# Patient Record
Sex: FEMALE | Race: WHITE | NOT HISPANIC OR LATINO | Employment: OTHER | URBAN - METROPOLITAN AREA
[De-identification: names, ages, dates, MRNs, and addresses within clinical notes are randomized per-mention and may not be internally consistent; named-entity substitution may affect disease eponyms.]

---

## 2017-02-22 RX ORDER — TRIAMTERENE AND HYDROCHLOROTHIAZIDE 37.5; 25 MG/1; MG/1
CAPSULE ORAL
Qty: 90 CAPSULE | Refills: 0 | Status: SHIPPED | OUTPATIENT
Start: 2017-02-22 | End: 2017-05-22 | Stop reason: SDUPTHER

## 2017-03-13 RX ORDER — METOPROLOL SUCCINATE 50 MG/1
TABLET, EXTENDED RELEASE ORAL
Qty: 90 TABLET | Refills: 0 | Status: SHIPPED | OUTPATIENT
Start: 2017-03-13 | End: 2017-06-11 | Stop reason: SDUPTHER

## 2017-03-27 RX ORDER — EZETIMIBE 10 MG/1
TABLET ORAL
Qty: 90 TABLET | OUTPATIENT
Start: 2017-03-27

## 2017-05-22 RX ORDER — TRIAMTERENE AND HYDROCHLOROTHIAZIDE 37.5; 25 MG/1; MG/1
CAPSULE ORAL
Qty: 90 CAPSULE | Refills: 0 | Status: SHIPPED | OUTPATIENT
Start: 2017-05-22 | End: 2017-07-01 | Stop reason: SDUPTHER

## 2017-06-12 RX ORDER — METOPROLOL SUCCINATE 50 MG/1
TABLET, EXTENDED RELEASE ORAL
Qty: 30 TABLET | Refills: 0 | Status: SHIPPED | OUTPATIENT
Start: 2017-06-12 | End: 2017-07-01 | Stop reason: SDUPTHER

## 2017-06-22 RX ORDER — METOPROLOL SUCCINATE 50 MG/1
TABLET, EXTENDED RELEASE ORAL
Qty: 7 TABLET | Refills: 0 | OUTPATIENT
Start: 2017-06-22

## 2017-06-22 RX ORDER — METOPROLOL SUCCINATE 25 MG/1
25 TABLET, EXTENDED RELEASE ORAL DAILY
Qty: 7 TABLET | Refills: 0 | Status: SHIPPED | OUTPATIENT
Start: 2017-06-22 | End: 2017-07-01 | Stop reason: SDUPTHER

## 2017-07-01 ENCOUNTER — OFFICE VISIT (OUTPATIENT)
Dept: FAMILY MEDICINE CLINIC | Facility: CLINIC | Age: 82
End: 2017-07-01

## 2017-07-01 VITALS
DIASTOLIC BLOOD PRESSURE: 62 MMHG | SYSTOLIC BLOOD PRESSURE: 96 MMHG | BODY MASS INDEX: 25.16 KG/M2 | HEIGHT: 63 IN | TEMPERATURE: 98.6 F | WEIGHT: 142 LBS

## 2017-07-01 DIAGNOSIS — R94.6 ABNORMAL RESULTS OF THYROID FUNCTION STUDIES: ICD-10-CM

## 2017-07-01 DIAGNOSIS — R07.9 CHEST PAIN, UNSPECIFIED TYPE: ICD-10-CM

## 2017-07-01 DIAGNOSIS — R79.89 OTHER SPECIFIED ABNORMAL FINDINGS OF BLOOD CHEMISTRY: ICD-10-CM

## 2017-07-01 DIAGNOSIS — N39.0 URINARY TRACT INFECTION WITHOUT HEMATURIA, SITE UNSPECIFIED: Primary | ICD-10-CM

## 2017-07-01 LAB
BILIRUB BLD-MCNC: NEGATIVE MG/DL
CLARITY, POC: CLEAR
COLOR UR: YELLOW
GLUCOSE UR STRIP-MCNC: NEGATIVE MG/DL
KETONES UR QL: NEGATIVE
LEUKOCYTE EST, POC: NORMAL
NITRITE UR-MCNC: NEGATIVE MG/ML
PH UR: 6 [PH] (ref 5–8)
PROT UR STRIP-MCNC: NEGATIVE MG/DL
RBC # UR STRIP: NEGATIVE /UL
SP GR UR: 1.02 (ref 1–1.03)
UROBILINOGEN UR QL: NORMAL

## 2017-07-01 PROCEDURE — 93000 ELECTROCARDIOGRAM COMPLETE: CPT | Performed by: FAMILY MEDICINE

## 2017-07-01 PROCEDURE — 81002 URINALYSIS NONAUTO W/O SCOPE: CPT | Performed by: FAMILY MEDICINE

## 2017-07-01 PROCEDURE — 99213 OFFICE O/P EST LOW 20 MIN: CPT | Performed by: FAMILY MEDICINE

## 2017-07-01 RX ORDER — METOPROLOL SUCCINATE 50 MG/1
50 TABLET, EXTENDED RELEASE ORAL DAILY
Qty: 90 TABLET | Refills: 1 | Status: SHIPPED | OUTPATIENT
Start: 2017-07-01 | End: 2018-02-08 | Stop reason: ALTCHOICE

## 2017-07-01 RX ORDER — TRIAMTERENE AND HYDROCHLOROTHIAZIDE 37.5; 25 MG/1; MG/1
1 CAPSULE ORAL EVERY MORNING
Qty: 90 CAPSULE | Refills: 1 | Status: SHIPPED | OUTPATIENT
Start: 2017-07-01 | End: 2017-10-28 | Stop reason: SINTOL

## 2017-07-01 RX ORDER — NITROGLYCERIN 0.4 MG/1
0.4 TABLET SUBLINGUAL
Qty: 25 TABLET | Refills: 2 | Status: SHIPPED | OUTPATIENT
Start: 2017-07-01 | End: 2018-03-30 | Stop reason: HOSPADM

## 2017-07-01 RX ORDER — EZETIMIBE 10 MG/1
10 TABLET ORAL DAILY
Qty: 90 TABLET | Refills: 1 | Status: SHIPPED | OUTPATIENT
Start: 2017-07-01 | End: 2018-02-08

## 2017-07-01 NOTE — PROGRESS NOTES
"  Chief Complaint   Patient presents with   • Hypertension     follow up   • Arthritis     follow up        Subjective.../HPI  Patient present today with abd muscle spasms x 3 last week. Lasts 3-4 min. Had been lifting heavy things before they occurred. Tender in center of abd also. Denies chest pain but had pain from epigastrum going up behind sternum.    I have reviewed the patient's medical history in detail and updated the computerized patient record.    Family History   Problem Relation Age of Onset   • Cancer Mother    • Colon cancer Mother    • Diabetes Sister    • Cancer Brother    • Diabetes Brother        Social History     Social History   • Marital status:      Spouse name: N/A   • Number of children: N/A   • Years of education: N/A     Occupational History   • Not on file.     Social History Main Topics   • Smoking status: Never Smoker   • Smokeless tobacco: Never Used   • Alcohol use No   • Drug use: No   • Sexual activity: Defer     Other Topics Concern   • Not on file     Social History Narrative       Review of Systems:   Review of Systems   Constitutional: Negative.    HENT: Negative.    Eyes: Negative.    Respiratory: Negative.    Cardiovascular: Negative.    Gastrointestinal: Negative.    Endocrine: Negative.    Genitourinary: Negative.    Musculoskeletal: Negative.    Skin: Negative.    Allergic/Immunologic: Negative.    Neurological: Negative.    Hematological: Negative.    Psychiatric/Behavioral: Negative.        Objective:  Vital Signs     Vitals:    07/01/17 1010   BP: 96/62   BP Location: Right arm   Patient Position: Sitting   Cuff Size: Adult   Temp: 98.6 °F (37 °C)   TempSrc: Oral   Weight: 142 lb (64.4 kg)   Height: 63\" (160 cm)     Physical Exam   Constitutional: She is oriented to person, place, and time. She appears well-developed and well-nourished.   HENT:   Head: Normocephalic.   Eyes: Pupils are equal, round, and reactive to light.   Neck: Normal range of motion. "   Cardiovascular: Normal rate, regular rhythm and normal heart sounds.    Pulmonary/Chest: Effort normal.   Abdominal: Soft.   Musculoskeletal: Normal range of motion.   Neurological: She is alert and oriented to person, place, and time.   Skin: Skin is warm and dry.        Results Review:      REVIEWED AND DISCUSSED CLINICAL RESULTS WITH PATIENT and REVIEWED AND DISCCUSSED POCT RESULTS WITH PATIENT                          Current Outpatient Prescriptions:   •  ezetimibe (ZETIA) 10 MG tablet, Take 1 tablet by mouth Daily., Disp: 90 tablet, Rfl: 1  •  metoprolol succinate XL (TOPROL-XL) 50 MG 24 hr tablet, Take 1 tablet by mouth Daily., Disp: 90 tablet, Rfl: 1  •  triamterene-hydrochlorothiazide (DYAZIDE) 37.5-25 MG per capsule, Take 1 capsule by mouth Every Morning., Disp: 90 capsule, Rfl: 1  •  nitroglycerin (NITROSTAT) 0.4 MG SL tablet, Place 1 tablet under the tongue Every 5 (Five) Minutes As Needed for Chest Pain. Take no more than 3 doses in 15 minutes., Disp: 25 tablet, Rfl: 2      ECG 12 Lead  Date/Time: 7/1/2017 10:54 AM  Performed by: DIANE RUBIO JR.  Authorized by: DIANE RUBIO JR.   Comparison: compared with previous ECG from 4/2/2016  Comparison to previous ECG: This ekg has improved  Rhythm: sinus rhythm  Rate: normal  T depression: III and V1  QRS axis: normal  Clinical impression: non-specific ECG            Assessment/Plan     Diagnoses and all orders for this visit:    Urinary tract infection without hematuria, site unspecified  -     POCT urinalysis dipstick, manual  -     Urine Culture  -     CBC & Differential  -     Comprehensive Metabolic Panel  -     Hemoglobin A1c  -     Lipid Panel With LDL / HDL Ratio  -     Thyroid Panel With TSH  -     D-dimer, Quantitative  -     CK; Future  -     CK MB; Future  -     Troponin T; Future    Chest pain, unspecified type  -     Urine Culture  -     CBC & Differential  -     Comprehensive Metabolic Panel  -     Hemoglobin A1c  -     Lipid  Panel With LDL / HDL Ratio  -     Thyroid Panel With TSH  -     D-dimer, Quantitative  -     CK; Future  -     CK MB; Future  -     Troponin T; Future  -     Stress Test With Myocardial Perfusion One Day    Other specified abnormal findings of blood chemistry   -     Hemoglobin A1c    Abnormal results of thyroid function studies   -     Thyroid Panel With TSH    Other orders  -     ECG 12 Lead  -     nitroglycerin (NITROSTAT) 0.4 MG SL tablet; Place 1 tablet under the tongue Every 5 (Five) Minutes As Needed for Chest Pain. Take no more than 3 doses in 15 minutes.  -     ezetimibe (ZETIA) 10 MG tablet; Take 1 tablet by mouth Daily.  -     metoprolol succinate XL (TOPROL-XL) 50 MG 24 hr tablet; Take 1 tablet by mouth Daily.  -     triamterene-hydrochlorothiazide (DYAZIDE) 37.5-25 MG per capsule; Take 1 capsule by mouth Every Morning.         Pt did not want to go to hospital and daughter is present  Daniel Cano Jr., MD  07/01/17  12:17 PM

## 2017-07-03 LAB
ALBUMIN SERPL-MCNC: 4.7 G/DL (ref 3.5–4.7)
ALBUMIN/GLOB SERPL: 1.7 {RATIO} (ref 1.2–2.2)
ALP SERPL-CCNC: 58 IU/L (ref 39–117)
ALT SERPL-CCNC: 13 IU/L (ref 0–32)
AST SERPL-CCNC: 22 IU/L (ref 0–40)
BACTERIA UR CULT: NORMAL
BACTERIA UR CULT: NORMAL
BASOPHILS # BLD AUTO: 0 X10E3/UL (ref 0–0.2)
BASOPHILS NFR BLD AUTO: 0 %
BILIRUB SERPL-MCNC: 0.6 MG/DL (ref 0–1.2)
BUN SERPL-MCNC: 44 MG/DL (ref 8–27)
BUN/CREAT SERPL: 31 (ref 12–28)
CALCIUM SERPL-MCNC: 9.7 MG/DL (ref 8.7–10.3)
CHLORIDE SERPL-SCNC: 99 MMOL/L (ref 96–106)
CHOLEST SERPL-MCNC: 188 MG/DL (ref 100–199)
CO2 SERPL-SCNC: 25 MMOL/L (ref 18–29)
CREAT SERPL-MCNC: 1.42 MG/DL (ref 0.57–1)
D DIMER PPP FEU-MCNC: 2.19 MG/L FEU (ref 0–0.49)
EOSINOPHIL # BLD AUTO: 0.2 X10E3/UL (ref 0–0.4)
EOSINOPHIL NFR BLD AUTO: 3 %
ERYTHROCYTE [DISTWIDTH] IN BLOOD BY AUTOMATED COUNT: 14.6 % (ref 12.3–15.4)
FT4I SERPL CALC-MCNC: 2.8 (ref 1.2–4.9)
GLOBULIN SER CALC-MCNC: 2.7 G/DL (ref 1.5–4.5)
GLUCOSE SERPL-MCNC: 106 MG/DL (ref 65–99)
HBA1C MFR BLD: 6.5 % (ref 4.8–5.6)
HCT VFR BLD AUTO: 41 % (ref 34–46.6)
HDLC SERPL-MCNC: 48 MG/DL
HGB BLD-MCNC: 13.2 G/DL (ref 11.1–15.9)
IMM GRANULOCYTES # BLD: 0 X10E3/UL (ref 0–0.1)
IMM GRANULOCYTES NFR BLD: 0 %
LDLC SERPL CALC-MCNC: 104 MG/DL (ref 0–99)
LDLC/HDLC SERPL: 2.2 RATIO UNITS (ref 0–3.2)
LYMPHOCYTES # BLD AUTO: 2.7 X10E3/UL (ref 0.7–3.1)
LYMPHOCYTES NFR BLD AUTO: 33 %
MCH RBC QN AUTO: 29.5 PG (ref 26.6–33)
MCHC RBC AUTO-ENTMCNC: 32.2 G/DL (ref 31.5–35.7)
MCV RBC AUTO: 92 FL (ref 79–97)
MONOCYTES # BLD AUTO: 0.6 X10E3/UL (ref 0.1–0.9)
MONOCYTES NFR BLD AUTO: 7 %
NEUTROPHILS # BLD AUTO: 4.6 X10E3/UL (ref 1.4–7)
NEUTROPHILS NFR BLD AUTO: 57 %
PLATELET # BLD AUTO: 170 X10E3/UL (ref 150–379)
POTASSIUM SERPL-SCNC: 4.6 MMOL/L (ref 3.5–5.2)
PROT SERPL-MCNC: 7.4 G/DL (ref 6–8.5)
RBC # BLD AUTO: 4.48 X10E6/UL (ref 3.77–5.28)
SODIUM SERPL-SCNC: 143 MMOL/L (ref 134–144)
T3RU NFR SERPL: 32 % (ref 24–39)
T4 SERPL-MCNC: 8.7 UG/DL (ref 4.5–12)
TRIGL SERPL-MCNC: 182 MG/DL (ref 0–149)
TSH SERPL DL<=0.005 MIU/L-ACNC: 2.37 UIU/ML (ref 0.45–4.5)
VLDLC SERPL CALC-MCNC: 36 MG/DL (ref 5–40)
WBC # BLD AUTO: 8.2 X10E3/UL (ref 3.4–10.8)

## 2017-07-05 ENCOUNTER — HOSPITAL ENCOUNTER (OUTPATIENT)
Dept: CARDIOLOGY | Facility: HOSPITAL | Age: 82
Discharge: HOME | End: 2017-07-05
Attending: FAMILY MEDICINE

## 2017-07-05 VITALS — SYSTOLIC BLOOD PRESSURE: 118 MMHG | HEART RATE: 71 BPM | DIASTOLIC BLOOD PRESSURE: 66 MMHG

## 2017-07-05 LAB
BH CV STRESS BP STAGE 1: NORMAL
BH CV STRESS COMMENTS STAGE 1: NORMAL
BH CV STRESS DOSE REGADENOSON STAGE 1: 0.4
BH CV STRESS DURATION MIN STAGE 1: 0
BH CV STRESS DURATION SEC STAGE 1: 15
BH CV STRESS HR STAGE 1: 91
BH CV STRESS PROTOCOL 1: NORMAL
BH CV STRESS RECOVERY BP: NORMAL MMHG
BH CV STRESS RECOVERY HR: 93 BPM
BH CV STRESS STAGE 1: 1
LV EF NUC BP: 84 %
MAXIMAL PREDICTED HEART RATE: 138 BPM
PERCENT MAX PREDICTED HR: 72.46 %
STRESS BASELINE BP: NORMAL MMHG
STRESS BASELINE HR: 69 BPM
STRESS PERCENT HR: 85 %
STRESS POST ESTIMATED WORKLOAD: 1.7 METS
STRESS POST EXERCISE DUR MIN: 1 MIN
STRESS POST EXERCISE DUR SEC: 0 SEC
STRESS POST PEAK BP: NORMAL MMHG
STRESS POST PEAK HR: 100 BPM
STRESS TARGET HR: 117 BPM

## 2017-07-05 PROCEDURE — 25010000002 REGADENOSON 0.4 MG/5ML SOLUTION: Performed by: FAMILY MEDICINE

## 2017-07-05 PROCEDURE — 78452 HT MUSCLE IMAGE SPECT MULT: CPT | Performed by: INTERNAL MEDICINE

## 2017-07-05 PROCEDURE — 93017 CV STRESS TEST TRACING ONLY: CPT

## 2017-07-05 PROCEDURE — 93018 CV STRESS TEST I&R ONLY: CPT | Performed by: INTERNAL MEDICINE

## 2017-07-05 PROCEDURE — 0 TECHNETIUM SESTAMIBI: Performed by: FAMILY MEDICINE

## 2017-07-05 PROCEDURE — 93016 CV STRESS TEST SUPVJ ONLY: CPT | Performed by: INTERNAL MEDICINE

## 2017-07-05 PROCEDURE — 78452 HT MUSCLE IMAGE SPECT MULT: CPT

## 2017-07-05 PROCEDURE — A9500 TC99M SESTAMIBI: HCPCS | Performed by: FAMILY MEDICINE

## 2017-07-05 RX ADMIN — REGADENOSON 0.4 MG: 0.08 INJECTION, SOLUTION INTRAVENOUS at 11:26

## 2017-07-05 RX ADMIN — Medication 1 DOSE: at 11:26

## 2017-07-05 RX ADMIN — Medication 1 DOSE: at 08:36

## 2017-07-06 ENCOUNTER — RESULTS ENCOUNTER (OUTPATIENT)
Dept: FAMILY MEDICINE CLINIC | Facility: CLINIC | Age: 82
End: 2017-07-06

## 2017-07-06 DIAGNOSIS — N39.0 URINARY TRACT INFECTION WITHOUT HEMATURIA, SITE UNSPECIFIED: ICD-10-CM

## 2017-07-06 DIAGNOSIS — R07.9 CHEST PAIN, UNSPECIFIED TYPE: ICD-10-CM

## 2017-08-07 ENCOUNTER — TELEPHONE (OUTPATIENT)
Dept: FAMILY MEDICINE CLINIC | Facility: CLINIC | Age: 82
End: 2017-08-07

## 2017-08-07 NOTE — TELEPHONE ENCOUNTER
----- Message from Olivia Holm sent at 8/7/2017  3:43 PM EDT -----  Regarding: Results  Contact: [phone]  On 7/3/17 patient had blood work done and would like a call about the results.    Informed patient to give 48 hrs for call back.    Thank you    Last office visit 7/1/17

## 2017-08-08 NOTE — TELEPHONE ENCOUNTER
Told patient results of all her labs.  She could not hear so discussed this with her son.  She will make an appointment to come in so we can discuss her renal function tests.

## 2017-10-28 ENCOUNTER — OFFICE VISIT (OUTPATIENT)
Dept: FAMILY MEDICINE CLINIC | Facility: CLINIC | Age: 82
End: 2017-10-28

## 2017-10-28 VITALS
SYSTOLIC BLOOD PRESSURE: 100 MMHG | HEART RATE: 64 BPM | OXYGEN SATURATION: 93 % | TEMPERATURE: 98.2 F | WEIGHT: 143 LBS | HEIGHT: 63 IN | DIASTOLIC BLOOD PRESSURE: 56 MMHG | BODY MASS INDEX: 25.34 KG/M2

## 2017-10-28 DIAGNOSIS — N18.30 CKD (CHRONIC KIDNEY DISEASE) STAGE 3, GFR 30-59 ML/MIN (HCC): Primary | ICD-10-CM

## 2017-10-28 DIAGNOSIS — N30.00 ACUTE CYSTITIS WITHOUT HEMATURIA: ICD-10-CM

## 2017-10-28 DIAGNOSIS — I10 ESSENTIAL HYPERTENSION: ICD-10-CM

## 2017-10-28 LAB
BILIRUB BLD-MCNC: NEGATIVE MG/DL
CLARITY, POC: CLEAR
COLOR UR: YELLOW
GLUCOSE UR STRIP-MCNC: NEGATIVE MG/DL
KETONES UR QL: NEGATIVE
LEUKOCYTE EST, POC: NEGATIVE
NITRITE UR-MCNC: NEGATIVE MG/ML
PH UR: 6 [PH] (ref 5–8)
PROT UR STRIP-MCNC: NEGATIVE MG/DL
RBC # UR STRIP: NEGATIVE /UL
SP GR UR: 1.02 (ref 1–1.03)
UROBILINOGEN UR QL: NORMAL

## 2017-10-28 PROCEDURE — 99213 OFFICE O/P EST LOW 20 MIN: CPT | Performed by: FAMILY MEDICINE

## 2017-10-28 PROCEDURE — 81003 URINALYSIS AUTO W/O SCOPE: CPT | Performed by: FAMILY MEDICINE

## 2017-10-28 NOTE — PROGRESS NOTES
Chief Complaint   Patient presents with   • Urinary Tract Infection     follow up from last visit do not have a problem today with a uti but following up from last visit        Subjective.../HPI  Patient present today with f/u nocturia but no h/d. Slight dizzy in am after taking her meds. Pt only drinks one glass of water daily    I have reviewed the patient's medical history in detail and updated the computerized patient record.    Family History   Problem Relation Age of Onset   • Cancer Mother    • Colon cancer Mother    • Diabetes Sister    • Cancer Brother    • Diabetes Brother        Social History     Social History   • Marital status:      Spouse name: N/A   • Number of children: N/A   • Years of education: N/A     Occupational History   • Not on file.     Social History Main Topics   • Smoking status: Never Smoker   • Smokeless tobacco: Never Used   • Alcohol use No   • Drug use: No   • Sexual activity: Defer     Other Topics Concern   • Not on file     Social History Narrative       Review of Systems:   Review of Systems   Constitutional: Negative for chills, fatigue, fever and unexpected weight change.   HENT: Negative for ear pain, hearing loss, sinus pressure, sore throat and tinnitus.    Eyes: Negative for pain, discharge and redness.   Respiratory: Negative for cough, shortness of breath and wheezing.    Cardiovascular: Negative for chest pain, palpitations and leg swelling.   Gastrointestinal: Negative for abdominal pain, constipation, diarrhea and nausea.   Endocrine: Negative for cold intolerance and heat intolerance.   Genitourinary: Negative for difficulty urinating, flank pain and urgency.   Musculoskeletal: Negative for back pain, joint swelling and myalgias.   Skin: Negative for rash and wound.   Allergic/Immunologic: Negative for environmental allergies and food allergies.   Neurological: Negative for dizziness, seizures, numbness and headaches.   Hematological: Negative for  "adenopathy. Does not bruise/bleed easily.   Psychiatric/Behavioral: Negative for decreased concentration, dysphoric mood and sleep disturbance. The patient is not nervous/anxious.    All other systems reviewed and are negative.      Objective:  Vital Signs     Vitals:    10/28/17 1109   BP: 100/56   BP Location: Right arm   Patient Position: Sitting   Cuff Size: Adult   Pulse: 64   Temp: 98.2 °F (36.8 °C)   TempSrc: Oral   SpO2: 93%   Weight: 143 lb (64.9 kg)   Height: 63\" (160 cm)     Physical Exam   Constitutional: She is oriented to person, place, and time. She appears well-developed and well-nourished.   HENT:   Head: Normocephalic.   Eyes: Pupils are equal, round, and reactive to light.   Neck: Normal range of motion.   Cardiovascular: Normal rate, regular rhythm and normal heart sounds.    Pulmonary/Chest: Effort normal.   Abdominal: Soft. There is tenderness (slight suprapubic  and cva tenderness).   Musculoskeletal: Normal range of motion.   Neurological: She is alert and oriented to person, place, and time.   Skin: Skin is warm and dry.        Results Review:      REVIEWED AND DISCUSSED LAB RESULTS WITH PATIENT, REVIEWED AND DISCUSSED CLINICAL RESULTS WITH PATIENT and REVIEWED AND DISCCUSSED POCT RESULTS WITH PATIENT                          Current Outpatient Prescriptions:   •  ezetimibe (ZETIA) 10 MG tablet, Take 1 tablet by mouth Daily., Disp: 90 tablet, Rfl: 1  •  metoprolol succinate XL (TOPROL-XL) 50 MG 24 hr tablet, Take 1 tablet by mouth Daily., Disp: 90 tablet, Rfl: 1  •  nitroglycerin (NITROSTAT) 0.4 MG SL tablet, Place 1 tablet under the tongue Every 5 (Five) Minutes As Needed for Chest Pain. Take no more than 3 doses in 15 minutes., Disp: 25 tablet, Rfl: 2  •  Mirabegron ER (MYRBETRIQ) 25 MG tablet sustained-release 24 hour 24 hr tablet, Take 1 tablet by mouth Daily., Disp: 30 tablet, Rfl: 5    Procedures    Assessment/Plan     Diagnoses and all orders for this visit:    CKD (chronic kidney " disease) stage 3, GFR 30-59 ml/min  -     Urine Culture - Urine, Urine, Clean Catch  -     Basic Metabolic Panel    Essential hypertension  -     POC Urinalysis Dipstick, Automated  -     Urine Culture - Urine, Urine, Clean Catch    Acute cystitis without hematuria  -     Urine Culture - Urine, Urine, Clean Catch    Other orders  -     Mirabegron ER (MYRBETRIQ) 25 MG tablet sustained-release 24 hour 24 hr tablet; Take 1 tablet by mouth Daily.      Discontinue maxzide dur to symptoms and elevated renal fxn tests     Daniel Cano Jr., MD  10/28/17  12:30 PM

## 2017-11-02 LAB
BACTERIA UR CULT: ABNORMAL
BACTERIA UR CULT: ABNORMAL

## 2017-11-29 LAB
BUN SERPL-MCNC: 31 MG/DL (ref 8–27)
BUN/CREAT SERPL: 27 (ref 12–28)
CALCIUM SERPL-MCNC: 9.3 MG/DL (ref 8.7–10.3)
CHLORIDE SERPL-SCNC: 105 MMOL/L (ref 96–106)
CK MB SERPL-MCNC: 5.2 NG/ML (ref 0–5.3)
CK SERPL-CCNC: 251 U/L (ref 24–173)
CO2 SERPL-SCNC: 15 MMOL/L (ref 18–29)
CREAT SERPL-MCNC: 1.14 MG/DL (ref 0.57–1)
GFR SERPLBLD CREATININE-BSD FMLA CKD-EPI: 45 ML/MIN/1.73
GFR SERPLBLD CREATININE-BSD FMLA CKD-EPI: 52 ML/MIN/1.73
GLUCOSE SERPL-MCNC: 89 MG/DL (ref 65–99)
POTASSIUM SERPL-SCNC: 4.1 MMOL/L (ref 3.5–5.2)
SODIUM SERPL-SCNC: 143 MMOL/L (ref 134–144)
TROPONIN T SERPL-MCNC: <0.011 NG/ML

## 2017-12-04 ENCOUNTER — TELEPHONE (OUTPATIENT)
Dept: FAMILY MEDICINE CLINIC | Facility: CLINIC | Age: 82
End: 2017-12-04

## 2017-12-04 NOTE — TELEPHONE ENCOUNTER
Atten Dr. Anglin    Contact Tish    [phone]      Pt is asking for lab results    Copy on Your Desk

## 2017-12-05 NOTE — TELEPHONE ENCOUNTER
Talk with the daughter and told her the results of her mother's lab tests.  The daughter states that her mother began to feel bad had a severe headache she checked her blood pressure is 190 range.  She restarted the triamterene/hydrochlorothiazide.  The blood pressure is come back to normal and the mother is feeling better.  She did have an elevated CK.  She will stop tizanidine and see if this makes a difference in the way her mother feels.  She will also have a BMP and CK drawn when she goes in next to see her new doctor.

## 2018-01-02 RX ORDER — METOPROLOL SUCCINATE 50 MG/1
TABLET, EXTENDED RELEASE ORAL
Qty: 90 TABLET | Refills: 1 | OUTPATIENT
Start: 2018-01-02

## 2018-02-08 ENCOUNTER — OFFICE VISIT (OUTPATIENT)
Dept: FAMILY MEDICINE CLINIC | Facility: CLINIC | Age: 83
End: 2018-02-08

## 2018-02-08 VITALS
HEART RATE: 67 BPM | OXYGEN SATURATION: 96 % | TEMPERATURE: 97.2 F | DIASTOLIC BLOOD PRESSURE: 57 MMHG | WEIGHT: 143.9 LBS | BODY MASS INDEX: 26.48 KG/M2 | HEIGHT: 62 IN | SYSTOLIC BLOOD PRESSURE: 93 MMHG

## 2018-02-08 DIAGNOSIS — I10 ESSENTIAL (PRIMARY) HYPERTENSION: ICD-10-CM

## 2018-02-08 DIAGNOSIS — R35.1 NOCTURIA: ICD-10-CM

## 2018-02-08 DIAGNOSIS — R27.0 ATAXIA: ICD-10-CM

## 2018-02-08 DIAGNOSIS — Z23 NEED FOR PROPHYLACTIC VACCINATION WITH TETANUS-DIPHTHERIA (TD): ICD-10-CM

## 2018-02-08 DIAGNOSIS — H91.93 BILATERAL HEARING LOSS, UNSPECIFIED HEARING LOSS TYPE: ICD-10-CM

## 2018-02-08 DIAGNOSIS — Z23 NEED FOR PNEUMOCOCCAL VACCINE: ICD-10-CM

## 2018-02-08 DIAGNOSIS — I10 ESSENTIAL HYPERTENSION: Primary | ICD-10-CM

## 2018-02-08 DIAGNOSIS — R26.81 GAIT INSTABILITY: ICD-10-CM

## 2018-02-08 LAB
ALBUMIN SERPL-MCNC: 4.5 G/DL (ref 3.5–5.2)
ALBUMIN/GLOB SERPL: 1.6 G/DL
ALP SERPL-CCNC: 57 U/L (ref 39–117)
ALT SERPL-CCNC: 16 U/L (ref 1–33)
AST SERPL-CCNC: 22 U/L (ref 1–32)
BASOPHILS # BLD AUTO: 0.02 10*3/MM3 (ref 0–0.2)
BASOPHILS NFR BLD AUTO: 0.3 % (ref 0–1.5)
BILIRUB SERPL-MCNC: 0.5 MG/DL (ref 0.1–1.2)
BUN SERPL-MCNC: 44 MG/DL (ref 8–23)
BUN/CREAT SERPL: 30.6 (ref 7–25)
CALCIUM SERPL-MCNC: 10.1 MG/DL (ref 8.6–10.5)
CHLORIDE SERPL-SCNC: 102 MMOL/L (ref 98–107)
CO2 SERPL-SCNC: 27.6 MMOL/L (ref 22–29)
CREAT SERPL-MCNC: 1.44 MG/DL (ref 0.57–1)
EOSINOPHIL # BLD AUTO: 0.24 10*3/MM3 (ref 0–0.7)
EOSINOPHIL NFR BLD AUTO: 3.1 % (ref 0.3–6.2)
ERYTHROCYTE [DISTWIDTH] IN BLOOD BY AUTOMATED COUNT: 13.7 % (ref 11.7–13)
GFR SERPLBLD CREATININE-BSD FMLA CKD-EPI: 35 ML/MIN/1.73
GFR SERPLBLD CREATININE-BSD FMLA CKD-EPI: 42 ML/MIN/1.73
GLOBULIN SER CALC-MCNC: 2.9 GM/DL
GLUCOSE SERPL-MCNC: 98 MG/DL (ref 65–99)
HCT VFR BLD AUTO: 40.9 % (ref 35.6–45.5)
HGB BLD-MCNC: 13 G/DL (ref 11.9–15.5)
IMM GRANULOCYTES # BLD: 0.03 10*3/MM3 (ref 0–0.03)
IMM GRANULOCYTES NFR BLD: 0.4 % (ref 0–0.5)
LYMPHOCYTES # BLD AUTO: 2.16 10*3/MM3 (ref 0.9–4.8)
LYMPHOCYTES NFR BLD AUTO: 27.8 % (ref 19.6–45.3)
MCH RBC QN AUTO: 30 PG (ref 26.9–32)
MCHC RBC AUTO-ENTMCNC: 31.8 G/DL (ref 32.4–36.3)
MCV RBC AUTO: 94.5 FL (ref 80.5–98.2)
MONOCYTES # BLD AUTO: 0.75 10*3/MM3 (ref 0.2–1.2)
MONOCYTES NFR BLD AUTO: 9.7 % (ref 5–12)
NEUTROPHILS # BLD AUTO: 4.57 10*3/MM3 (ref 1.9–8.1)
NEUTROPHILS NFR BLD AUTO: 58.7 % (ref 42.7–76)
PLATELET # BLD AUTO: 206 10*3/MM3 (ref 140–500)
POTASSIUM SERPL-SCNC: 4.7 MMOL/L (ref 3.5–5.2)
PROT SERPL-MCNC: 7.4 G/DL (ref 6–8.5)
RBC # BLD AUTO: 4.33 10*6/MM3 (ref 3.9–5.2)
SODIUM SERPL-SCNC: 142 MMOL/L (ref 136–145)
TSH SERPL DL<=0.005 MIU/L-ACNC: 2.59 MIU/ML (ref 0.27–4.2)
UNABLE TO VOID: NORMAL
VIT B12 SERPL-MCNC: 795 PG/ML (ref 211–946)
WBC # BLD AUTO: 7.77 10*3/MM3 (ref 4.5–10.7)

## 2018-02-08 PROCEDURE — 90471 IMMUNIZATION ADMIN: CPT | Performed by: FAMILY MEDICINE

## 2018-02-08 PROCEDURE — 99214 OFFICE O/P EST MOD 30 MIN: CPT | Performed by: FAMILY MEDICINE

## 2018-02-08 PROCEDURE — 90670 PCV13 VACCINE IM: CPT | Performed by: FAMILY MEDICINE

## 2018-02-08 PROCEDURE — G0009 ADMIN PNEUMOCOCCAL VACCINE: HCPCS | Performed by: FAMILY MEDICINE

## 2018-02-08 PROCEDURE — 90714 TD VACC NO PRESV 7 YRS+ IM: CPT | Performed by: FAMILY MEDICINE

## 2018-02-08 RX ORDER — TETANUS AND DIPHTHERIA TOXOIDS ADSORBED 2; 2 [LF]/.5ML; [LF]/.5ML
0.5 INJECTION INTRAMUSCULAR ONCE
Qty: 0.5 ML | Refills: 0 | Status: SHIPPED | OUTPATIENT
Start: 2018-02-08 | End: 2018-02-08

## 2018-02-08 RX ORDER — TRIAMTERENE AND HYDROCHLOROTHIAZIDE 37.5; 25 MG/1; MG/1
1 CAPSULE ORAL EVERY MORNING
COMMUNITY
End: 2018-02-08 | Stop reason: ALTCHOICE

## 2018-02-08 NOTE — PROGRESS NOTES
Subjective   Allison Fitzpatrick is a 83 y.o. female.     HPI Comments:  comes in for follow-up of hypertension and other medical problems.  She's new patient to me.  Blood pressure seems labile and is unclear to what extent, if any, that is associated with her gait problem.  She is taking metoprolol and Dyazide.  Had a few episodes of paroxysmal bumps and blood pressures been associated with severe pain for example.  It is not at all sure that they are getting accurate blood pressure readings at home however.  And has some discomfort that has been described after cardiology workup is what sounds like esophageal spasms, for which she is given nitroglycerin that she uses very infrequently.  She has no chest pain or dyspnea.  Her problem with gait instability.  She has fallen and has a lot of difficulty getting up.  Despite this she continues to climb ladders etc. when she needs to do something.  She lives with a son.  No peripheral edema.    She has problems with frequent diarrhea for which she uses Imodium when necessary.  Yourself on vitamin B12.  She denies any rectal bleeding.  She had colonoscopy and EGD a couple years ago.       The following portions of the patient's history were reviewed and updated as appropriate: allergies, current medications, past family history, past medical history, past social history, past surgical history and problem list.    Review of Systems   Constitutional: Positive for fatigue. Negative for chills, fever and unexpected weight change.   HENT: Positive for hearing loss (chronic not fixed with earing aids). Negative for congestion, rhinorrhea and sore throat.    Eyes: Negative for discharge and visual disturbance.   Respiratory: Negative for cough and shortness of breath.    Cardiovascular: Negative for chest pain.   Gastrointestinal: Positive for diarrhea (frequent). Negative for abdominal pain, constipation, nausea and vomiting.   Endocrine: Negative for polydipsia.    Genitourinary: Negative for dysuria and hematuria. Urgency: nocturia.   Musculoskeletal: Positive for gait problem. Negative for arthralgias and myalgias.   Skin: Negative for rash.   Allergic/Immunologic: Negative.    Neurological: Negative for weakness, numbness and headaches.   Hematological: Does not bruise/bleed easily.   Psychiatric/Behavioral: Negative for dysphoric mood. The patient is not nervous/anxious.    All other systems reviewed and are negative.      Objective   Physical Exam   Constitutional: She is oriented to person, place, and time. She appears well-developed and well-nourished. She does not have a sickly appearance.   HENT:   Head: Normocephalic and atraumatic.   Right Ear: Tympanic membrane and external ear normal.   Left Ear: Tympanic membrane and external ear normal.   Mouth/Throat: No oropharyngeal exudate.   Very hard of hearing     Eyes: Conjunctivae, EOM and lids are normal. Pupils are equal, round, and reactive to light. Right eye exhibits no discharge. Left eye exhibits no discharge. No scleral icterus.   Neck: Trachea normal, normal range of motion and phonation normal. Neck supple. No thyromegaly present.   Cardiovascular: Normal rate, regular rhythm and normal heart sounds.  Exam reveals no gallop and no friction rub.    No murmur heard.  Pulmonary/Chest: Effort normal and breath sounds normal. No stridor. She has no wheezes. She has no rales.   Abdominal: Soft. She exhibits no distension. There is no tenderness.   Musculoskeletal: Normal range of motion. She exhibits no edema.   Not edematous   Lymphadenopathy:     She has no cervical adenopathy.   Neurological: She is alert and oriented to person, place, and time. She has normal strength. No cranial nerve deficit. Coordination and gait abnormal.   She's very unsteady when she attempts to get out of a chair and walk across the room.  She has a great deal difficulty when she attempts to turn around.   Skin: Skin is warm, dry and  intact. No petechiae and no rash noted. No cyanosis. Nails show no clubbing.   Psychiatric: She has a normal mood and affect. Her speech is normal and behavior is normal. Judgment and thought content normal. Cognition and memory are normal.   Nursing note and vitals reviewed.    Old records are reviewed and immunization records requested/updated from [facility]--she has no record of any pneumococcal vaccine.  Dr. Cano have asked that the diuretic be stopped at that was apparently misunderstood.  Patient had a cardiac workup a few years ago with a negative nuclear stress test.  Echo showed some LVH.  Carotid the patient's history she may have had some palpitations many years ago.  In short, unable to find a clear-cut indication for need for a beta blocker.     Pressure runs so low and she is a tremendous fall risk and wants try to modify her therapy.  We will check her labs today and we'll discontinue the diuretic.  I will begin tapering her off of the beta blocker and see her back shortly.  If she needs medication for blood pressure will decide at that point based on labs and clinical situation whether to go with an ACE inhibitor or a calcium channel blocker.    I would very much like to send her to physical therapy for gait training and assistance but transportation is too much of an issue right now.  She is agreeable to a quad cane.  I talked very clearly to her about risk of falling and the sequelae thereof.    They will go back and check with the otolaryngologist to see if they have anything to offer with respect to hearing.  We will contact him with lab reports.  Recheck in one month after she has tapered off of the beta blocker.    Prevnar and tetanus diphtheria today.  One-month follow-up    Assessment/Plan   Allison was seen today for hypertension and med refill.    Diagnoses and all orders for this visit:    Essential hypertension  -     Comprehensive Metabolic Panel  -     CBC & Differential    Gait  instability  -     Vitamin B12  -     TSH Rfx On Abnormal To Free T4  -     Comprehensive Metabolic Panel    Ataxia    Bilateral hearing loss, unspecified hearing loss type    Nocturia  -     Urinalysis With / Microscopic If Indicated - Urine, Clean Catch  -     CBC & Differential    Need for pneumococcal vaccine  -     Pneumococcal Conjugate Vaccine 13-Valent All (PCV13)    Need for prophylactic vaccination with tetanus-diphtheria (TD)  -     tetanus-diphtheria toxoids (DECAVAC 2-2) 2-2 LF/0.5ML injection; Inject 0.5 mL into the shoulder, thigh, or buttocks 1 (One) Time for 1 dose.    Essential (primary) hypertension   -     TSH Rfx On Abnormal To Free T4        Patient Instructions   Schedule a follow-up with Dr. Cormier to see if there are any options to help with hearing    You are getting the Prevnar (pneumonia vaccine, one of two types), and tetanus shot today.    STOP triamterene/hydrochlorothiazide (fluid and blood pressure pill)    SLOWLY STOP/TAPER OFF OF metoprolol--take 1/2 pill daily for two weeks and then 1/2 pill every other day until out. WE WILL NOT REFILL THIS.      If needed for pain, use two of the 325 mg regular strength tablets of acetaminophen/Tylenol--650 mg every 4 hours, maximum 4000 mg/24 hours but no other OTC pain medications.     We will call you about lab results    PLEASE GET A QUAD CANE AND USE IT AT ALL TIMES.  THIS IS ESPECIALLY IMPORTANT WHEN YOU GET UP FROM BEING SEATED.     NO CLIMBING ON CHAIRS OR LADDERS OR ANYTHING ELSE.  WAIT FOR SOMEONE TO DO IT FOR YOU.    BRING YOUR BLOOD PRESSURE MACHINE WITH YOU TO NEXT VISIT SO WE CAN COMPARE  READINGS     I would strongly encourage you to sign up for My Chart using the access code provided with these papers.  This provides an excellent convenient way for you to communicate with us and with any of your [facility] physicians.  Lab and x-ray reports can be viewed, prescription refills and appointments may be requested, and you may  send emails to your physician's office.      PLEASE BRING ALL OF YOUR MEDICATIONS IN THEIR ORIGINAL BOTTLES TO YOUR NEXT VISIT    IT IS VERY IMPORTANT THAT YOU KEEP A PRINTED LIST OF ALL OF YOUR MEDICATIONS AND DOSES AND OF ALL MEDICATION ALLERGIES WITH YOU/ON YOUR PERSON AT ALL TIMES.  THIS IS OF CRITICAL IMPORTANCE IN THE EVENT THAT YOU ARE INJURED OR ILL AND ARE UNABLE TO CONVEY THIS INFORMATION TO THOSE CARING FOR YOU IN AN EMERGENCY SITUATION.      If problems in next two weeks while I am gone, check with nurse practitioner Nafisa Wayne in my office

## 2018-02-08 NOTE — PATIENT INSTRUCTIONS
Schedule a follow-up with Dr. Cormier to see if there are any options to help with hearing    You are getting the Prevnar (pneumonia vaccine, one of two types), and tetanus shot today.    STOP triamterene/hydrochlorothiazide (fluid and blood pressure pill)    SLOWLY STOP/TAPER OFF OF metoprolol--take 1/2 pill daily for two weeks and then 1/2 pill every other day until out. WE WILL NOT REFILL THIS.      If needed for pain, use two of the 325 mg regular strength tablets of acetaminophen/Tylenol--650 mg every 4 hours, maximum 4000 mg/24 hours but no other OTC pain medications.     We will call you about lab results    PLEASE GET A QUAD CANE AND USE IT AT ALL TIMES.  THIS IS ESPECIALLY IMPORTANT WHEN YOU GET UP FROM BEING SEATED.     NO CLIMBING ON CHAIRS OR LADDERS OR ANYTHING ELSE.  WAIT FOR SOMEONE TO DO IT FOR YOU.    BRING YOUR BLOOD PRESSURE MACHINE WITH YOU TO NEXT VISIT SO WE CAN COMPARE  READINGS     I would strongly encourage you to sign up for My Chart using the access code provided with these papers.  This provides an excellent convenient way for you to communicate with us and with any of your [facility] physicians.  Lab and x-ray reports can be viewed, prescription refills and appointments may be requested, and you may send emails to your physician's office.      PLEASE BRING ALL OF YOUR MEDICATIONS IN THEIR ORIGINAL BOTTLES TO YOUR NEXT VISIT    IT IS VERY IMPORTANT THAT YOU KEEP A PRINTED LIST OF ALL OF YOUR MEDICATIONS AND DOSES AND OF ALL MEDICATION ALLERGIES WITH YOU/ON YOUR PERSON AT ALL TIMES.  THIS IS OF CRITICAL IMPORTANCE IN THE EVENT THAT YOU ARE INJURED OR ILL AND ARE UNABLE TO CONVEY THIS INFORMATION TO THOSE CARING FOR YOU IN AN EMERGENCY SITUATION.      If problems in next two weeks while I am gone, check with nurse practitioner Nafisa Wayne in my office

## 2018-02-14 ENCOUNTER — TELEPHONE (OUTPATIENT)
Dept: FAMILY MEDICINE CLINIC | Facility: CLINIC | Age: 83
End: 2018-02-14

## 2018-02-26 ENCOUNTER — TELEPHONE (OUTPATIENT)
Dept: FAMILY MEDICINE CLINIC | Facility: CLINIC | Age: 83
End: 2018-02-26

## 2018-02-26 NOTE — TELEPHONE ENCOUNTER
Daughter manuel called from work 930-2017    They have been tapering down her bp meds like Dr Owens said. Her last two have been going up and she has a H/A on top of head that tylenol wont go away.    185/122 and 189/106    What should they do?

## 2018-02-27 ENCOUNTER — TELEPHONE (OUTPATIENT)
Dept: FAMILY MEDICINE CLINIC | Facility: CLINIC | Age: 83
End: 2018-02-27

## 2018-02-27 NOTE — TELEPHONE ENCOUNTER
Pt daughter called and said that pt takes metoprolol 50mg 1/2 tab every other day cause another doctor was taking her off of it. Since blood pressure has been running 189/111. Per familia vasquez told daughter to start taking a 1/2 pill daily and call us in a couple of days and let us know what her bp have been running.

## 2018-03-14 NOTE — PROGRESS NOTES
Subjective   Allison Fitzpatrick is a 83 y.o. female.     Follow-up of blood pressure.  He was having difficulty with blood pressure and significant difficulty with gait instability.  She had previously been asked to discontinue diuretic particularly after review of her renal function that instruction was reinforced after last visit.  Attempt was made at tapering beta blocker with resulting significant hypertension with systolics near 190.  Tapering was discontinued but today he tells me that she has not had a pill for 2 days.  She gets occasional mostly nighttime superior occipital achy headaches.  Occasional nonexertional chest discomfort which has been treated for over a year as esophageal discomfort.  Is not getting any worse and    Went to see Dr. Cormier her ENT and they found another cyst in her ear and have advised that only hope for improvement in hearing is a cochlear implant.  She is scheduled for some imaging studies.  They will get me notes of that visit.    Really not using her cane.  Still doesn't want to go to physical therapy.             The following portions of the patient's history were reviewed and updated as appropriate: allergies, current medications, past family history, past medical history, past social history, past surgical history and problem list.    Review of Systems   Constitutional: Negative for chills and fever.   HENT: Positive for hearing loss. Negative for congestion, rhinorrhea and sore throat.    Eyes: Negative for discharge and visual disturbance.   Respiratory: Negative for cough and shortness of breath.    Cardiovascular: Positive for chest pain (unchanged long-standing nonexertional which has been felt to be esophageal.).   Gastrointestinal: Negative for abdominal pain, constipation, diarrhea, nausea and vomiting.   Endocrine: Negative for polydipsia.   Genitourinary: Positive for difficulty urinating (nocturia). Negative for dysuria and hematuria.   Musculoskeletal: Positive  for gait problem. Negative for arthralgias and myalgias.   Skin: Negative for rash.   Allergic/Immunologic: Negative.    Neurological: Positive for headaches. Negative for weakness and numbness.   Hematological: Does not bruise/bleed easily.   Psychiatric/Behavioral: Negative for dysphoric mood. The patient is not nervous/anxious.    All other systems reviewed and are negative.      Objective   Physical Exam   Constitutional: She is oriented to person, place, and time. She appears well-developed and well-nourished.   HENT:   Head: Normocephalic and atraumatic.   Profound hearing loss   Eyes: Conjunctivae and EOM are normal.   Neck: Normal range of motion.   Cardiovascular: Normal rate, regular rhythm and normal heart sounds.    Trace peripheral edema.   Pulmonary/Chest: Effort normal and breath sounds normal.   Musculoskeletal: Normal range of motion.   Neurological: She is alert and oriented to person, place, and time. She has normal strength. A sensory deficit (profound hearing loss) is present.   She essentially drags her cane with her in a manner which is placing her at high risk for tripping herself.   Skin: Skin is warm and dry.   Psychiatric: She has a normal mood and affect. Her behavior is normal. Judgment and thought content normal.   Nursing note and vitals reviewed.      Laboratory studies from last visit are reviewed--CBC TSH CMP and CBC were essentially unremarkable other than recent renal function.    Check today showed that her blood pressure machine is erroneously giving substantially elevated readings.    Discussed switching her to low-dose amlodipine for proven her vital outcome improvement  I recommended that since she will not go to physical therapy that unless she is going to be using the cane properly she should discontinue carrying it because it is posing a significant risk of tripping her    Assessment/Plan   Allison was seen today for hypertension.    Diagnoses and all orders for this  visit:    Essential hypertension  -     Urinalysis With / Microscopic If Indicated - Urine, Clean Catch    Nocturia  -     Urinalysis With / Microscopic If Indicated - Urine, Clean Catch    Other orders  -     amLODIPine (NORVASC) 2.5 MG tablet; One tab po QDay for blood pressure        Patient Instructions   Ask Blank to send us immunization records    Stop metoprolol     Start amlodipine 2.5 mg daily--every day--for blood pressure.  This may also help prevent esophagus spasms.      Get BP cuff issues squared away and check blood pressure once in am and once at bedtime and keep log    Schedule recheck two months before refill due.    Let me know if there are problems with tolerating medication or if we do not get to less than 140.      PLEASE BRING ALL OF YOUR MEDICATIONS IN THEIR ORIGINAL BOTTLES TO YOUR NEXT VISIT    IT IS VERY IMPORTANT THAT YOU KEEP A PRINTED LIST OF ALL OF YOUR MEDICATIONS AND DOSES AND OF ALL MEDICATION ALLERGIES WITH YOU/ON YOUR PERSON AT ALL TIMES.  THIS IS OF CRITICAL IMPORTANCE IN THE EVENT THAT YOU ARE INJURED OR ILL AND ARE UNABLE TO CONVEY THIS INFORMATION TO THOSE CARING FOR YOU IN AN EMERGENCY SITUATION.      Recheck two months    They will get records from ENT visits.      We will contact her about her urinalysis.  She was unable to void at last visit

## 2018-03-15 ENCOUNTER — OFFICE VISIT (OUTPATIENT)
Dept: FAMILY MEDICINE CLINIC | Facility: CLINIC | Age: 83
End: 2018-03-15

## 2018-03-15 VITALS
HEIGHT: 62 IN | SYSTOLIC BLOOD PRESSURE: 142 MMHG | HEART RATE: 74 BPM | WEIGHT: 147 LBS | DIASTOLIC BLOOD PRESSURE: 86 MMHG | BODY MASS INDEX: 27.05 KG/M2 | OXYGEN SATURATION: 95 %

## 2018-03-15 DIAGNOSIS — R35.1 NOCTURIA: ICD-10-CM

## 2018-03-15 DIAGNOSIS — I10 ESSENTIAL HYPERTENSION: Primary | ICD-10-CM

## 2018-03-15 PROCEDURE — 99213 OFFICE O/P EST LOW 20 MIN: CPT | Performed by: FAMILY MEDICINE

## 2018-03-15 RX ORDER — TRIAMTERENE AND HYDROCHLOROTHIAZIDE 37.5; 25 MG/1; MG/1
1 CAPSULE ORAL
COMMUNITY
End: 2018-03-15

## 2018-03-15 RX ORDER — AMLODIPINE BESYLATE 2.5 MG/1
TABLET ORAL
Qty: 30 TABLET | Refills: 1 | Status: SHIPPED | OUTPATIENT
Start: 2018-03-15 | End: 2018-03-28

## 2018-03-15 NOTE — PATIENT INSTRUCTIONS
Ask Blank to send us immunization records    Stop metoprolol     Start amlodipine 2.5 mg daily--every day--for blood pressure.  This may also help prevent esophagus spasms.      Get BP cuff issues squared away and check blood pressure once in am and once at bedtime and keep log    Schedule recheck two months before refill due.    Let me know if there are problems with tolerating medication or if we do not get to less than 140.      PLEASE BRING ALL OF YOUR MEDICATIONS IN THEIR ORIGINAL BOTTLES TO YOUR NEXT VISIT    IT IS VERY IMPORTANT THAT YOU KEEP A PRINTED LIST OF ALL OF YOUR MEDICATIONS AND DOSES AND OF ALL MEDICATION ALLERGIES WITH YOU/ON YOUR PERSON AT ALL TIMES.  THIS IS OF CRITICAL IMPORTANCE IN THE EVENT THAT YOU ARE INJURED OR ILL AND ARE UNABLE TO CONVEY THIS INFORMATION TO THOSE CARING FOR YOU IN AN EMERGENCY SITUATION.      Recheck two months

## 2018-03-15 NOTE — PROGRESS NOTES
Subjective   Allison Fitzpatrick is a 83 y.o. female.     History of Present Illness     {Common H&P Review Areas:92889}    Review of Systems   Constitutional: Negative.    Eyes: Negative.    Respiratory: Positive for chest tightness.    Cardiovascular: Negative.    Gastrointestinal: Negative.    Endocrine: Negative.    Genitourinary: Negative.    Musculoskeletal: Negative.    Skin: Negative.    Allergic/Immunologic: Negative.    Neurological: Positive for headaches.   Hematological: Negative.    Psychiatric/Behavioral: Negative.        Objective   Physical Exam    Assessment/Plan   {Assess/PlanSmartLinks:06801}

## 2018-03-16 LAB
APPEARANCE UR: CLEAR
BACTERIA #/AREA URNS HPF: ABNORMAL /HPF
BILIRUB UR QL STRIP: NEGATIVE
COLOR UR: YELLOW
EPI CELLS #/AREA URNS HPF: ABNORMAL /HPF
GLUCOSE UR QL: NEGATIVE
HGB UR QL STRIP: ABNORMAL
KETONES UR QL STRIP: NEGATIVE
LEUKOCYTE ESTERASE UR QL STRIP: ABNORMAL
NITRITE UR QL STRIP: NEGATIVE
PH UR STRIP: 5.5 [PH] (ref 5–8)
PROT UR QL STRIP: NEGATIVE
RBC #/AREA URNS HPF: ABNORMAL /HPF
SP GR UR: 1.02 (ref 1–1.03)
UROBILINOGEN UR STRIP-MCNC: ABNORMAL MG/DL
WBC #/AREA URNS HPF: ABNORMAL /HPF

## 2018-03-28 ENCOUNTER — APPOINTMENT (OUTPATIENT)
Dept: CARDIOLOGY | Facility: HOSPITAL | Age: 83
End: 2018-03-28

## 2018-03-28 ENCOUNTER — APPOINTMENT (OUTPATIENT)
Dept: GENERAL RADIOLOGY | Facility: HOSPITAL | Age: 83
End: 2018-03-28

## 2018-03-28 ENCOUNTER — APPOINTMENT (OUTPATIENT)
Dept: CT IMAGING | Facility: HOSPITAL | Age: 83
End: 2018-03-28

## 2018-03-28 ENCOUNTER — HOSPITAL ENCOUNTER (OUTPATIENT)
Facility: HOSPITAL | Age: 83
Setting detail: OBSERVATION
LOS: 1 days | Discharge: HOME | End: 2018-03-30
Attending: EMERGENCY MEDICINE | Admitting: INTERNAL MEDICINE

## 2018-03-28 DIAGNOSIS — S22.32XA CLOSED FRACTURE OF ONE RIB OF LEFT SIDE, INITIAL ENCOUNTER: ICD-10-CM

## 2018-03-28 DIAGNOSIS — R07.89 ATYPICAL CHEST PAIN: ICD-10-CM

## 2018-03-28 DIAGNOSIS — R53.1 GENERALIZED WEAKNESS: ICD-10-CM

## 2018-03-28 DIAGNOSIS — R55 SYNCOPE AND COLLAPSE: Primary | ICD-10-CM

## 2018-03-28 DIAGNOSIS — S00.83XA TRAUMATIC HEMATOMA OF FOREHEAD, INITIAL ENCOUNTER: ICD-10-CM

## 2018-03-28 PROBLEM — R07.9 CHEST PAIN: Status: ACTIVE | Noted: 2018-03-28

## 2018-03-28 PROBLEM — E78.5 HLD (HYPERLIPIDEMIA): Status: ACTIVE | Noted: 2018-03-28

## 2018-03-28 LAB
ALBUMIN SERPL-MCNC: 4.4 G/DL (ref 3.5–5.2)
ALBUMIN/GLOB SERPL: 1.4 G/DL
ALP SERPL-CCNC: 61 U/L (ref 39–117)
ALT SERPL W P-5'-P-CCNC: 16 U/L (ref 1–33)
ANION GAP SERPL CALCULATED.3IONS-SCNC: 13.1 MMOL/L
AST SERPL-CCNC: 20 U/L (ref 1–32)
BACTERIA UR QL AUTO: ABNORMAL /HPF
BASOPHILS # BLD AUTO: 0.02 10*3/MM3 (ref 0–0.2)
BASOPHILS NFR BLD AUTO: 0.2 % (ref 0–1.5)
BILIRUB SERPL-MCNC: 0.4 MG/DL (ref 0.1–1.2)
BILIRUB UR QL STRIP: NEGATIVE
BUN BLD-MCNC: 32 MG/DL (ref 8–23)
BUN/CREAT SERPL: 30.5 (ref 7–25)
CALCIUM SPEC-SCNC: 9.8 MG/DL (ref 8.6–10.5)
CHLORIDE SERPL-SCNC: 101 MMOL/L (ref 98–107)
CLARITY UR: ABNORMAL
CO2 SERPL-SCNC: 27.9 MMOL/L (ref 22–29)
COLOR UR: YELLOW
CREAT BLD-MCNC: 1.05 MG/DL (ref 0.57–1)
DEPRECATED RDW RBC AUTO: 46.6 FL (ref 37–54)
EOSINOPHIL # BLD AUTO: 0.24 10*3/MM3 (ref 0–0.7)
EOSINOPHIL NFR BLD AUTO: 2.3 % (ref 0.3–6.2)
ERYTHROCYTE [DISTWIDTH] IN BLOOD BY AUTOMATED COUNT: 13.8 % (ref 11.7–13)
GFR SERPL CREATININE-BSD FRML MDRD: 50 ML/MIN/1.73
GLOBULIN UR ELPH-MCNC: 3.1 GM/DL
GLUCOSE BLD-MCNC: 109 MG/DL (ref 65–99)
GLUCOSE UR STRIP-MCNC: NEGATIVE MG/DL
HCT VFR BLD AUTO: 38.4 % (ref 35.6–45.5)
HGB BLD-MCNC: 12.6 G/DL (ref 11.9–15.5)
HGB UR QL STRIP.AUTO: ABNORMAL
HYALINE CASTS UR QL AUTO: ABNORMAL /LPF
IMM GRANULOCYTES # BLD: 0.05 10*3/MM3 (ref 0–0.03)
IMM GRANULOCYTES NFR BLD: 0.5 % (ref 0–0.5)
INR PPP: 1.04 (ref 0.9–1.1)
KETONES UR QL STRIP: NEGATIVE
LEUKOCYTE ESTERASE UR QL STRIP.AUTO: ABNORMAL
LYMPHOCYTES # BLD AUTO: 1.8 10*3/MM3 (ref 0.9–4.8)
LYMPHOCYTES NFR BLD AUTO: 17.4 % (ref 19.6–45.3)
MCH RBC QN AUTO: 30.3 PG (ref 26.9–32)
MCHC RBC AUTO-ENTMCNC: 32.8 G/DL (ref 32.4–36.3)
MCV RBC AUTO: 92.3 FL (ref 80.5–98.2)
MONOCYTES # BLD AUTO: 0.78 10*3/MM3 (ref 0.2–1.2)
MONOCYTES NFR BLD AUTO: 7.6 % (ref 5–12)
NEUTROPHILS # BLD AUTO: 7.44 10*3/MM3 (ref 1.9–8.1)
NEUTROPHILS NFR BLD AUTO: 72 % (ref 42.7–76)
NITRITE UR QL STRIP: NEGATIVE
PH UR STRIP.AUTO: 7 [PH] (ref 5–8)
PLATELET # BLD AUTO: 206 10*3/MM3 (ref 140–500)
PMV BLD AUTO: 10.9 FL (ref 6–12)
POTASSIUM BLD-SCNC: 4 MMOL/L (ref 3.5–5.2)
PROT SERPL-MCNC: 7.5 G/DL (ref 6–8.5)
PROT UR QL STRIP: NEGATIVE
PROTHROMBIN TIME: 13.4 SECONDS (ref 11.7–14.2)
RBC # BLD AUTO: 4.16 10*6/MM3 (ref 3.9–5.2)
RBC # UR: ABNORMAL /HPF
REF LAB TEST METHOD: ABNORMAL
SODIUM BLD-SCNC: 142 MMOL/L (ref 136–145)
SP GR UR STRIP: 1.01 (ref 1–1.03)
SQUAMOUS #/AREA URNS HPF: ABNORMAL /HPF
TROPONIN T SERPL-MCNC: <0.01 NG/ML (ref 0–0.03)
TROPONIN T SERPL-MCNC: <0.01 NG/ML (ref 0–0.03)
UROBILINOGEN UR QL STRIP: ABNORMAL
WBC NRBC COR # BLD: 10.33 10*3/MM3 (ref 4.5–10.7)
WBC UR QL AUTO: ABNORMAL /HPF

## 2018-03-28 PROCEDURE — 71101 X-RAY EXAM UNILAT RIBS/CHEST: CPT

## 2018-03-28 PROCEDURE — 93226 XTRNL ECG REC<48 HR SCAN A/R: CPT

## 2018-03-28 PROCEDURE — 74177 CT ABD & PELVIS W/CONTRAST: CPT

## 2018-03-28 PROCEDURE — 71046 X-RAY EXAM CHEST 2 VIEWS: CPT

## 2018-03-28 PROCEDURE — 99221 1ST HOSP IP/OBS SF/LOW 40: CPT | Performed by: INTERNAL MEDICINE

## 2018-03-28 PROCEDURE — 93225 XTRNL ECG REC<48 HRS REC: CPT

## 2018-03-28 PROCEDURE — 93005 ELECTROCARDIOGRAM TRACING: CPT | Performed by: EMERGENCY MEDICINE

## 2018-03-28 PROCEDURE — 85025 COMPLETE CBC W/AUTO DIFF WBC: CPT | Performed by: EMERGENCY MEDICINE

## 2018-03-28 PROCEDURE — 96372 THER/PROPH/DIAG INJ SC/IM: CPT

## 2018-03-28 PROCEDURE — 81001 URINALYSIS AUTO W/SCOPE: CPT | Performed by: EMERGENCY MEDICINE

## 2018-03-28 PROCEDURE — 93880 EXTRACRANIAL BILAT STUDY: CPT

## 2018-03-28 PROCEDURE — 25010000002 IOPAMIDOL 61 % SOLUTION: Performed by: EMERGENCY MEDICINE

## 2018-03-28 PROCEDURE — 84484 ASSAY OF TROPONIN QUANT: CPT | Performed by: EMERGENCY MEDICINE

## 2018-03-28 PROCEDURE — 93010 ELECTROCARDIOGRAM REPORT: CPT | Performed by: INTERNAL MEDICINE

## 2018-03-28 PROCEDURE — 85610 PROTHROMBIN TIME: CPT | Performed by: EMERGENCY MEDICINE

## 2018-03-28 PROCEDURE — 25010000002 ENOXAPARIN PER 10 MG: Performed by: INTERNAL MEDICINE

## 2018-03-28 PROCEDURE — 80053 COMPREHEN METABOLIC PANEL: CPT | Performed by: EMERGENCY MEDICINE

## 2018-03-28 PROCEDURE — 99285 EMERGENCY DEPT VISIT HI MDM: CPT

## 2018-03-28 PROCEDURE — 84484 ASSAY OF TROPONIN QUANT: CPT | Performed by: INTERNAL MEDICINE

## 2018-03-28 PROCEDURE — 70450 CT HEAD/BRAIN W/O DYE: CPT

## 2018-03-28 RX ORDER — LIDOCAINE 50 MG/G
1 PATCH TOPICAL
Status: DISCONTINUED | OUTPATIENT
Start: 2018-03-28 | End: 2018-03-30 | Stop reason: HOSPADM

## 2018-03-28 RX ORDER — SODIUM CHLORIDE 0.9 % (FLUSH) 0.9 %
1-10 SYRINGE (ML) INJECTION AS NEEDED
Status: DISCONTINUED | OUTPATIENT
Start: 2018-03-28 | End: 2018-03-30 | Stop reason: HOSPADM

## 2018-03-28 RX ORDER — ASPIRIN 325 MG
325 TABLET, DELAYED RELEASE (ENTERIC COATED) ORAL DAILY
Status: DISCONTINUED | OUTPATIENT
Start: 2018-03-28 | End: 2018-03-30 | Stop reason: HOSPADM

## 2018-03-28 RX ORDER — SODIUM CHLORIDE 0.9 % (FLUSH) 0.9 %
10 SYRINGE (ML) INJECTION AS NEEDED
Status: DISCONTINUED | OUTPATIENT
Start: 2018-03-28 | End: 2018-03-30 | Stop reason: HOSPADM

## 2018-03-28 RX ORDER — PANTOPRAZOLE SODIUM 40 MG/1
40 TABLET, DELAYED RELEASE ORAL
Status: DISCONTINUED | OUTPATIENT
Start: 2018-03-28 | End: 2018-03-30 | Stop reason: HOSPADM

## 2018-03-28 RX ORDER — LANOLIN ALCOHOL/MO/W.PET/CERES
1000 CREAM (GRAM) TOPICAL DAILY
COMMUNITY
End: 2020-12-08 | Stop reason: HOSPADM

## 2018-03-28 RX ORDER — AMLODIPINE BESYLATE 2.5 MG/1
2.5 TABLET ORAL DAILY
COMMUNITY
End: 2018-05-15

## 2018-03-28 RX ORDER — ACETAMINOPHEN 325 MG/1
650 TABLET ORAL EVERY 4 HOURS PRN
Status: DISCONTINUED | OUTPATIENT
Start: 2018-03-28 | End: 2018-03-30 | Stop reason: HOSPADM

## 2018-03-28 RX ORDER — ONDANSETRON 2 MG/ML
4 INJECTION INTRAMUSCULAR; INTRAVENOUS EVERY 6 HOURS PRN
Status: DISCONTINUED | OUTPATIENT
Start: 2018-03-28 | End: 2018-03-30 | Stop reason: HOSPADM

## 2018-03-28 RX ORDER — SODIUM CHLORIDE 9 MG/ML
100 INJECTION, SOLUTION INTRAVENOUS CONTINUOUS
Status: DISCONTINUED | OUTPATIENT
Start: 2018-03-28 | End: 2018-03-29

## 2018-03-28 RX ADMIN — ASPIRIN 325 MG: 325 TABLET, DELAYED RELEASE ORAL at 21:02

## 2018-03-28 RX ADMIN — ENOXAPARIN SODIUM 40 MG: 40 INJECTION SUBCUTANEOUS at 15:26

## 2018-03-28 RX ADMIN — SODIUM CHLORIDE 100 ML/HR: 9 INJECTION, SOLUTION INTRAVENOUS at 15:26

## 2018-03-28 RX ADMIN — METOPROLOL TARTRATE 12.5 MG: 25 TABLET ORAL at 21:02

## 2018-03-28 RX ADMIN — IOPAMIDOL 85 ML: 612 INJECTION, SOLUTION INTRAVENOUS at 10:34

## 2018-03-28 RX ADMIN — NITROGLYCERIN 0.5 INCH: 20 OINTMENT TOPICAL at 21:02

## 2018-03-28 RX ADMIN — LIDOCAINE 1 PATCH: 50 PATCH CUTANEOUS at 17:35

## 2018-03-28 RX ADMIN — ACETAMINOPHEN 650 MG: 325 TABLET ORAL at 15:26

## 2018-03-29 ENCOUNTER — APPOINTMENT (OUTPATIENT)
Dept: CARDIOLOGY | Facility: HOSPITAL | Age: 83
End: 2018-03-29
Attending: INTERNAL MEDICINE

## 2018-03-29 ENCOUNTER — APPOINTMENT (OUTPATIENT)
Dept: NUCLEAR MEDICINE | Facility: HOSPITAL | Age: 83
End: 2018-03-29

## 2018-03-29 LAB
ANION GAP SERPL CALCULATED.3IONS-SCNC: 11.5 MMOL/L
ASCENDING AORTA: 3.6 CM
AV HCM GRAD VALS: 10 MMHG
BASOPHILS # BLD AUTO: 0.02 10*3/MM3 (ref 0–0.2)
BASOPHILS NFR BLD AUTO: 0.3 % (ref 0–1.5)
BH CV ECHO MEAS - ACS: 2 CM
BH CV ECHO MEAS - AO MEAN PG (FULL): 1 MMHG
BH CV ECHO MEAS - AO MEAN PG: 7 MMHG
BH CV ECHO MEAS - AO ROOT AREA (BSA CORRECTED): 1.8
BH CV ECHO MEAS - AO ROOT AREA: 7.5 CM^2
BH CV ECHO MEAS - AO ROOT DIAM: 3.1 CM
BH CV ECHO MEAS - AO V2 MEAN: 129 CM/SEC
BH CV ECHO MEAS - AO V2 VTI: 36.4 CM
BH CV ECHO MEAS - AVA(I,A): 2.4 CM^2
BH CV ECHO MEAS - AVA(I,D): 2.4 CM^2
BH CV ECHO MEAS - BSA(HAYCOCK): 1.7 M^2
BH CV ECHO MEAS - BSA: 1.7 M^2
BH CV ECHO MEAS - BZI_BMI: 25.5 KILOGRAMS/M^2
BH CV ECHO MEAS - BZI_METRIC_HEIGHT: 160 CM
BH CV ECHO MEAS - BZI_METRIC_WEIGHT: 65.3 KG
BH CV ECHO MEAS - CONTRAST EF (2CH): 76.5 ML/M^2
BH CV ECHO MEAS - CONTRAST EF 4CH: 68.2 ML/M^2
BH CV ECHO MEAS - EDV(MOD-SP2): 102 ML
BH CV ECHO MEAS - EDV(MOD-SP4): 85 ML
BH CV ECHO MEAS - EDV(TEICH): 135.3 ML
BH CV ECHO MEAS - EF(CUBED): 75.9 %
BH CV ECHO MEAS - EF(MOD-SP2): 76.5 %
BH CV ECHO MEAS - EF(MOD-SP4): 68.2 %
BH CV ECHO MEAS - EF(TEICH): 67.4 %
BH CV ECHO MEAS - ESV(MOD-SP2): 24 ML
BH CV ECHO MEAS - ESV(MOD-SP4): 27 ML
BH CV ECHO MEAS - ESV(TEICH): 44.1 ML
BH CV ECHO MEAS - FS: 37.7 %
BH CV ECHO MEAS - IVS/LVPW: 1.3
BH CV ECHO MEAS - IVSD: 1.2 CM
BH CV ECHO MEAS - LAT PEAK E' VEL: 9 CM/SEC
BH CV ECHO MEAS - LV DIASTOLIC VOL/BSA (35-75): 50.5 ML/M^2
BH CV ECHO MEAS - LV MASS(C)D: 213.9 GRAMS
BH CV ECHO MEAS - LV MASS(C)DI: 127.2 GRAMS/M^2
BH CV ECHO MEAS - LV MEAN PG: 6 MMHG
BH CV ECHO MEAS - LV SYSTOLIC VOL/BSA (12-30): 16.1 ML/M^2
BH CV ECHO MEAS - LV V1 MEAN: 117 CM/SEC
BH CV ECHO MEAS - LV V1 VTI: 30.7 CM
BH CV ECHO MEAS - LVIDD: 5.3 CM
BH CV ECHO MEAS - LVIDS: 3.3 CM
BH CV ECHO MEAS - LVLD AP2: 7.7 CM
BH CV ECHO MEAS - LVLD AP4: 7.5 CM
BH CV ECHO MEAS - LVLS AP2: 6 CM
BH CV ECHO MEAS - LVLS AP4: 6 CM
BH CV ECHO MEAS - LVOT AREA (M): 2.8 CM^2
BH CV ECHO MEAS - LVOT AREA: 2.8 CM^2
BH CV ECHO MEAS - LVOT DIAM: 1.9 CM
BH CV ECHO MEAS - LVPWD: 0.9 CM
BH CV ECHO MEAS - MED PEAK E' VEL: 5 CM/SEC
BH CV ECHO MEAS - MR MAX PG: 163.3 MMHG
BH CV ECHO MEAS - MR MAX VEL: 639 CM/SEC
BH CV ECHO MEAS - MV A DUR: 0.1 SEC
BH CV ECHO MEAS - MV A MAX VEL: 115 CM/SEC
BH CV ECHO MEAS - MV DEC SLOPE: 294 CM/SEC^2
BH CV ECHO MEAS - MV DEC TIME: 0.23 SEC
BH CV ECHO MEAS - MV E MAX VEL: 67.1 CM/SEC
BH CV ECHO MEAS - MV E/A: 0.58
BH CV ECHO MEAS - MV MEAN PG: 2 MMHG
BH CV ECHO MEAS - MV P1/2T MAX VEL: 66.6 CM/SEC
BH CV ECHO MEAS - MV P1/2T: 66.3 MSEC
BH CV ECHO MEAS - MV V2 MEAN: 75.2 CM/SEC
BH CV ECHO MEAS - MV V2 VTI: 19.8 CM
BH CV ECHO MEAS - MVA P1/2T LCG: 3.3 CM^2
BH CV ECHO MEAS - MVA(P1/2T): 3.3 CM^2
BH CV ECHO MEAS - MVA(VTI): 4.4 CM^2
BH CV ECHO MEAS - PA ACC SLOPE: 16.5 CM/SEC^2
BH CV ECHO MEAS - PA ACC TIME: 0.12 SEC
BH CV ECHO MEAS - PA MAX PG (FULL): 2.7 MMHG
BH CV ECHO MEAS - PA MAX PG: 6.4 MMHG
BH CV ECHO MEAS - PA PR(ACCEL): 25 MMHG
BH CV ECHO MEAS - PA V2 MAX: 126 CM/SEC
BH CV ECHO MEAS - PULM A REVS DUR: 0.08 SEC
BH CV ECHO MEAS - PULM A REVS VEL: 39 CM/SEC
BH CV ECHO MEAS - PULM DIAS VEL: 53.8 CM/SEC
BH CV ECHO MEAS - PULM S/D: 1.2
BH CV ECHO MEAS - PULM SYS VEL: 63.2 CM/SEC
BH CV ECHO MEAS - PVA(V,A): 2.4 CM^2
BH CV ECHO MEAS - PVA(V,D): 2.4 CM^2
BH CV ECHO MEAS - QP/QS: 0.67
BH CV ECHO MEAS - RAP SYSTOLE: 8 MMHG
BH CV ECHO MEAS - RV MAX PG: 3.6 MMHG
BH CV ECHO MEAS - RV MEAN PG: 2 MMHG
BH CV ECHO MEAS - RV V1 MAX: 95.5 CM/SEC
BH CV ECHO MEAS - RV V1 MEAN: 64.6 CM/SEC
BH CV ECHO MEAS - RV V1 VTI: 18.6 CM
BH CV ECHO MEAS - RVOT AREA: 3.1 CM^2
BH CV ECHO MEAS - RVOT DIAM: 2 CM
BH CV ECHO MEAS - RVSP: 48 MMHG
BH CV ECHO MEAS - SI(AO): 163.4 ML/M^2
BH CV ECHO MEAS - SI(CUBED): 67.2 ML/M^2
BH CV ECHO MEAS - SI(LVOT): 51.8 ML/M^2
BH CV ECHO MEAS - SI(MOD-SP2): 46.4 ML/M^2
BH CV ECHO MEAS - SI(MOD-SP4): 34.5 ML/M^2
BH CV ECHO MEAS - SI(TEICH): 54.2 ML/M^2
BH CV ECHO MEAS - SUP REN AO DIAM: 1.8 CM
BH CV ECHO MEAS - SV(AO): 274.7 ML
BH CV ECHO MEAS - SV(CUBED): 112.9 ML
BH CV ECHO MEAS - SV(LVOT): 87 ML
BH CV ECHO MEAS - SV(MOD-SP2): 78 ML
BH CV ECHO MEAS - SV(MOD-SP4): 58 ML
BH CV ECHO MEAS - SV(RVOT): 58.4 ML
BH CV ECHO MEAS - SV(TEICH): 91.2 ML
BH CV ECHO MEAS - TAPSE (>1.6): 2.2 CM2
BH CV ECHO MEAS - TR MAX VEL: 316 CM/SEC
BH CV STRESS COMMENTS STAGE 1: NORMAL
BH CV STRESS DOSE REGADENOSON STAGE 1: 0.4
BH CV STRESS DURATION MIN STAGE 1: 0
BH CV STRESS DURATION SEC STAGE 1: 10
BH CV STRESS PROTOCOL 1: NORMAL
BH CV STRESS RECOVERY BP: NORMAL MMHG
BH CV STRESS RECOVERY HR: 101 BPM
BH CV STRESS STAGE 1: 1
BH CV XLRA - RV BASE: 3.2 CM
BH CV XLRA - TDI S': 14 CM/SEC
BH CV XLRA MEAS LEFT CCA RATIO VEL: 62.1 CM/SEC
BH CV XLRA MEAS LEFT DIST CCA EDV: 18.2 CM/SEC
BH CV XLRA MEAS LEFT DIST CCA PSV: 62.1 CM/SEC
BH CV XLRA MEAS LEFT DIST ICA EDV: -22.4 CM/SEC
BH CV XLRA MEAS LEFT DIST ICA PSV: -55.4 CM/SEC
BH CV XLRA MEAS LEFT ICA RATIO VEL: -53.4 CM/SEC
BH CV XLRA MEAS LEFT ICA/CCA RATIO: -0.86
BH CV XLRA MEAS LEFT MID ICA EDV: -22.8 CM/SEC
BH CV XLRA MEAS LEFT MID ICA PSV: -62.9 CM/SEC
BH CV XLRA MEAS LEFT PROX CCA EDV: -15.2 CM/SEC
BH CV XLRA MEAS LEFT PROX CCA PSV: -65.7 CM/SEC
BH CV XLRA MEAS LEFT PROX ECA EDV: 12.3 CM/SEC
BH CV XLRA MEAS LEFT PROX ECA PSV: 69.2 CM/SEC
BH CV XLRA MEAS LEFT PROX ICA EDV: -14.1 CM/SEC
BH CV XLRA MEAS LEFT PROX ICA PSV: -53.4 CM/SEC
BH CV XLRA MEAS LEFT PROX SCLA PSV: 116 CM/SEC
BH CV XLRA MEAS LEFT VERTEBRAL A EDV: 10.2 CM/SEC
BH CV XLRA MEAS LEFT VERTEBRAL A PSV: 36.1 CM/SEC
BH CV XLRA MEAS RIGHT CCA RATIO VEL: 63.9 CM/SEC
BH CV XLRA MEAS RIGHT DIST CCA EDV: 18.8 CM/SEC
BH CV XLRA MEAS RIGHT DIST CCA PSV: 63.9 CM/SEC
BH CV XLRA MEAS RIGHT DIST ICA EDV: -18.2 CM/SEC
BH CV XLRA MEAS RIGHT DIST ICA PSV: -47.1 CM/SEC
BH CV XLRA MEAS RIGHT ICA RATIO VEL: -42.4 CM/SEC
BH CV XLRA MEAS RIGHT ICA/CCA RATIO: -0.66
BH CV XLRA MEAS RIGHT MID ICA EDV: -15.7 CM/SEC
BH CV XLRA MEAS RIGHT MID ICA PSV: -51.9 CM/SEC
BH CV XLRA MEAS RIGHT PROX CCA EDV: 18.2 CM/SEC
BH CV XLRA MEAS RIGHT PROX CCA PSV: 67.5 CM/SEC
BH CV XLRA MEAS RIGHT PROX ECA EDV: -8.4 CM/SEC
BH CV XLRA MEAS RIGHT PROX ECA PSV: -55 CM/SEC
BH CV XLRA MEAS RIGHT PROX ICA EDV: -14.5 CM/SEC
BH CV XLRA MEAS RIGHT PROX ICA PSV: -42.4 CM/SEC
BH CV XLRA MEAS RIGHT PROX SCLA PSV: 84.9 CM/SEC
BH CV XLRA MEAS RIGHT VERTEBRAL A EDV: -14.1 CM/SEC
BH CV XLRA MEAS RIGHT VERTEBRAL A PSV: -41.2 CM/SEC
BUN BLD-MCNC: 19 MG/DL (ref 8–23)
BUN/CREAT SERPL: 22.1 (ref 7–25)
CALCIUM SPEC-SCNC: 8.7 MG/DL (ref 8.6–10.5)
CHLORIDE SERPL-SCNC: 105 MMOL/L (ref 98–107)
CO2 SERPL-SCNC: 24.5 MMOL/L (ref 22–29)
CREAT BLD-MCNC: 0.86 MG/DL (ref 0.57–1)
DEPRECATED RDW RBC AUTO: 47.5 FL (ref 37–54)
E/E' RATIO: 11
EOSINOPHIL # BLD AUTO: 0.24 10*3/MM3 (ref 0–0.7)
EOSINOPHIL NFR BLD AUTO: 3.1 % (ref 0.3–6.2)
ERYTHROCYTE [DISTWIDTH] IN BLOOD BY AUTOMATED COUNT: 13.6 % (ref 11.7–13)
GFR SERPL CREATININE-BSD FRML MDRD: 63 ML/MIN/1.73
GLUCOSE BLD-MCNC: 98 MG/DL (ref 65–99)
HCT VFR BLD AUTO: 37.5 % (ref 35.6–45.5)
HGB BLD-MCNC: 11.7 G/DL (ref 11.9–15.5)
IMM GRANULOCYTES # BLD: 0.03 10*3/MM3 (ref 0–0.03)
IMM GRANULOCYTES NFR BLD: 0.4 % (ref 0–0.5)
LEFT ARM BP: NORMAL MMHG
LEFT ATRIUM VOLUME INDEX: 28 ML/M2
LV EF NUC BP: 70 %
LYMPHOCYTES # BLD AUTO: 1.59 10*3/MM3 (ref 0.9–4.8)
LYMPHOCYTES NFR BLD AUTO: 20.3 % (ref 19.6–45.3)
MAXIMAL PREDICTED HEART RATE: 137 BPM
MCH RBC QN AUTO: 29.8 PG (ref 26.9–32)
MCHC RBC AUTO-ENTMCNC: 31.2 G/DL (ref 32.4–36.3)
MCV RBC AUTO: 95.4 FL (ref 80.5–98.2)
MONOCYTES # BLD AUTO: 0.87 10*3/MM3 (ref 0.2–1.2)
MONOCYTES NFR BLD AUTO: 11.1 % (ref 5–12)
NEUTROPHILS # BLD AUTO: 5.08 10*3/MM3 (ref 1.9–8.1)
NEUTROPHILS NFR BLD AUTO: 64.8 % (ref 42.7–76)
PERCENT MAX PREDICTED HR: 73.72 %
PLATELET # BLD AUTO: 179 10*3/MM3 (ref 140–500)
PMV BLD AUTO: 10.2 FL (ref 6–12)
POTASSIUM BLD-SCNC: 3.9 MMOL/L (ref 3.5–5.2)
RBC # BLD AUTO: 3.93 10*6/MM3 (ref 3.9–5.2)
RIGHT ARM BP: NORMAL MMHG
SINUS: 2.8 CM
SODIUM BLD-SCNC: 141 MMOL/L (ref 136–145)
STJ: 2.6 CM
STRESS BASELINE BP: NORMAL MMHG
STRESS BASELINE HR: 91 BPM
STRESS PERCENT HR: 87 %
STRESS POST PEAK BP: NORMAL MMHG
STRESS POST PEAK HR: 101 BPM
STRESS TARGET HR: 116 BPM
TROPONIN T SERPL-MCNC: <0.01 NG/ML (ref 0–0.03)
WBC NRBC COR # BLD: 7.83 10*3/MM3 (ref 4.5–10.7)

## 2018-03-29 PROCEDURE — 78452 HT MUSCLE IMAGE SPECT MULT: CPT | Performed by: INTERNAL MEDICINE

## 2018-03-29 PROCEDURE — G8978 MOBILITY CURRENT STATUS: HCPCS

## 2018-03-29 PROCEDURE — G0378 HOSPITAL OBSERVATION PER HR: HCPCS

## 2018-03-29 PROCEDURE — 96360 HYDRATION IV INFUSION INIT: CPT

## 2018-03-29 PROCEDURE — 93306 TTE W/DOPPLER COMPLETE: CPT | Performed by: INTERNAL MEDICINE

## 2018-03-29 PROCEDURE — 96372 THER/PROPH/DIAG INJ SC/IM: CPT

## 2018-03-29 PROCEDURE — 0 TECHNETIUM SESTAMIBI: Performed by: INTERNAL MEDICINE

## 2018-03-29 PROCEDURE — 97162 PT EVAL MOD COMPLEX 30 MIN: CPT

## 2018-03-29 PROCEDURE — 85025 COMPLETE CBC W/AUTO DIFF WBC: CPT | Performed by: INTERNAL MEDICINE

## 2018-03-29 PROCEDURE — A9500 TC99M SESTAMIBI: HCPCS | Performed by: INTERNAL MEDICINE

## 2018-03-29 PROCEDURE — 78452 HT MUSCLE IMAGE SPECT MULT: CPT

## 2018-03-29 PROCEDURE — 93018 CV STRESS TEST I&R ONLY: CPT | Performed by: INTERNAL MEDICINE

## 2018-03-29 PROCEDURE — 93017 CV STRESS TEST TRACING ONLY: CPT

## 2018-03-29 PROCEDURE — 25010000002 REGADENOSON 0.4 MG/5ML SOLUTION: Performed by: INTERNAL MEDICINE

## 2018-03-29 PROCEDURE — 99232 SBSQ HOSP IP/OBS MODERATE 35: CPT | Performed by: INTERNAL MEDICINE

## 2018-03-29 PROCEDURE — 80048 BASIC METABOLIC PNL TOTAL CA: CPT | Performed by: INTERNAL MEDICINE

## 2018-03-29 PROCEDURE — 84484 ASSAY OF TROPONIN QUANT: CPT | Performed by: INTERNAL MEDICINE

## 2018-03-29 PROCEDURE — 25010000002 ENOXAPARIN PER 10 MG: Performed by: NURSE PRACTITIONER

## 2018-03-29 PROCEDURE — G8979 MOBILITY GOAL STATUS: HCPCS

## 2018-03-29 PROCEDURE — 96361 HYDRATE IV INFUSION ADD-ON: CPT

## 2018-03-29 PROCEDURE — 93306 TTE W/DOPPLER COMPLETE: CPT

## 2018-03-29 PROCEDURE — 97110 THERAPEUTIC EXERCISES: CPT

## 2018-03-29 RX ADMIN — ASPIRIN 325 MG: 325 TABLET, DELAYED RELEASE ORAL at 11:01

## 2018-03-29 RX ADMIN — METOPROLOL TARTRATE 12.5 MG: 25 TABLET ORAL at 20:33

## 2018-03-29 RX ADMIN — ACETAMINOPHEN 650 MG: 325 TABLET ORAL at 11:01

## 2018-03-29 RX ADMIN — ACETAMINOPHEN 650 MG: 325 TABLET ORAL at 16:55

## 2018-03-29 RX ADMIN — LIDOCAINE 1 PATCH: 50 PATCH CUTANEOUS at 11:01

## 2018-03-29 RX ADMIN — TECHNETIUM TC 99M SESTAMIBI 1 DOSE: 1 INJECTION INTRAVENOUS at 07:00

## 2018-03-29 RX ADMIN — ENOXAPARIN SODIUM 30 MG: 30 INJECTION SUBCUTANEOUS at 16:56

## 2018-03-29 RX ADMIN — METOPROLOL TARTRATE 12.5 MG: 25 TABLET ORAL at 11:01

## 2018-03-29 RX ADMIN — ACETAMINOPHEN 650 MG: 325 TABLET ORAL at 03:04

## 2018-03-29 RX ADMIN — REGADENOSON 0.4 MG: 0.08 INJECTION, SOLUTION INTRAVENOUS at 09:15

## 2018-03-29 RX ADMIN — NITROGLYCERIN 0.5 INCH: 20 OINTMENT TOPICAL at 06:26

## 2018-03-29 RX ADMIN — SODIUM CHLORIDE 100 ML/HR: 9 INJECTION, SOLUTION INTRAVENOUS at 11:02

## 2018-03-29 RX ADMIN — TECHNETIUM TC 99M SESTAMIBI 1 DOSE: 1 INJECTION INTRAVENOUS at 09:15

## 2018-03-30 VITALS
HEART RATE: 76 BPM | SYSTOLIC BLOOD PRESSURE: 183 MMHG | WEIGHT: 144 LBS | TEMPERATURE: 98.4 F | OXYGEN SATURATION: 93 % | DIASTOLIC BLOOD PRESSURE: 94 MMHG | RESPIRATION RATE: 18 BRPM | BODY MASS INDEX: 25.52 KG/M2 | HEIGHT: 63 IN

## 2018-03-30 LAB
ANION GAP SERPL CALCULATED.3IONS-SCNC: 13.7 MMOL/L
BASOPHILS # BLD AUTO: 0.03 10*3/MM3 (ref 0–0.2)
BASOPHILS NFR BLD AUTO: 0.4 % (ref 0–1.5)
BUN BLD-MCNC: 22 MG/DL (ref 8–23)
BUN/CREAT SERPL: 22.2 (ref 7–25)
CALCIUM SPEC-SCNC: 9.2 MG/DL (ref 8.6–10.5)
CHLORIDE SERPL-SCNC: 105 MMOL/L (ref 98–107)
CO2 SERPL-SCNC: 23.3 MMOL/L (ref 22–29)
CREAT BLD-MCNC: 0.99 MG/DL (ref 0.57–1)
DEPRECATED RDW RBC AUTO: 47.5 FL (ref 37–54)
EOSINOPHIL # BLD AUTO: 0.32 10*3/MM3 (ref 0–0.7)
EOSINOPHIL NFR BLD AUTO: 4.1 % (ref 0.3–6.2)
ERYTHROCYTE [DISTWIDTH] IN BLOOD BY AUTOMATED COUNT: 13.8 % (ref 11.7–13)
GFR SERPL CREATININE-BSD FRML MDRD: 54 ML/MIN/1.73
GLUCOSE BLD-MCNC: 102 MG/DL (ref 65–99)
HCT VFR BLD AUTO: 36.7 % (ref 35.6–45.5)
HGB BLD-MCNC: 11.5 G/DL (ref 11.9–15.5)
IMM GRANULOCYTES # BLD: 0.02 10*3/MM3 (ref 0–0.03)
IMM GRANULOCYTES NFR BLD: 0.3 % (ref 0–0.5)
LYMPHOCYTES # BLD AUTO: 1.86 10*3/MM3 (ref 0.9–4.8)
LYMPHOCYTES NFR BLD AUTO: 24 % (ref 19.6–45.3)
MCH RBC QN AUTO: 29.7 PG (ref 26.9–32)
MCHC RBC AUTO-ENTMCNC: 31.3 G/DL (ref 32.4–36.3)
MCV RBC AUTO: 94.8 FL (ref 80.5–98.2)
MONOCYTES # BLD AUTO: 0.78 10*3/MM3 (ref 0.2–1.2)
MONOCYTES NFR BLD AUTO: 10.1 % (ref 5–12)
NEUTROPHILS # BLD AUTO: 4.73 10*3/MM3 (ref 1.9–8.1)
NEUTROPHILS NFR BLD AUTO: 61.1 % (ref 42.7–76)
PLATELET # BLD AUTO: 174 10*3/MM3 (ref 140–500)
PMV BLD AUTO: 10.2 FL (ref 6–12)
POTASSIUM BLD-SCNC: 3.8 MMOL/L (ref 3.5–5.2)
RBC # BLD AUTO: 3.87 10*6/MM3 (ref 3.9–5.2)
SODIUM BLD-SCNC: 142 MMOL/L (ref 136–145)
WBC NRBC COR # BLD: 7.74 10*3/MM3 (ref 4.5–10.7)

## 2018-03-30 PROCEDURE — 97110 THERAPEUTIC EXERCISES: CPT

## 2018-03-30 PROCEDURE — 85025 COMPLETE CBC W/AUTO DIFF WBC: CPT | Performed by: INTERNAL MEDICINE

## 2018-03-30 PROCEDURE — 94618 PULMONARY STRESS TESTING: CPT

## 2018-03-30 PROCEDURE — 99213 OFFICE O/P EST LOW 20 MIN: CPT | Performed by: INTERNAL MEDICINE

## 2018-03-30 PROCEDURE — 80048 BASIC METABOLIC PNL TOTAL CA: CPT | Performed by: INTERNAL MEDICINE

## 2018-03-30 PROCEDURE — G0378 HOSPITAL OBSERVATION PER HR: HCPCS

## 2018-03-30 PROCEDURE — 93227 XTRNL ECG REC<48 HR R&I: CPT | Performed by: INTERNAL MEDICINE

## 2018-03-30 RX ORDER — AMLODIPINE BESYLATE 2.5 MG/1
2.5 TABLET ORAL
Status: DISCONTINUED | OUTPATIENT
Start: 2018-03-30 | End: 2018-03-30 | Stop reason: HOSPADM

## 2018-03-30 RX ORDER — LIDOCAINE 50 MG/G
1 PATCH TOPICAL
Qty: 5 PATCH | Refills: 0 | Status: SHIPPED | OUTPATIENT
Start: 2018-03-31 | End: 2018-04-05

## 2018-03-30 RX ADMIN — AMLODIPINE BESYLATE 2.5 MG: 2.5 TABLET ORAL at 14:55

## 2018-03-30 RX ADMIN — ASPIRIN 325 MG: 325 TABLET, DELAYED RELEASE ORAL at 08:30

## 2018-03-30 RX ADMIN — LIDOCAINE 1 PATCH: 50 PATCH CUTANEOUS at 08:29

## 2018-03-30 RX ADMIN — METOPROLOL TARTRATE 12.5 MG: 25 TABLET ORAL at 08:30

## 2018-03-30 RX ADMIN — PANTOPRAZOLE SODIUM 40 MG: 40 TABLET, DELAYED RELEASE ORAL at 06:34

## 2018-04-02 ENCOUNTER — TELEPHONE (OUTPATIENT)
Dept: FAMILY MEDICINE CLINIC | Facility: CLINIC | Age: 83
End: 2018-04-02

## 2018-04-17 ENCOUNTER — TELEPHONE (OUTPATIENT)
Dept: FAMILY MEDICINE CLINIC | Facility: CLINIC | Age: 83
End: 2018-04-17

## 2018-04-17 RX ORDER — METOPROLOL TARTRATE 37.5 MG/1
1 TABLET, FILM COATED ORAL DAILY
Qty: 30 TABLET | Refills: 1 | Status: SHIPPED | OUTPATIENT
Start: 2018-04-17 | End: 2018-05-15

## 2018-04-17 NOTE — TELEPHONE ENCOUNTER
Pt Metoprolol was changed to 1.5 tablets daily upon D/C from hospital. Pt will run out of medication before seeing you in May. Asking of you would sed in a RF?

## 2018-04-18 ENCOUNTER — TELEPHONE (OUTPATIENT)
Dept: FAMILY MEDICINE CLINIC | Facility: CLINIC | Age: 83
End: 2018-04-18

## 2018-04-18 NOTE — TELEPHONE ENCOUNTER
Left detailed VM on daughter phone.  ----- Message from Cheko Owens MD sent at 4/17/2018  5:23 PM EDT -----  Refill for BP med is sent--NOTE--37.5 MG TABLET THIS TIME SO ONLY TAKE ONE TABLET.

## 2018-05-15 ENCOUNTER — OFFICE VISIT (OUTPATIENT)
Dept: FAMILY MEDICINE CLINIC | Facility: CLINIC | Age: 83
End: 2018-05-15

## 2018-05-15 VITALS
SYSTOLIC BLOOD PRESSURE: 130 MMHG | BODY MASS INDEX: 25.16 KG/M2 | DIASTOLIC BLOOD PRESSURE: 86 MMHG | WEIGHT: 142 LBS | HEIGHT: 63 IN | OXYGEN SATURATION: 98 % | HEART RATE: 66 BPM | RESPIRATION RATE: 15 BRPM | TEMPERATURE: 98.7 F

## 2018-05-15 DIAGNOSIS — S22.32XB OPEN FRACTURE OF ONE RIB OF LEFT SIDE, INITIAL ENCOUNTER: ICD-10-CM

## 2018-05-15 DIAGNOSIS — I10 ESSENTIAL HYPERTENSION: Primary | ICD-10-CM

## 2018-05-15 DIAGNOSIS — R55 SYNCOPE AND COLLAPSE: ICD-10-CM

## 2018-05-15 PROCEDURE — 99214 OFFICE O/P EST MOD 30 MIN: CPT | Performed by: FAMILY MEDICINE

## 2018-05-15 RX ORDER — AMLODIPINE BESYLATE 2.5 MG/1
TABLET ORAL
Qty: 30 TABLET | Refills: 1 | Status: SHIPPED | OUTPATIENT
Start: 2018-05-15 | End: 2018-06-26 | Stop reason: DRUGHIGH

## 2018-05-15 NOTE — PATIENT INSTRUCTIONS
Make an appointment to see Dr. Garrett in next month to recheck.     Begin taking metoprolol 25 mg tab--1/2 tab just in am for one week and then 1/2 every other morning for one week and then stop metoprolol    Start amlodipine 2.5 mg every morning when we start doing metoprolol every OTHER morning  UNLESS your BP starts to go up; in that case start immediately. Would want to start sooner, for example, if BP goes to 160/100 or you have symptoms like increased headaches.

## 2018-05-15 NOTE — PROGRESS NOTES
Subjective   Allison Fitzpatrick is a 83 y.o. female.     Comes in for follow-up of retention and after hospitalization for syncope and a fall.  She sustained a significant intrusion of  the head/face and a nondisplaced left 11th rib fracture.  She still has a little intermittent discomfort in the posterior hemithorax on the left.  Is still getting her chronic intermittent head pains.  In early feels okay however.  When all of this happened we had begun tapering her off of the metoprolol with a goal of switching her to amlodipine.  There was some concern in the hospital that the nitroglycerin she used for esophageal spasm may have contributed to her syncope.  She was asked to discontinue that and now when she has heartburn/esophageal pain she uses Maalox with some improvement.  Is having no chest pain or dyspnea.  She has no other complaints at this time.  [place] [facility] [place] any [place]izations other than influenza.         The following portions of the patient's history were reviewed and updated as appropriate: allergies, current medications, past family history, past medical history, past social history, past surgical history and problem list.    Review of Systems   Constitutional: Negative.  Negative for chills and fever.   HENT: Negative for congestion, rhinorrhea and sore throat.    Eyes: Negative.  Negative for discharge and visual disturbance.   Respiratory: Negative.  Negative for cough and shortness of breath.    Cardiovascular: Negative.  Negative for chest pain.   Gastrointestinal: Negative.  Negative for abdominal pain, constipation, diarrhea, nausea and vomiting.   Endocrine: Negative.  Negative for polydipsia.   Genitourinary: Negative.  Negative for dysuria and hematuria.   Musculoskeletal: Positive for back pain. Negative for arthralgias and myalgias.   Skin: Negative.  Negative for rash.   Allergic/Immunologic: Negative.    Neurological: Positive for headaches.  Negative for dizziness, weakness and numbness.   Hematological: Negative.  Does not bruise/bleed easily.   Psychiatric/Behavioral: Negative.  Negative for dysphoric mood. The patient is not nervous/anxious.    All other systems reviewed and are negative.      Objective   Physical Exam   Pulmonary/Chest:           Our goal had been to taper her off of metoprolol with concerns that it was more likely to create hypotensive problems for her.  In addition amlodipine has the mortality benefit.  Reviewed hospital records and she did have a negative perfusion stress test in the hospital which in the [place] that she does not need a beta blocker for myocardial protection.  She may get a little bit of esophageal benefit from a calcium blocker.  I discussed the situation with patient and her daughter and we agreed that we would try to go back to the original plan of tapering off metoprolol and adding amlodipine.        Assessment/Plan   Allison was seen today for hypertension.    Diagnoses and all orders for this visit:    Essential hypertension    Syncope and collapse    Open fracture of one rib of left side, initial encounter    Other orders  -     amLODIPine (NORVASC) 2.5 MG tablet; One tablet every morning for blood pressure      Patient Instructions   Make an appointment to see Dr. Garrett in next month to recheck.     Begin taking metoprolol 25 mg tab--1/2 tab just in am for one week and then 1/2 every other morning for one week and then stop metoprolol    Start amlodipine 2.5 mg every morning when we start doing metoprolol every OTHER morning  UNLESS your BP starts to go up; in that case start immediately. Would want to start sooner, for example, if BP goes to 160/100 or you have symptoms like increased headaches.        Will review immunizations next visit.     EMR Dragon/Transcription disclaimer:   Much of this encounter note is an electronic transcription/translation of spoken language to printed text. The  electronic translation of spoken language may permit erroneous, or at times, nonsensical words or phrases to be inadvertently transcribed; Although I have reviewed the note for such errors, some may still exist. Please contact me with any questions or concerns about the conduct of this encounter note.

## 2018-06-26 ENCOUNTER — OFFICE VISIT (OUTPATIENT)
Dept: FAMILY MEDICINE CLINIC | Facility: CLINIC | Age: 83
End: 2018-06-26

## 2018-06-26 VITALS
WEIGHT: 141 LBS | BODY MASS INDEX: 24.98 KG/M2 | OXYGEN SATURATION: 96 % | SYSTOLIC BLOOD PRESSURE: 140 MMHG | DIASTOLIC BLOOD PRESSURE: 78 MMHG | HEIGHT: 63 IN | HEART RATE: 75 BPM | TEMPERATURE: 98.7 F | RESPIRATION RATE: 15 BRPM

## 2018-06-26 DIAGNOSIS — R09.89 ABNORMAL LUNG SOUNDS: ICD-10-CM

## 2018-06-26 DIAGNOSIS — R26.81 GAIT INSTABILITY: ICD-10-CM

## 2018-06-26 DIAGNOSIS — I10 ESSENTIAL HYPERTENSION: Primary | ICD-10-CM

## 2018-06-26 PROCEDURE — 99214 OFFICE O/P EST MOD 30 MIN: CPT | Performed by: FAMILY MEDICINE

## 2018-06-26 PROCEDURE — 71046 X-RAY EXAM CHEST 2 VIEWS: CPT | Performed by: FAMILY MEDICINE

## 2018-06-26 RX ORDER — AMLODIPINE BESYLATE 5 MG/1
TABLET ORAL
Qty: 90 TABLET | Refills: 1 | Status: SHIPPED | OUTPATIENT
Start: 2018-06-26 | End: 2018-12-30 | Stop reason: SDUPTHER

## 2018-06-26 NOTE — PATIENT INSTRUCTIONS
Increase amlodipine to 5 mg daily and keep track of BP--send me [employer] message with how pressure is running    Schedule 4 month Wellness Visit    I will send message on radiologist's interpretation of CXR (they can compare to old studies)

## 2018-06-26 NOTE — PROGRESS NOTES
Subjective   Allison Fitzpatrick is a 83 y.o. female.     Here to recheck blood pressure.  Has tapered off of metoprolol completely and is on amlodipine 2.5 mg.  She has had no more esophageal spasm and her headaches have gone away.  Having no difficulty with the medicine.  Blood pressures at home have been running mid-130s to 150 She does complain of significant problem with urge incontinence and nocturia.  This is not a new problem.  It does disturb her sleep.  She is concerned that she has some sensation loss in her fingers since she injured the finger when she put her hand in her pocket and didn't realize that she had.  Her daughter thinks she may have just torn a cuticle.  She did physical therapy at home for a while following her hospitalization recently.  They left her with a home exercise program which she does inconsistently.  She does continue to complain of some gait instability which she handles by touch support.  As not want to go back to physical therapy as an outpatient.  She's having no chest pain or shortness of breath or cough.         The following portions of the patient's history were reviewed and updated as appropriate: allergies, current medications, past family history, past medical history, past social history, past surgical history and problem list.    Review of Systems   Constitutional: Negative.  Negative for chills and fever.   HENT: Negative.  Negative for congestion, rhinorrhea and sore throat.    Eyes: Negative.  Negative for discharge and visual disturbance.   Respiratory: Negative.  Negative for cough and shortness of breath.    Cardiovascular: Negative.  Negative for chest pain.   Gastrointestinal: Negative.  Negative for abdominal pain, constipation, diarrhea, nausea and vomiting.   Endocrine: Negative.  Negative for polydipsia.   Genitourinary: Negative.  Negative for dysuria and hematuria.   Musculoskeletal: Positive for gait problem. Negative for arthralgias and myalgias.   Skin:  Negative.  Negative for rash.   Allergic/Immunologic: Negative.    Neurological: Positive for numbness. Negative for weakness and headaches.   Hematological: Negative.  Does not bruise/bleed easily.   Psychiatric/Behavioral: Negative.  Negative for dysphoric mood. The patient is not nervous/anxious.    All other systems reviewed and are negative.      Objective   Physical Exam   Constitutional: She is oriented to person, place, and time. She appears well-developed and well-nourished.   HENT:   Head: Normocephalic and atraumatic.   Right Ear: External ear normal.   Left Ear: External ear normal.   Mouth/Throat: No oropharyngeal exudate.   Eyes: Conjunctivae, EOM and lids are normal. Pupils are equal, round, and reactive to light. Right eye exhibits no discharge. Left eye exhibits no discharge. No scleral icterus.   Neck: Trachea normal, normal range of motion and phonation normal. Neck supple. No thyromegaly present.   Cardiovascular: Normal rate, regular rhythm and normal heart sounds.  Exam reveals no gallop and no friction rub.    No murmur heard.  Pulmonary/Chest: Effort normal. No stridor. She has wheezes in the right middle field. She has rales in the left lower field.   Abdominal: Soft. She exhibits no distension. There is no tenderness.   Musculoskeletal: Normal range of motion. She exhibits no edema.   Lymphadenopathy:     She has no cervical adenopathy.   Neurological: She is alert and oriented to person, place, and time. She has normal strength. No cranial nerve deficit or sensory deficit.   I really cannot detect objective loss of light touch in her hands or fingers   Skin: Skin is warm, dry and intact. No petechiae and no rash noted. No cyanosis. Nails show no clubbing.   Small area on her right middle finger dorsally that has some dried blood where it does look like she probably tore a cuticle.   Psychiatric: She has a normal mood and affect. Her speech is normal and behavior is normal. Judgment and  thought content normal. Cognition and memory are normal.   Nursing note and vitals reviewed.    CXR by my interpretation shows basilar atelectasis and elevated R diaphragm  Stressed doing exercises at home if she does not want PT  Discussed treatment for OAB and concerns about risk of medications; they want to hold on that  Doubt significant sensory problem in hands  I reviewed hospital labs-- showed significant improvement in azotemia after diuretic was discontinued    Assessment/Plan   Allison was seen today for hypertension and loss of consciousness.    Diagnoses and all orders for this visit:    Essential hypertension    Abnormal lung sounds  -     XR Chest PA & Lateral    Gait instability    Other orders  -     amLODIPine (NORVASC) 5 MG tablet; One tab po Qday for blood pressure NOTE DOSE CHANGE      Patient Instructions   Increase amlodipine to 5 mg daily and keep track of BP--send me Open Me message with how pressure is running    Schedule 4 month Wellness Visit    I will send message on radiologist's interpretation of CXR (they can compare to old studies)          EMR Dragon/Transcription disclaimer:   Much of this encounter note is an electronic transcription/translation of spoken language to printed text. The electronic translation of spoken language may permit erroneous, or at times, nonsensical words or phrases to be inadvertently transcribed; Although I have reviewed the note for such errors, some may still exist. Please contact me with any questions or concerns about the conduct of this encounter note.

## 2018-10-08 ENCOUNTER — OFFICE VISIT (OUTPATIENT)
Dept: FAMILY MEDICINE CLINIC | Facility: CLINIC | Age: 83
End: 2018-10-08

## 2018-10-08 VITALS
DIASTOLIC BLOOD PRESSURE: 66 MMHG | RESPIRATION RATE: 18 BRPM | TEMPERATURE: 98.1 F | HEART RATE: 61 BPM | HEIGHT: 63 IN | OXYGEN SATURATION: 93 % | BODY MASS INDEX: 26.03 KG/M2 | WEIGHT: 146.9 LBS | SYSTOLIC BLOOD PRESSURE: 126 MMHG

## 2018-10-08 DIAGNOSIS — R60.9 PERIPHERAL EDEMA: ICD-10-CM

## 2018-10-08 DIAGNOSIS — I50.9 ACUTE CONGESTIVE HEART FAILURE, UNSPECIFIED HEART FAILURE TYPE (HCC): Primary | ICD-10-CM

## 2018-10-08 PROCEDURE — 71046 X-RAY EXAM CHEST 2 VIEWS: CPT | Performed by: FAMILY MEDICINE

## 2018-10-08 PROCEDURE — 99214 OFFICE O/P EST MOD 30 MIN: CPT | Performed by: FAMILY MEDICINE

## 2018-10-08 PROCEDURE — 93000 ELECTROCARDIOGRAM COMPLETE: CPT | Performed by: FAMILY MEDICINE

## 2018-10-08 RX ORDER — HYDROCHLOROTHIAZIDE 12.5 MG/1
TABLET ORAL
Qty: 30 TABLET | Refills: 0 | Status: SHIPPED | OUTPATIENT
Start: 2018-10-08 | End: 2018-10-23 | Stop reason: SDUPTHER

## 2018-10-08 NOTE — PROGRESS NOTES
Procedure     ECG 12 Lead  Date/Time: 10/8/2018 5:47 PM  Performed by: JERRY SOLO  Authorized by: JERRY SOLO   Interpreted by ED physician  Comparison: compared with previous ECG from 3/28/2018  Similar to previous ECG  Rhythm: sinus rhythm  Rate: normal  BPM: 74  Conduction: conduction normal  ST Segments: ST segments normal  QRS axis: normal  Other: no other findings  Clinical impression: non-specific ECG

## 2018-10-08 NOTE — PATIENT INSTRUCTIONS
One dose of diuretic when you get it today and then starting in am, one tab every am    ER if problems with breathing, bad swelling, chest pain, etc    See me in one week    We will send you lab reports by [facility]

## 2018-10-08 NOTE — PROGRESS NOTES
Subjective   Allison Fitzpatrick is a 83 y.o. female.     Complains of swelling.  Come me by daughter who provides a portion of the history.  Last couple weeks has had swelling in the ankles and lower legs.  Daughter has noticed increased work of breathing with walking and bending over to fix her shoes.  Patient denies chest pain.  She really doesn't volunteer any history of dyspnea.  She is able to lie down okay.  She has no history of cardiac problems other than a rhythm disturbance in the past.  She has however in the last several months been taken off of both the beta blocker and a diuretic.  Her blood pressure has been well controlled with amlodipine.  No other med changes.  She's not been ill recently.           The following portions of the patient's history were reviewed and updated as appropriate: allergies, current medications, past family history, past medical history, past social history, past surgical history and problem list.    Review of Systems   Constitutional: Negative for chills and fever.   HENT: Negative for congestion, rhinorrhea and sore throat.    Eyes: Negative for discharge and visual disturbance.   Respiratory: Positive for shortness of breath. Negative for cough.    Cardiovascular: Positive for leg swelling. Negative for chest pain and palpitations.   Gastrointestinal: Negative for abdominal pain, constipation, diarrhea, nausea and vomiting.   Endocrine: Negative for polydipsia.   Genitourinary: Negative for dysuria and hematuria.   Musculoskeletal: Negative for arthralgias and myalgias.   Skin: Negative for rash.   Allergic/Immunologic: Negative.    Neurological: Negative for weakness, numbness and headaches.   Hematological: Does not bruise/bleed easily.   Psychiatric/Behavioral: Negative for dysphoric mood. The patient is not nervous/anxious.    All other systems reviewed and are negative.      Objective   Physical Exam   Constitutional: She is oriented to person, place, and time. She  appears well-developed and well-nourished.   HENT:   Head: Normocephalic and atraumatic.   Right Ear: External ear normal.   Left Ear: External ear normal.   Mouth/Throat: No oropharyngeal exudate.   Eyes: Pupils are equal, round, and reactive to light. Conjunctivae, EOM and lids are normal. Right eye exhibits no discharge. Left eye exhibits no discharge. No scleral icterus.   Neck: Trachea normal, normal range of motion and phonation normal. Neck supple. No thyromegaly present.   Cardiovascular: Normal rate and regular rhythm.  Exam reveals gallop and S3. Exam reveals no friction rub.    No murmur heard.  Has 1+ pitting distal pretibial and ankle edema bilaterally.   Pulmonary/Chest: Effort normal and breath sounds normal. No stridor. She has no wheezes. She has no rales.   Abdominal: Soft. She exhibits no distension. There is no tenderness.   Musculoskeletal: Normal range of motion. She exhibits no edema.   Lymphadenopathy:     She has no cervical adenopathy.   Neurological: She is alert and oriented to person, place, and time. She has normal strength. No cranial nerve deficit.   Skin: Skin is warm, dry and intact. No petechiae and no rash noted. No cyanosis. Nails show no clubbing.   Psychiatric: She has a normal mood and affect. Her speech is normal and behavior is normal. Judgment and thought content normal. Cognition and memory are normal.   Nursing note and vitals reviewed.    EKG:  By me--NSR @ 74 ; nonspecific EKG  CXR:  By me--elevated right hemidiaphragm with right infrahilar atelectasis vs scar; suspect some mild cephalization of flow    Labs pending  Cautions    I do suspect she has some cardiac decompensation.  Will add gentle diuretic and labs.      Assessment/Plan   Allison was seen today for edema.    Diagnoses and all orders for this visit:    Acute congestive heart failure, unspecified heart failure type (CMS/HCC)  -     ECG 12 Lead  -     XR Chest PA & Lateral  -     hydrochlorothiazide  (HYDRODIURIL) 12.5 MG tablet; One tab po  -     CBC & Differential  -     Comprehensive Metabolic Panel  -     Troponin I  -     BNP  -     TSH Rfx On Abnormal To Free T4    Peripheral edema  -     hydrochlorothiazide (HYDRODIURIL) 12.5 MG tablet; One tab po  -     CBC & Differential  -     Comprehensive Metabolic Panel  -     Troponin I  -     BNP  -     TSH Rfx On Abnormal To Free T4      Patient Instructions   One dose of diuretic when you get it today and then starting in am, one tab every am    ER if problems with breathing, bad swelling, chest pain, etc    See me in one week    We will send you lab reports by [employer] Dragon/Transcription disclaimer:   Much of this encounter note is an electronic transcription/translation of spoken language to printed text. The electronic translation of spoken language may permit erroneous, or at times, nonsensical words or phrases to be inadvertently transcribed; Although I have reviewed the note for such errors, some may still exist. Please contact me with any questions or concerns about the conduct of this encounter note.

## 2018-10-09 LAB
ALBUMIN SERPL-MCNC: 4.4 G/DL (ref 3.5–5.2)
ALBUMIN/GLOB SERPL: 1.5 G/DL
ALP SERPL-CCNC: 66 U/L (ref 39–117)
ALT SERPL-CCNC: 13 U/L (ref 1–33)
AST SERPL-CCNC: 15 U/L (ref 1–32)
BASOPHILS # BLD AUTO: 0.03 10*3/MM3 (ref 0–0.2)
BASOPHILS NFR BLD AUTO: 0.4 % (ref 0–1.5)
BILIRUB SERPL-MCNC: 0.2 MG/DL (ref 0.1–1.2)
BNP SERPL-MCNC: 54.5 PG/ML (ref 0–100)
BUN SERPL-MCNC: 33 MG/DL (ref 8–23)
BUN/CREAT SERPL: 21.9 (ref 7–25)
CALCIUM SERPL-MCNC: 9.8 MG/DL (ref 8.6–10.5)
CHLORIDE SERPL-SCNC: 104 MMOL/L (ref 98–107)
CO2 SERPL-SCNC: 23.3 MMOL/L (ref 22–29)
CREAT SERPL-MCNC: 1.51 MG/DL (ref 0.57–1)
EOSINOPHIL # BLD AUTO: 0.34 10*3/MM3 (ref 0–0.7)
EOSINOPHIL NFR BLD AUTO: 4.1 % (ref 0.3–6.2)
ERYTHROCYTE [DISTWIDTH] IN BLOOD BY AUTOMATED COUNT: 14.3 % (ref 11.7–13)
GLOBULIN SER CALC-MCNC: 3 GM/DL
GLUCOSE SERPL-MCNC: 113 MG/DL (ref 65–99)
HCT VFR BLD AUTO: 38.1 % (ref 35.6–45.5)
HGB BLD-MCNC: 11.7 G/DL (ref 11.9–15.5)
IMM GRANULOCYTES # BLD: 0.02 10*3/MM3 (ref 0–0.03)
IMM GRANULOCYTES NFR BLD: 0.2 % (ref 0–0.5)
LYMPHOCYTES # BLD AUTO: 2.5 10*3/MM3 (ref 0.9–4.8)
LYMPHOCYTES NFR BLD AUTO: 30.2 % (ref 19.6–45.3)
MCH RBC QN AUTO: 28.7 PG (ref 26.9–32)
MCHC RBC AUTO-ENTMCNC: 30.7 G/DL (ref 32.4–36.3)
MCV RBC AUTO: 93.4 FL (ref 80.5–98.2)
MONOCYTES # BLD AUTO: 0.86 10*3/MM3 (ref 0.2–1.2)
MONOCYTES NFR BLD AUTO: 10.4 % (ref 5–12)
NEUTROPHILS # BLD AUTO: 4.53 10*3/MM3 (ref 1.9–8.1)
NEUTROPHILS NFR BLD AUTO: 54.7 % (ref 42.7–76)
PLATELET # BLD AUTO: 211 10*3/MM3 (ref 140–500)
POTASSIUM SERPL-SCNC: 4.2 MMOL/L (ref 3.5–5.2)
PROT SERPL-MCNC: 7.4 G/DL (ref 6–8.5)
RBC # BLD AUTO: 4.08 10*6/MM3 (ref 3.9–5.2)
SODIUM SERPL-SCNC: 143 MMOL/L (ref 136–145)
TROPONIN I SERPL-MCNC: <0.01 NG/ML (ref 0–0.04)
TSH SERPL DL<=0.005 MIU/L-ACNC: 1.78 MIU/ML (ref 0.27–4.2)
WBC # BLD AUTO: 8.28 10*3/MM3 (ref 4.5–10.7)

## 2018-10-23 ENCOUNTER — OFFICE VISIT (OUTPATIENT)
Dept: FAMILY MEDICINE CLINIC | Facility: CLINIC | Age: 83
End: 2018-10-23

## 2018-10-23 VITALS
SYSTOLIC BLOOD PRESSURE: 126 MMHG | WEIGHT: 144.7 LBS | RESPIRATION RATE: 18 BRPM | HEIGHT: 63 IN | BODY MASS INDEX: 25.64 KG/M2 | DIASTOLIC BLOOD PRESSURE: 70 MMHG | OXYGEN SATURATION: 92 % | HEART RATE: 80 BPM | TEMPERATURE: 98.7 F

## 2018-10-23 DIAGNOSIS — I50.9 ACUTE CONGESTIVE HEART FAILURE, UNSPECIFIED HEART FAILURE TYPE (HCC): ICD-10-CM

## 2018-10-23 DIAGNOSIS — R60.9 PERIPHERAL EDEMA: ICD-10-CM

## 2018-10-23 DIAGNOSIS — I10 ESSENTIAL HYPERTENSION: Primary | ICD-10-CM

## 2018-10-23 DIAGNOSIS — N28.9 RENAL INSUFFICIENCY: ICD-10-CM

## 2018-10-23 PROCEDURE — 99213 OFFICE O/P EST LOW 20 MIN: CPT | Performed by: FAMILY MEDICINE

## 2018-10-23 RX ORDER — HYDROCHLOROTHIAZIDE 12.5 MG/1
TABLET ORAL
Start: 2018-10-23 | End: 2019-02-12

## 2018-10-23 NOTE — PATIENT INSTRUCTIONS
Stop diuretic (fluid pill-hydrochlorothiazide/HCTZ)-may use it AS NEEDED if swelling returns    We will let you know about blood work from today    Stay on amlodipine for blood pressure and continue other meds as usual    Call for first available new patient appointment in new year with Dr Velazquez

## 2018-10-23 NOTE — PROGRESS NOTES
Subjective   Allison Fitzpatrick is a 83 y.o. female.     Follow-up of peripheral edema.  She was seen here on October 8 with significant peripheral edema and complaining of low energy.  At that time her chest x-ray showed no acute prosthetics and she had stable renal function with a normal BNP and thyroid studies.  She was treated with 12 mg of hydrochlorothiazide and has had significant decrease in her edema.  She still complains of low energy.  She says her energy goes down after she takes her pills in the morning.  Other acute complaints.  She's planning on following up with Dr. Velazquez after I leave.           The following portions of the patient's history were reviewed and updated as appropriate: allergies, current medications, past family history, past medical history, past social history, past surgical history and problem list.    Review of Systems   Constitutional: Positive for fatigue. Negative for chills and fever.   HENT: Negative for congestion, rhinorrhea and sore throat.    Eyes: Negative for discharge and visual disturbance.   Respiratory: Negative for cough and shortness of breath.    Cardiovascular: Negative for chest pain (improved significantly) and leg swelling.   Gastrointestinal: Negative for abdominal pain, constipation, diarrhea, nausea and vomiting.   Endocrine: Negative for polydipsia.   Genitourinary: Negative for dysuria and hematuria.   Musculoskeletal: Negative for arthralgias and myalgias.   Skin: Negative for rash.   Allergic/Immunologic: Negative.    Neurological: Negative for weakness, numbness and headaches.   Hematological: Does not bruise/bleed easily.   Psychiatric/Behavioral: Negative for dysphoric mood. The patient is not nervous/anxious.    All other systems reviewed and are negative.      Objective   Physical Exam   Constitutional: She is oriented to person, place, and time. She appears well-developed and well-nourished.   HENT:   Head: Normocephalic and atraumatic.   Right  Ear: External ear normal.   Left Ear: External ear normal.   Mouth/Throat: No oropharyngeal exudate.   Eyes: Pupils are equal, round, and reactive to light. Conjunctivae, EOM and lids are normal. Right eye exhibits no discharge. Left eye exhibits no discharge. No scleral icterus.   Neck: Trachea normal, normal range of motion and phonation normal. Neck supple. No thyromegaly present.   Cardiovascular: Normal rate, regular rhythm and normal heart sounds.  Exam reveals no gallop and no friction rub.    No murmur heard.  Edema has improved nicely.   Pulmonary/Chest: Effort normal and breath sounds normal. No stridor. She has no wheezes. She has no rales.   Abdominal: She exhibits no distension.   Musculoskeletal: Normal range of motion. She exhibits no edema.   Lymphadenopathy:     She has no cervical adenopathy.   Neurological: She is alert and oriented to person, place, and time. She has normal strength. No cranial nerve deficit.   Skin: Skin is warm, dry and intact. No petechiae and no rash noted. No cyanosis. Nails show no clubbing.   Psychiatric: She has a normal mood and affect. Her speech is normal and behavior is normal. Judgment and thought content normal. Cognition and memory are normal.   Nursing note and vitals reviewed.      Assessment/Plan   Allison was seen today for joint swelling.    Diagnoses and all orders for this visit:    Essential hypertension  -     Basic Metabolic Panel  -     Magnesium    Peripheral edema  -     Basic Metabolic Panel  -     Magnesium  -     hydrochlorothiazide (HYDRODIURIL) 12.5 MG tablet; One tab po daily prn swelling    Renal insufficiency  -     Basic Metabolic Panel  -     Magnesium    Acute congestive heart failure, unspecified heart failure type (CMS/HCC)  -     hydrochlorothiazide (HYDRODIURIL) 12.5 MG tablet; One tab po daily prn swelling      Patient Instructions   Stop diuretic (fluid pill-hydrochlorothiazide/HCTZ)-may use it AS NEEDED if swelling returns    We will  let you know about blood work from today    Stay on amlodipine for blood pressure and continue other meds as usual    Call for first available new patient appointment in new year with Dr Caroline Childs/Transcription disclaimer:   Much of this encounter note is an electronic transcription/translation of spoken language to printed text. The electronic translation of spoken language may permit erroneous, or at times, nonsensical words or phrases to be inadvertently transcribed; Although I have reviewed the note for such errors, some may still exist. Please contact me with any questions or concerns about the conduct of this encounter note.

## 2018-10-24 LAB
BUN SERPL-MCNC: 40 MG/DL (ref 8–23)
BUN/CREAT SERPL: 27.2 (ref 7–25)
CALCIUM SERPL-MCNC: 10.1 MG/DL (ref 8.6–10.5)
CHLORIDE SERPL-SCNC: 99 MMOL/L (ref 98–107)
CO2 SERPL-SCNC: 28.6 MMOL/L (ref 22–29)
CREAT SERPL-MCNC: 1.47 MG/DL (ref 0.57–1)
GLUCOSE SERPL-MCNC: 106 MG/DL (ref 65–99)
MAGNESIUM SERPL-MCNC: 2.4 MG/DL (ref 1.6–2.4)
POTASSIUM SERPL-SCNC: 3.8 MMOL/L (ref 3.5–5.2)
SODIUM SERPL-SCNC: 145 MMOL/L (ref 136–145)

## 2018-12-31 RX ORDER — AMLODIPINE BESYLATE 5 MG/1
TABLET ORAL
Qty: 90 TABLET | Refills: 0 | Status: SHIPPED | OUTPATIENT
Start: 2018-12-31 | End: 2019-02-12

## 2019-02-12 ENCOUNTER — OFFICE VISIT (OUTPATIENT)
Dept: FAMILY MEDICINE CLINIC | Facility: CLINIC | Age: 84
End: 2019-02-12

## 2019-02-12 VITALS
HEIGHT: 63 IN | WEIGHT: 146.3 LBS | SYSTOLIC BLOOD PRESSURE: 106 MMHG | TEMPERATURE: 98.3 F | OXYGEN SATURATION: 99 % | DIASTOLIC BLOOD PRESSURE: 60 MMHG | HEART RATE: 86 BPM | BODY MASS INDEX: 25.92 KG/M2

## 2019-02-12 DIAGNOSIS — N18.30 STAGE 3 CHRONIC KIDNEY DISEASE (HCC): ICD-10-CM

## 2019-02-12 DIAGNOSIS — I10 ESSENTIAL HYPERTENSION: Primary | ICD-10-CM

## 2019-02-12 DIAGNOSIS — E78.5 HYPERLIPIDEMIA, UNSPECIFIED HYPERLIPIDEMIA TYPE: ICD-10-CM

## 2019-02-12 DIAGNOSIS — E55.9 HYPOVITAMINOSIS D: ICD-10-CM

## 2019-02-12 DIAGNOSIS — Z23 NEED FOR PNEUMOCOCCAL VACCINE: ICD-10-CM

## 2019-02-12 DIAGNOSIS — R26.81 GAIT INSTABILITY: ICD-10-CM

## 2019-02-12 PROBLEM — S22.32XA FRACTURE OF ONE RIB OF LEFT SIDE: Status: RESOLVED | Noted: 2018-03-28 | Resolved: 2019-02-12

## 2019-02-12 PROBLEM — R55 SYNCOPE AND COLLAPSE: Status: RESOLVED | Noted: 2018-03-28 | Resolved: 2019-02-12

## 2019-02-12 PROCEDURE — 99214 OFFICE O/P EST MOD 30 MIN: CPT | Performed by: FAMILY MEDICINE

## 2019-02-12 PROCEDURE — 90732 PPSV23 VACC 2 YRS+ SUBQ/IM: CPT | Performed by: FAMILY MEDICINE

## 2019-02-12 PROCEDURE — G0009 ADMIN PNEUMOCOCCAL VACCINE: HCPCS | Performed by: FAMILY MEDICINE

## 2019-02-12 NOTE — PROGRESS NOTES
Allison Fitzpatrick is a 84 y.o. female.     Chief Complaint   Patient presents with   • Establish Care     new pt establishing today with dr koch   • Hypertension     no complains   • Chronic Kidney Disease       HPI     Pt is a pleasant 84 y.o. YO female here for HTN and CKD.  New patient to me and this office.  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    HTN: BP lower than recommendations, asymptomatic, no chest pain, palpitations  or shortness of breath.  She did fall in March with a rib fracture and subsequent arm pain on the left.  She has healed well, no falls since then.      Chronic kidney disease gradually worsening.  Her creatinine has worsened from 0.8 up to 1.4 over the last 8 months.  She does not drink adequate water, often half a glass a day.  Does produce urine.     The following portions of the patient's history were reviewed and updated as appropriate: allergies, current medications, past family history, past medical history, past social history, past surgical history and problem list.    Review of Systems   Constitutional: Positive for fatigue.   HENT: Negative.    Eyes: Negative.    Cardiovascular: Negative.    Gastrointestinal: Negative.         Gerd   Endocrine: Negative.    Genitourinary: Negative.    Musculoskeletal: Positive for arthralgias and myalgias.   Allergic/Immunologic: Negative.    Neurological: Positive for light-headedness.   Hematological: Negative.    Psychiatric/Behavioral: Negative.        Objective  Vitals:    02/12/19 1331   BP: 106/60   Pulse: 86   Temp: 98.3 °F (36.8 °C)   SpO2: 99%        Physical Exam   Constitutional: She is oriented to person, place, and time. She appears well-developed and well-nourished. No distress.   HENT:   Head: Normocephalic.   Nose: Nose normal.   Eyes: EOM are normal.   Cardiovascular: Normal rate, regular rhythm, normal heart sounds  and intact distal pulses.   No murmur heard.  Pulmonary/Chest: Effort normal and breath sounds normal. No respiratory distress.   Musculoskeletal: Normal range of motion.   Neurological: She is alert and oriented to person, place, and time.   Skin: Skin is warm and dry. No rash noted.   Psychiatric: She has a normal mood and affect. Her behavior is normal. Judgment and thought content normal.   Nursing note and vitals reviewed.        Current Outpatient Medications:   •  aspirin  MG EC tablet, Take 1 tablet by mouth Daily., Disp: , Rfl:   •  vitamin B-12 (CYANOCOBALAMIN) 1000 MCG tablet, Take 1,000 mcg by mouth Daily., Disp: , Rfl:     Procedures    Lab Results (most recent)     None              Allison was seen today for establish care, hypertension and chronic kidney disease.    Diagnoses and all orders for this visit:    Essential hypertension  -     Comprehensive Metabolic Panel    Hyperlipidemia, unspecified hyperlipidemia type  -     Lipid Panel    Gait instability  -     Comprehensive Metabolic Panel    Hypovitaminosis D  -     Vitamin D 25 Hydroxy    Need for pneumococcal vaccine  -     Pneumococcal polysaccharide vaccine 23-valent >= 1yo subcutaneous/IM (PPSV23)    Stage 3 chronic kidney disease (CMS/HCC)  -     Comprehensive Metabolic Panel      Blood pressure well controlled, however with her history of fall in March likely too low at 100/60.  Hold amlodipine with goal blood pressure less than 150/90.  Will check at home, if going above this range will restart Amlodipine and notify me.     Gait instability, check vitamin D level to reduce risk of fall.  Walker recommended.  Pt declined PT.     Chronic kidney disease that has been overall worsening over the last 8 months.  Recheck creatinine today.    Due for pneumonia vaccine today.    Advised patient to increase water intake to 2 glasses a day, currently drinking half a glass of water a day.    Return in about 6 months (around 8/12/2019), or if  symptoms worsen or fail to improve, for Recheck HTN .      Haleigh Velazquez MD

## 2019-02-13 DIAGNOSIS — E87.5 HYPERKALEMIA: Primary | ICD-10-CM

## 2019-02-13 LAB
25(OH)D3+25(OH)D2 SERPL-MCNC: 27 NG/ML (ref 30–100)
ALBUMIN SERPL-MCNC: 4.6 G/DL (ref 3.5–4.7)
ALBUMIN/GLOB SERPL: 1.7 {RATIO} (ref 1.2–2.2)
ALP SERPL-CCNC: 61 IU/L (ref 39–117)
ALT SERPL-CCNC: 13 IU/L (ref 0–32)
AST SERPL-CCNC: 18 IU/L (ref 0–40)
BILIRUB SERPL-MCNC: 0.3 MG/DL (ref 0–1.2)
BUN SERPL-MCNC: 29 MG/DL (ref 8–27)
BUN/CREAT SERPL: 21 (ref 12–28)
CALCIUM SERPL-MCNC: 9.8 MG/DL (ref 8.7–10.3)
CHLORIDE SERPL-SCNC: 107 MMOL/L (ref 96–106)
CHOLEST SERPL-MCNC: 204 MG/DL (ref 100–199)
CO2 SERPL-SCNC: 24 MMOL/L (ref 20–29)
CREAT SERPL-MCNC: 1.39 MG/DL (ref 0.57–1)
GLOBULIN SER CALC-MCNC: 2.7 G/DL (ref 1.5–4.5)
GLUCOSE SERPL-MCNC: 107 MG/DL (ref 65–99)
HDLC SERPL-MCNC: 42 MG/DL
LDLC SERPL CALC-MCNC: 115 MG/DL (ref 0–99)
POTASSIUM SERPL-SCNC: 5.3 MMOL/L (ref 3.5–5.2)
PROT SERPL-MCNC: 7.3 G/DL (ref 6–8.5)
SODIUM SERPL-SCNC: 148 MMOL/L (ref 134–144)
TRIGL SERPL-MCNC: 235 MG/DL (ref 0–149)
VLDLC SERPL CALC-MCNC: 47 MG/DL (ref 5–40)

## 2019-02-13 NOTE — PROGRESS NOTES
Please call patient back with results.  The labs has resulted as low vitamin D, this could increase her risk for fall.  I recommend taking 6000 units a day.  We can recheck this level in 3 months.  She does not need to see me but can do a lab appointment.  Additionally, her potassium is elevated.  I recommend that she increase her water intake as we discussed in the office and recheck this lab in 2 weeks.  If potassium continues to be elevated, this could cause a rhythm issue for the heart.  Otherwise labs are stable - cholesterol is slightly elevated but no needing medication. FU for BMP in 2 weeks, thanks!    Thank you

## 2019-02-27 ENCOUNTER — APPOINTMENT (OUTPATIENT)
Dept: GENERAL RADIOLOGY | Facility: HOSPITAL | Age: 84
End: 2019-02-27

## 2019-02-27 ENCOUNTER — HOSPITAL ENCOUNTER (EMERGENCY)
Facility: HOSPITAL | Age: 84
Discharge: HOME | End: 2019-02-28
Attending: EMERGENCY MEDICINE | Admitting: EMERGENCY MEDICINE

## 2019-02-27 DIAGNOSIS — M25.552 LEFT HIP PAIN: Primary | ICD-10-CM

## 2019-02-27 PROCEDURE — 99283 EMERGENCY DEPT VISIT LOW MDM: CPT

## 2019-02-27 PROCEDURE — 73502 X-RAY EXAM HIP UNI 2-3 VIEWS: CPT

## 2019-02-27 PROCEDURE — 72110 X-RAY EXAM L-2 SPINE 4/>VWS: CPT

## 2019-02-27 RX ORDER — SENNOSIDES 8.6 MG
650 CAPSULE ORAL EVERY 8 HOURS PRN
COMMUNITY

## 2019-02-27 RX ORDER — HYDROCODONE BITARTRATE AND ACETAMINOPHEN 5; 325 MG/1; MG/1
1 TABLET ORAL ONCE
Status: COMPLETED | OUTPATIENT
Start: 2019-02-27 | End: 2019-02-27

## 2019-02-27 RX ADMIN — HYDROCODONE BITARTRATE AND ACETAMINOPHEN 1 TABLET: 5; 325 TABLET ORAL at 23:07

## 2019-02-28 ENCOUNTER — PATIENT OUTREACH (OUTPATIENT)
Dept: CASE MANAGEMENT | Facility: OTHER | Age: 84
End: 2019-02-28

## 2019-02-28 VITALS
HEIGHT: 64 IN | BODY MASS INDEX: 25.11 KG/M2 | OXYGEN SATURATION: 100 % | DIASTOLIC BLOOD PRESSURE: 87 MMHG | SYSTOLIC BLOOD PRESSURE: 178 MMHG | TEMPERATURE: 98.4 F | HEART RATE: 76 BPM | RESPIRATION RATE: 19 BRPM

## 2019-02-28 RX ORDER — PREDNISONE 20 MG/1
40 TABLET ORAL DAILY
Qty: 10 TABLET | Refills: 0 | Status: SHIPPED | OUTPATIENT
Start: 2019-02-28 | End: 2019-03-05

## 2019-02-28 RX ORDER — HYDROCODONE BITARTRATE AND ACETAMINOPHEN 5; 325 MG/1; MG/1
1 TABLET ORAL EVERY 6 HOURS PRN
Qty: 12 TABLET | Refills: 0 | Status: SHIPPED | OUTPATIENT
Start: 2019-02-28 | End: 2019-03-05

## 2019-03-05 ENCOUNTER — OFFICE VISIT (OUTPATIENT)
Dept: FAMILY MEDICINE CLINIC | Facility: CLINIC | Age: 84
End: 2019-03-05

## 2019-03-05 VITALS
WEIGHT: 140 LBS | HEIGHT: 63 IN | TEMPERATURE: 98.2 F | DIASTOLIC BLOOD PRESSURE: 72 MMHG | OXYGEN SATURATION: 97 % | HEART RATE: 74 BPM | BODY MASS INDEX: 24.8 KG/M2 | SYSTOLIC BLOOD PRESSURE: 118 MMHG

## 2019-03-05 DIAGNOSIS — M54.16 LUMBAR RADICULOPATHY: Primary | ICD-10-CM

## 2019-03-05 LAB
BUN SERPL-MCNC: 39 MG/DL (ref 8–23)
BUN/CREAT SERPL: 30 (ref 7–25)
CALCIUM SERPL-MCNC: 9.9 MG/DL (ref 8.6–10.5)
CHLORIDE SERPL-SCNC: 104 MMOL/L (ref 98–107)
CO2 SERPL-SCNC: 23 MMOL/L (ref 22–29)
CREAT SERPL-MCNC: 1.3 MG/DL (ref 0.57–1)
GLUCOSE SERPL-MCNC: 158 MG/DL (ref 65–99)
POTASSIUM SERPL-SCNC: 3.8 MMOL/L (ref 3.5–5.2)
SODIUM SERPL-SCNC: 144 MMOL/L (ref 136–145)

## 2019-03-05 PROCEDURE — 99214 OFFICE O/P EST MOD 30 MIN: CPT | Performed by: FAMILY MEDICINE

## 2019-03-05 PROCEDURE — 96372 THER/PROPH/DIAG INJ SC/IM: CPT | Performed by: FAMILY MEDICINE

## 2019-03-05 RX ORDER — METHYLPREDNISOLONE SODIUM SUCCINATE 40 MG/ML
40 INJECTION, POWDER, LYOPHILIZED, FOR SOLUTION INTRAMUSCULAR; INTRAVENOUS ONCE
Status: COMPLETED | OUTPATIENT
Start: 2019-03-05 | End: 2019-03-05

## 2019-03-05 RX ORDER — CYCLOBENZAPRINE HCL 5 MG
5 TABLET ORAL 3 TIMES DAILY PRN
Qty: 60 TABLET | Refills: 3 | Status: SHIPPED | OUTPATIENT
Start: 2019-03-05 | End: 2019-10-02

## 2019-03-05 RX ADMIN — METHYLPREDNISOLONE SODIUM SUCCINATE 40 MG: 40 INJECTION, POWDER, LYOPHILIZED, FOR SOLUTION INTRAMUSCULAR; INTRAVENOUS at 10:22

## 2019-03-05 NOTE — PROGRESS NOTES
Allison Fitzpatrick is a 84 y.o. female.     Chief Complaint   Patient presents with   • Hip Pain     lower hip pain ,left hip pain  when she stands and moves the left leg aroun when to er they gave norco and prednisone       HPI     Pt is a pleasant 84 y.o. YO female here for Hip Pain.  New problem to me.   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    Started 2/27/19.  She was at home organizing a cabinet, sitting on the floor.  When moving to a standing position she strained her back and experienced severe back pain with radiation down the left leg.  She went to the emergency room, records reviewed.  X-ray performed showing no signs of fracture or dislocation, exam reassuring, she was prescribed hydrocodone and prednisone 40 mg for 5 days.  She has not experience significant relief, pain that is worst with lower back with leaning forward, radiates down the L leg.  Does not hurts when standing or sitting and leaning back.  He did have some confusion with hydrocodone.    The following portions of the patient's history were reviewed and updated as appropriate: allergies, current medications, past family history, past medical history, past social history, past surgical history and problem list.    Review of Systems   HENT: Positive for hearing loss.    Eyes: Negative.    Cardiovascular: Negative for chest pain, palpitations and leg swelling.   Gastrointestinal: Negative.    Endocrine: Negative.    Genitourinary: Negative.    Musculoskeletal: Positive for arthralgias, back pain and myalgias.   Allergic/Immunologic: Negative.    Neurological: Negative.    Psychiatric/Behavioral: Negative.        Objective  Vitals:    03/05/19 0940   BP: 118/72   Pulse: 74   Temp: 98.2 °F (36.8 °C)   SpO2: 97%        Physical Exam   Constitutional: She is oriented to person, place, and time. She appears well-developed and  well-nourished. No distress.   HENT:   Head: Normocephalic.   Nose: Nose normal.   Eyes: EOM are normal.   Cardiovascular: Normal rate, regular rhythm, normal heart sounds and intact distal pulses.   No murmur heard.  Pulmonary/Chest: Effort normal and breath sounds normal. No respiratory distress.   Musculoskeletal: Normal range of motion.   No tenderness along the spinal process, minimal tenderness along the paraspinal muscles left side, right side no tenderness.  Severe tenderness along the buttocks around the SI joint.  Negative leg raise bilaterally, significant flexibility.   Neurological: She is alert and oriented to person, place, and time.   Skin: Skin is warm and dry. No rash noted.   Psychiatric: She has a normal mood and affect. Her behavior is normal. Judgment and thought content normal.   Nursing note and vitals reviewed.        Current Outpatient Medications:   •  acetaminophen (TYLENOL) 650 MG 8 hr tablet, Take 650 mg by mouth Every 8 (Eight) Hours As Needed for Mild Pain ., Disp: , Rfl:   •  aspirin  MG EC tablet, Take 1 tablet by mouth Daily., Disp: , Rfl:   •  vitamin B-12 (CYANOCOBALAMIN) 1000 MCG tablet, Take 1,000 mcg by mouth Daily., Disp: , Rfl:   •  cyclobenzaprine (FLEXERIL) 5 MG tablet, Take 1 tablet by mouth 3 (Three) Times a Day As Needed for Muscle Spasms., Disp: 60 tablet, Rfl: 3    Current Facility-Administered Medications:   •  methylPREDNISolone sodium succinate (SOLU-Medrol) injection 40 mg, 40 mg, Intramuscular, Once, Haleigh Velazquez MD    Procedures    Lab Results (most recent)     None              Allison was seen today for hip pain.    Diagnoses and all orders for this visit:    Lumbar radiculopathy  -     cyclobenzaprine (FLEXERIL) 5 MG tablet; Take 1 tablet by mouth 3 (Three) Times a Day As Needed for Muscle Spasms.  -     methylPREDNISolone sodium succinate (SOLU-Medrol) injection 40 mg; Inject 1 mL into the appropriate muscle as directed by prescriber 1 (One)  Time.      Lumbar radiculopathy with radiation down the left leg.  Not improving, records reviewed from from ER, x-ray negative for fracture dislocation.  Pain worst with sitting forward, possible spinal stenosis, but patient not a surgical candidate.  Treat with Flexeril,'s prescription given, warnings for confusion given to granddaughter.  If not improving return for sooner follow-up.  Prednisone injection given closer to the site of irritation, we will not continue with oral prednisone as she has completed one course already.  However okay to call for prescription if it seems that the steroid injection today helped.    Hyperkalemia, labs already ordered, get BMP today.  Patient is avoiding foods high in potassium currently.    Return if symptoms worsen or fail to improve.      Haleigh Velazquez MD

## 2019-03-06 ENCOUNTER — PATIENT OUTREACH (OUTPATIENT)
Dept: CASE MANAGEMENT | Facility: OTHER | Age: 84
End: 2019-03-06

## 2019-03-19 ENCOUNTER — PATIENT OUTREACH (OUTPATIENT)
Dept: CASE MANAGEMENT | Facility: OTHER | Age: 84
End: 2019-03-19

## 2019-04-18 ENCOUNTER — PATIENT OUTREACH (OUTPATIENT)
Dept: CASE MANAGEMENT | Facility: OTHER | Age: 84
End: 2019-04-18

## 2019-04-24 ENCOUNTER — EPISODE CHANGES (OUTPATIENT)
Dept: CASE MANAGEMENT | Facility: OTHER | Age: 84
End: 2019-04-24

## 2019-10-02 ENCOUNTER — OFFICE VISIT (OUTPATIENT)
Dept: FAMILY MEDICINE CLINIC | Facility: CLINIC | Age: 84
End: 2019-10-02

## 2019-10-02 VITALS
HEART RATE: 71 BPM | OXYGEN SATURATION: 93 % | TEMPERATURE: 97.9 F | HEIGHT: 63 IN | BODY MASS INDEX: 23.92 KG/M2 | DIASTOLIC BLOOD PRESSURE: 70 MMHG | SYSTOLIC BLOOD PRESSURE: 110 MMHG | WEIGHT: 135 LBS

## 2019-10-02 DIAGNOSIS — I10 ESSENTIAL HYPERTENSION: ICD-10-CM

## 2019-10-02 DIAGNOSIS — Z00.00 MEDICARE ANNUAL WELLNESS VISIT, SUBSEQUENT: Primary | ICD-10-CM

## 2019-10-02 DIAGNOSIS — G47.33 OSA (OBSTRUCTIVE SLEEP APNEA): ICD-10-CM

## 2019-10-02 DIAGNOSIS — E78.5 HYPERLIPIDEMIA, UNSPECIFIED HYPERLIPIDEMIA TYPE: ICD-10-CM

## 2019-10-02 PROCEDURE — G0439 PPPS, SUBSEQ VISIT: HCPCS | Performed by: FAMILY MEDICINE

## 2019-10-02 NOTE — PROGRESS NOTES
The ABCs of the Annual Wellness Visit  Subsequent Medicare Wellness Visit    Chief Complaint   Patient presents with   • Annual Exam     AWV       Subjective   History of Present Illness:  Allison Fitzpatrick is a 84 y.o. female who presents for a Subsequent Medicare Wellness Visit.  Went to [facility] for cellulitis and improved with Keflex.      HEALTH RISK ASSESSMENT    Recent Hospitalizations:  No hospitalization(s) within the last year.    Current Medical Providers:  Patient Care Team:  Haleigh Velazquez MD as PCP - General (Family Medicine)  Cheko Owens MD as PCP - Claims Attributed  Angelia Cormier MD as Consulting Physician (Otolaryngology)  Sharita Garrett MD as Consulting Physician (Cardiology)    Smoking Status:  Social History     Tobacco Use   Smoking Status Never Smoker   Smokeless Tobacco Never Used       Alcohol Consumption:  Social History     Substance and Sexual Activity   Alcohol Use No       Depression Screen:   PHQ-2/PHQ-9 Depression Screening 10/2/2019   Little interest or pleasure in doing things 0   Feeling down, depressed, or hopeless 0   Total Score 0       Fall Risk Screen:  RAMBO Fall Risk Assessment was completed, and patient is at MODERATE risk for falls. Assessment completed on:10/2/2019    Health Habits and Functional and Cognitive Screening:  Functional & Cognitive Status 10/2/2019   Do you have difficulty preparing food and eating? No   Do you have difficulty bathing yourself, getting dressed or grooming yourself? No   Do you have difficulty using the toilet? No   Do you have difficulty moving around from place to place? Yes   Do you have trouble with steps or getting out of a bed or a chair? No   Current Diet Well Balanced Diet   Dental Exam Up to date   Eye Exam Not up to date   Exercise (times per week) 2 times per week   Current Exercise Activities Include Walking   Do you need help using the phone?  Yes   Are you deaf or do you have serious difficulty hearing?  Yes   Do  you need help with transportation? Yes   Do you need help shopping? No   Do you need help preparing meals?  No   Do you need help with housework?  No   Do you need help with laundry? No   Do you need help taking your medications? No   Do you need help managing money? No   Do you ever drive or ride in a car without wearing a seat belt? No   Who do you live with? Child   If you need help, do you have trouble finding someone available to you? No   Have you been bothered in the last four weeks by sexual problems? No   Do you have difficulty concentrating, remembering or making decisions? No         Does the patient have evidence of cognitive impairment? Yes, stable     Asprin use counseling:Does not need ASA (and currently is not on it)    Age-appropriate Screening Schedule:  Refer to the list below for future screening recommendations based on patient's age, sex and/or medical conditions. Orders for these recommended tests are listed in the plan section. The patient has been provided with a written plan.    Health Maintenance   Topic Date Due   • ZOSTER VACCINE (1 of 2) 12/01/1984   • TDAP/TD VACCINES (1 - Tdap) 02/08/2028 (Originally 2/9/2018)   • LIPID PANEL  02/12/2020   • INFLUENZA VACCINE  Completed   • PNEUMOCOCCAL VACCINES (65+ LOW/MEDIUM RISK)  Completed   • MAMMOGRAM  Discontinued          The following portions of the patient's history were reviewed and updated as appropriate: allergies, current medications, past family history, past medical history, past social history, past surgical history and problem list.    Outpatient Medications Prior to Visit   Medication Sig Dispense Refill   • acetaminophen (TYLENOL) 650 MG 8 hr tablet Take 650 mg by mouth Every 8 (Eight) Hours As Needed for Mild Pain .     • vitamin B-12 (CYANOCOBALAMIN) 1000 MCG tablet Take 1,000 mcg by mouth Daily.     • aspirin  MG EC tablet Take 1 tablet by mouth Daily.     • cephalexin (KEFLEX) 500 MG capsule Take 1 capsule by mouth 3  "(Three) Times a Day. 30 capsule 0   • cyclobenzaprine (FLEXERIL) 5 MG tablet Take 1 tablet by mouth 3 (Three) Times a Day As Needed for Muscle Spasms. 60 tablet 3     No facility-administered medications prior to visit.        Patient Active Problem List   Diagnosis   • Diverticulitis of large intestine   • Near syncope   • Essential hypertension   • Bilateral hearing loss   • Gait instability   • Cholesteatoma of attic of left ear   • HLD (hyperlipidemia)   • Chest pain       Advanced Care Planning:  Patient has an advance directive - a copy has been provided and is visible in patient header, Patient has an advance directive - a copy has not been provided. Have asked the patient to send this to us to add to record    Review of Systems   Constitutional: Positive for fatigue.   Eyes: Negative.    Cardiovascular: Negative.  Negative for chest pain, palpitations and leg swelling.   Endocrine: Negative.    Genitourinary: Negative.    Musculoskeletal: Positive for arthralgias and back pain.   Neurological: Positive for weakness, light-headedness and headaches.       Compared to one year ago, the patient feels her physical health is the same.  Compared to one year ago, the patient feels her mental health is the same.    Reviewed chart for potential of high risk medication in the elderly: yes  Reviewed chart for potential of harmful drug interactions in the elderly:yes    Objective         Vitals:    10/02/19 1350   BP: 110/70   Pulse: 71   Temp: 97.9 °F (36.6 °C)   TempSrc: Oral   SpO2: 93%   Weight: 61.2 kg (135 lb)   Height: 160 cm (63\")   PainSc: 0-No pain       Body mass index is 23.91 kg/m².  Discussed the patient's BMI with her. The BMI is in the acceptable range.    Physical Exam   Constitutional: She is oriented to person, place, and time. She appears well-developed and well-nourished. No distress.   Pleasant, very hard of hearing.   HENT:   Head: Normocephalic.   Nose: Nose normal.   Eyes: EOM are normal. "   Cardiovascular: Normal rate, regular rhythm, normal heart sounds and intact distal pulses.   No murmur heard.  Pulmonary/Chest: Effort normal and breath sounds normal. No respiratory distress.   Musculoskeletal: Normal range of motion.   Neurological: She is alert and oriented to person, place, and time.   Skin: Skin is warm and dry. No rash noted.   Psychiatric: She has a normal mood and affect. Her behavior is normal. Judgment and thought content normal.   Nursing note and vitals reviewed.            Assessment/Plan   Medicare Risks and Personalized Health Plan  CMS Preventative Services Quick Reference  Advance Directive Discussion  Dementia/Memory   Immunizations Discussed/Encouraged (specific immunizations; Influenza )  Inadequate Social Support, Isolation, Loneliness, Lack of Transportation, Financial Difficulties, or Caregiver Stress     The above risks/problems have been discussed with the patient.  Pertinent information has been shared with the patient in the After Visit Summary.  Follow up plans and orders are seen below in the Assessment/Plan Section.    Diagnoses and all orders for this visit:    1. Medicare annual wellness visit, subsequent (Primary)    2. Essential hypertension  -     Comprehensive Metabolic Panel    3. Hyperlipidemia, unspecified hyperlipidemia type    4. BRENNA (obstructive sleep apnea)  -     CBC Auto Differential    Pleasant 84-year-old female here for Medicare wellness visit.  Does not want any further intervention such as mammograms or colonoscopies.  Had lipids done earlier this year, check remaining labs as above.  As she is on no medications okay to follow-up annually.  Advise flu shot, not available in office today.  Discussed eating healthy foods with adequate protein supplements.    Follow Up:  Return in about 1 year (around 10/2/2020), or if symptoms worsen or fail to improve, for Medicare Wellness.     An After Visit Summary and PPPS were given to the patient.

## 2019-10-03 LAB
ALBUMIN SERPL-MCNC: 4.3 G/DL (ref 3.5–5.2)
ALBUMIN/GLOB SERPL: 1.5 G/DL
ALP SERPL-CCNC: 61 U/L (ref 39–117)
ALT SERPL-CCNC: 16 U/L (ref 1–33)
AST SERPL-CCNC: 18 U/L (ref 1–32)
BASOPHILS # BLD AUTO: 0.04 10*3/MM3 (ref 0–0.2)
BASOPHILS NFR BLD AUTO: 0.6 % (ref 0–1.5)
BILIRUB SERPL-MCNC: 0.2 MG/DL (ref 0.2–1.2)
BUN SERPL-MCNC: 31 MG/DL (ref 8–23)
BUN/CREAT SERPL: 28.4 (ref 7–25)
CALCIUM SERPL-MCNC: 9.8 MG/DL (ref 8.6–10.5)
CHLORIDE SERPL-SCNC: 104 MMOL/L (ref 98–107)
CO2 SERPL-SCNC: 25.8 MMOL/L (ref 22–29)
CREAT SERPL-MCNC: 1.09 MG/DL (ref 0.57–1)
EOSINOPHIL # BLD AUTO: 0.39 10*3/MM3 (ref 0–0.4)
EOSINOPHIL NFR BLD AUTO: 5.9 % (ref 0.3–6.2)
ERYTHROCYTE [DISTWIDTH] IN BLOOD BY AUTOMATED COUNT: 13.6 % (ref 12.3–15.4)
GLOBULIN SER CALC-MCNC: 2.8 GM/DL
GLUCOSE SERPL-MCNC: 99 MG/DL (ref 65–99)
HCT VFR BLD AUTO: 38.4 % (ref 34–46.6)
HGB BLD-MCNC: 12.4 G/DL (ref 12–15.9)
IMM GRANULOCYTES # BLD AUTO: 0.03 10*3/MM3 (ref 0–0.05)
IMM GRANULOCYTES NFR BLD AUTO: 0.5 % (ref 0–0.5)
LYMPHOCYTES # BLD AUTO: 1.84 10*3/MM3 (ref 0.7–3.1)
LYMPHOCYTES NFR BLD AUTO: 27.9 % (ref 19.6–45.3)
MCH RBC QN AUTO: 29.6 PG (ref 26.6–33)
MCHC RBC AUTO-ENTMCNC: 32.3 G/DL (ref 31.5–35.7)
MCV RBC AUTO: 91.6 FL (ref 79–97)
MONOCYTES # BLD AUTO: 0.85 10*3/MM3 (ref 0.1–0.9)
MONOCYTES NFR BLD AUTO: 12.9 % (ref 5–12)
NEUTROPHILS # BLD AUTO: 3.45 10*3/MM3 (ref 1.7–7)
NEUTROPHILS NFR BLD AUTO: 52.2 % (ref 42.7–76)
NRBC BLD AUTO-RTO: 0 /100 WBC (ref 0–0.2)
PLATELET # BLD AUTO: 136 10*3/MM3 (ref 140–450)
POTASSIUM SERPL-SCNC: 4.4 MMOL/L (ref 3.5–5.2)
PROT SERPL-MCNC: 7.1 G/DL (ref 6–8.5)
RBC # BLD AUTO: 4.19 10*6/MM3 (ref 3.77–5.28)
SODIUM SERPL-SCNC: 142 MMOL/L (ref 136–145)
WBC # BLD AUTO: 6.6 10*3/MM3 (ref 3.4–10.8)

## 2019-10-08 NOTE — PROGRESS NOTES
Please call patient back with results.  The labs has resulted as improved kidney function, but 1 of the cell lines for blood clotting called platelets is low.  I would recommend avoiding all anti-inflammatories.  This would not only protect any risk for bleeding but also protect the kidneys.  We can recheck the blood tests in 6 months..    Thank you

## 2019-12-13 ENCOUNTER — OFFICE VISIT (OUTPATIENT)
Dept: FAMILY MEDICINE CLINIC | Facility: CLINIC | Age: 84
End: 2019-12-13

## 2019-12-13 VITALS
WEIGHT: 132 LBS | HEIGHT: 63 IN | SYSTOLIC BLOOD PRESSURE: 124 MMHG | HEART RATE: 78 BPM | BODY MASS INDEX: 23.39 KG/M2 | TEMPERATURE: 98.1 F | DIASTOLIC BLOOD PRESSURE: 67 MMHG | OXYGEN SATURATION: 98 %

## 2019-12-13 DIAGNOSIS — T16.1XXA FOREIGN BODY OF EAR, RIGHT, INITIAL ENCOUNTER: ICD-10-CM

## 2019-12-13 DIAGNOSIS — H61.21 IMPACTED CERUMEN OF RIGHT EAR: Primary | ICD-10-CM

## 2019-12-13 PROCEDURE — 69210 REMOVE IMPACTED EAR WAX UNI: CPT | Performed by: FAMILY MEDICINE

## 2019-12-13 PROCEDURE — 99213 OFFICE O/P EST LOW 20 MIN: CPT | Performed by: FAMILY MEDICINE

## 2019-12-13 NOTE — PROGRESS NOTES
Allison Fitzpatrick is a 85 y.o. female.     Chief Complaint   Patient presents with   • Cerumen Impaction     bilateral era wax build in ears pt cant hear well        HPI     Pt is a pleasant 85 y.o. YO female here for decreased hearing on the right side that has been present for many years.  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  She went to her audiologist for these issues and was told that she may have a cerumen impaction as it seems like her hearing aid was not fitting properly.  She has had issues with cerumen impactions in the past, the last one was over 6 months ago.  No pain, no redness.  No signs of infection.  Otherwise feeling well.      The following portions of the patient's history were reviewed and updated as appropriate: allergies, current medications, past family history, past medical history, past social history, past surgical history and problem list.    Review of Systems   Constitutional: Negative.    HENT: Positive for hearing loss.    Eyes: Negative.    Respiratory: Negative.    Cardiovascular: Negative.    Gastrointestinal: Negative.    Endocrine: Negative.    Genitourinary: Negative.    Musculoskeletal: Negative.    Skin: Negative.    Allergic/Immunologic: Negative.    Neurological: Negative.    Psychiatric/Behavioral: Negative.        Objective  Vitals:    12/13/19 1054   BP: 124/67   Pulse: 78   Temp: 98.1 °F (36.7 °C)   SpO2: 98%        Physical Exam   Constitutional: She is oriented to person, place, and time. She appears well-developed and well-nourished. No distress.   HENT:   Head: Normocephalic.   Nose: Nose normal.   On exam, r ear with migdalia-colored cerumen and the appearance of a perfectly circular round lip towards the anterior third of the ear canal.  Left ear with no cerumen, abnormal tympanic membrane with difficulty visualizing bony landmarks.   Eyes: EOM are normal. No scleral icterus.    Pulmonary/Chest: Effort normal. No respiratory distress.   Musculoskeletal: Normal range of motion.   Neurological: She is alert and oriented to person, place, and time.   Skin: Skin is warm and dry. No rash noted.   Psychiatric: She has a normal mood and affect. Her behavior is normal. Judgment and thought content normal.   Nursing note and vitals reviewed.        Current Outpatient Medications:   •  acetaminophen (TYLENOL) 650 MG 8 hr tablet, Take 650 mg by mouth Every 8 (Eight) Hours As Needed for Mild Pain ., Disp: , Rfl:   •  vitamin B-12 (CYANOCOBALAMIN) 1000 MCG tablet, Take 1,000 mcg by mouth Daily., Disp: , Rfl:     Ear Cerumen Removal  Date/Time: 12/13/2019 11:13 AM  Performed by: Haleigh Velazquez MD  Authorized by: Haleigh Velazquez MD   Consent: Verbal consent obtained.  Risks and benefits: risks, benefits and alternatives were discussed  Consent given by: patient  Patient understanding: patient states understanding of the procedure being performed  Patient identity confirmed: verbally with patient  Location details: right ear  Patient tolerance: Patient tolerated the procedure well with no immediate complications  Comments: Foreign body visualized in the right ear, annular lesion removed.  No complications.  Appears to be part of a hearing aid.  Procedure type: instrumentation         Lab Results (most recent)     None              Allison was seen today for cerumen impaction.    Diagnoses and all orders for this visit:    Impacted cerumen of right ear  -     Ear Cerumen Removal    Foreign body of ear, right, initial encounter  -     Ear Cerumen Removal      Pleasant 85-year-old female with worsening right-sided hearing, seen by audiologist and recommended evaluation by a doctor.  On exam there is cerumen impaction however there is also part of her hearing aid that seem to be lodged within the canal.  It was removed with tweezers without complication.  Patient tolerated well, no bleeding.  Hearing and  comfort restored.  Left ear without cerumen impaction.    Return if symptoms worsen or fail to improve.      Haleigh Velazquez MD

## 2020-04-17 ENCOUNTER — TELEPHONE (OUTPATIENT)
Dept: FAMILY MEDICINE CLINIC | Facility: CLINIC | Age: 85
End: 2020-04-17
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
## 2020-04-17 NOTE — TELEPHONE ENCOUNTER
Patients granddaughter calling in stating that the patient has a bad ear infection in her ear in her right ear  Stated that pulled a tick out of her yesterday and that it now feels infected     Would like to know if something could be called in for her,

## 2020-04-17 NOTE — TELEPHONE ENCOUNTER
Called patient at [phone], pt was bit by a tick on the ear, pulled it from behind the ear.  it does feel warm and no pain but it does itch, pulled it out.  Likely allergic irritation with start of mild cellulitis. Will avoid oral meds for right now.  Sent script for Mupirocin and OK to use OTC topical hydrocortisone cream PRN itching.

## 2020-08-13 ENCOUNTER — HOSPITAL ENCOUNTER (EMERGENCY)
Facility: HOSPITAL | Age: 85
Discharge: HOME | End: 2020-08-13
Attending: EMERGENCY MEDICINE | Admitting: EMERGENCY MEDICINE

## 2020-08-13 ENCOUNTER — APPOINTMENT (OUTPATIENT)
Dept: CT IMAGING | Facility: HOSPITAL | Age: 85
End: 2020-08-13

## 2020-08-13 ENCOUNTER — APPOINTMENT (OUTPATIENT)
Dept: GENERAL RADIOLOGY | Facility: HOSPITAL | Age: 85
End: 2020-08-13

## 2020-08-13 VITALS
OXYGEN SATURATION: 94 % | HEIGHT: 63 IN | WEIGHT: 138.4 LBS | TEMPERATURE: 98.1 F | DIASTOLIC BLOOD PRESSURE: 92 MMHG | HEART RATE: 73 BPM | SYSTOLIC BLOOD PRESSURE: 181 MMHG | RESPIRATION RATE: 16 BRPM | BODY MASS INDEX: 24.52 KG/M2

## 2020-08-13 DIAGNOSIS — R53.1 GENERALIZED WEAKNESS: Primary | ICD-10-CM
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
## 2020-08-13 LAB
ALBUMIN SERPL-MCNC: 4.2 G/DL (ref 3.5–5.2)
ALBUMIN/GLOB SERPL: 1.8 G/DL
ALP SERPL-CCNC: 54 U/L (ref 39–117)
ALT SERPL W P-5'-P-CCNC: 15 U/L (ref 1–33)
ANION GAP SERPL CALCULATED.3IONS-SCNC: 13.9 MMOL/L (ref 5–15)
AST SERPL-CCNC: 20 U/L (ref 1–32)
BACTERIA UR QL AUTO: ABNORMAL /HPF
BASOPHILS # BLD AUTO: 0.01 10*3/MM3 (ref 0–0.2)
BASOPHILS NFR BLD AUTO: 0.2 % (ref 0–1.5)
BILIRUB SERPL-MCNC: 0.5 MG/DL (ref 0–1.2)
BILIRUB UR QL STRIP: NEGATIVE
BUN SERPL-MCNC: 21 MG/DL (ref 8–23)
BUN/CREAT SERPL: 22.6 (ref 7–25)
CALCIUM SPEC-SCNC: 9.1 MG/DL (ref 8.6–10.5)
CHLORIDE SERPL-SCNC: 103 MMOL/L (ref 98–107)
CLARITY UR: CLEAR
CO2 SERPL-SCNC: 25.1 MMOL/L (ref 22–29)
COLOR UR: YELLOW
CREAT SERPL-MCNC: 0.93 MG/DL (ref 0.57–1)
DEPRECATED RDW RBC AUTO: 46.7 FL (ref 37–54)
EOSINOPHIL # BLD AUTO: 0.2 10*3/MM3 (ref 0–0.4)
EOSINOPHIL NFR BLD AUTO: 3.5 % (ref 0.3–6.2)
ERYTHROCYTE [DISTWIDTH] IN BLOOD BY AUTOMATED COUNT: 13.8 % (ref 12.3–15.4)
GFR SERPL CREATININE-BSD FRML MDRD: 57 ML/MIN/1.73
GLOBULIN UR ELPH-MCNC: 2.4 GM/DL
GLUCOSE SERPL-MCNC: 109 MG/DL (ref 65–99)
GLUCOSE UR STRIP-MCNC: NEGATIVE MG/DL
HCT VFR BLD AUTO: 36.7 % (ref 34–46.6)
HGB BLD-MCNC: 12.5 G/DL (ref 12–15.9)
HGB UR QL STRIP.AUTO: NEGATIVE
HYALINE CASTS UR QL AUTO: ABNORMAL /LPF
IMM GRANULOCYTES # BLD AUTO: 0.03 10*3/MM3 (ref 0–0.05)
IMM GRANULOCYTES NFR BLD AUTO: 0.5 % (ref 0–0.5)
KETONES UR QL STRIP: NEGATIVE
LEUKOCYTE ESTERASE UR QL STRIP.AUTO: ABNORMAL
LYMPHOCYTES # BLD AUTO: 1.08 10*3/MM3 (ref 0.7–3.1)
LYMPHOCYTES NFR BLD AUTO: 18.8 % (ref 19.6–45.3)
MAGNESIUM SERPL-MCNC: 2.1 MG/DL (ref 1.6–2.4)
MCH RBC QN AUTO: 31.2 PG (ref 26.6–33)
MCHC RBC AUTO-ENTMCNC: 34.1 G/DL (ref 31.5–35.7)
MCV RBC AUTO: 91.5 FL (ref 79–97)
MONOCYTES # BLD AUTO: 0.67 10*3/MM3 (ref 0.1–0.9)
MONOCYTES NFR BLD AUTO: 11.7 % (ref 5–12)
NEUTROPHILS NFR BLD AUTO: 3.74 10*3/MM3 (ref 1.7–7)
NEUTROPHILS NFR BLD AUTO: 65.3 % (ref 42.7–76)
NITRITE UR QL STRIP: NEGATIVE
NRBC BLD AUTO-RTO: 0 /100 WBC (ref 0–0.2)
PH UR STRIP.AUTO: 5.5 [PH] (ref 5–8)
PLATELET # BLD AUTO: 182 10*3/MM3 (ref 140–450)
PMV BLD AUTO: 10.9 FL (ref 6–12)
POTASSIUM SERPL-SCNC: 3.8 MMOL/L (ref 3.5–5.2)
PROT SERPL-MCNC: 6.6 G/DL (ref 6–8.5)
PROT UR QL STRIP: ABNORMAL
RBC # BLD AUTO: 4.01 10*6/MM3 (ref 3.77–5.28)
RBC # UR: ABNORMAL /HPF
REF LAB TEST METHOD: ABNORMAL
SODIUM SERPL-SCNC: 142 MMOL/L (ref 136–145)
SP GR UR STRIP: 1.02 (ref 1–1.03)
SQUAMOUS #/AREA URNS HPF: ABNORMAL /HPF
TROPONIN T SERPL-MCNC: <0.01 NG/ML (ref 0–0.03)
UROBILINOGEN UR QL STRIP: ABNORMAL
WBC # BLD AUTO: 5.73 10*3/MM3 (ref 3.4–10.8)
WBC UR QL AUTO: ABNORMAL /HPF
YEAST URNS QL MICRO: ABNORMAL /HPF

## 2020-08-13 PROCEDURE — 85025 COMPLETE CBC W/AUTO DIFF WBC: CPT | Performed by: PHYSICIAN ASSISTANT

## 2020-08-13 PROCEDURE — 84484 ASSAY OF TROPONIN QUANT: CPT | Performed by: PHYSICIAN ASSISTANT

## 2020-08-13 PROCEDURE — 93010 ELECTROCARDIOGRAM REPORT: CPT | Performed by: INTERNAL MEDICINE

## 2020-08-13 PROCEDURE — 80053 COMPREHEN METABOLIC PANEL: CPT | Performed by: PHYSICIAN ASSISTANT

## 2020-08-13 PROCEDURE — 81001 URINALYSIS AUTO W/SCOPE: CPT | Performed by: PHYSICIAN ASSISTANT

## 2020-08-13 PROCEDURE — 99284 EMERGENCY DEPT VISIT MOD MDM: CPT

## 2020-08-13 PROCEDURE — 83735 ASSAY OF MAGNESIUM: CPT | Performed by: PHYSICIAN ASSISTANT

## 2020-08-13 PROCEDURE — 93005 ELECTROCARDIOGRAM TRACING: CPT | Performed by: PHYSICIAN ASSISTANT

## 2020-08-13 PROCEDURE — 70450 CT HEAD/BRAIN W/O DYE: CPT

## 2020-08-13 PROCEDURE — 71045 X-RAY EXAM CHEST 1 VIEW: CPT

## 2020-08-13 NOTE — DISCHARGE INSTRUCTIONS
Please use your walker at home to ambulate.  Call your primary care doctor for close follow-up.  Return to the ER if you develop any concerning or worsening symptoms.

## 2020-08-13 NOTE — ED PROVIDER NOTES
"EMERGENCY DEPARTMENT ENCOUNTER    Room Number:  06/06  Date seen:  8/13/2020  Time seen: 11:28 AM  PCP: Haleigh Velazquez MD  Historian: Patient and granddaughter    HPI:  Chief complaint: Generalized weakness  A complete HPI/ROS/PMH/PSH/SH/FH are unobtainable due to: None  Context:Allison Fitzpatrick is a 85 y.o. female,  who presents to the ED with c/o standing on her porch 2 days ago when patient suddenly felt dizzy \"lightheadedness\" and bilateral lower extremity heaviness started. At that time she had a witnessed fall by her son and was on her porch for approximately 5 minutes until she was helped back up. Pt had a second episode of dizziness and bilateral LE heaviness PTA while she was in her kitchen. Denies any head injury, hip injury, or any other injuries. Per granddaughter, pt usually uses a walker to ambulate but hasnt been using it lately. She also expresses concern that pt has been overexerting herself more than usual and thinks may be contributing to her LE \"heaviness.\"      Per granddaughter, patient had a second fall while she was in her kitchen today with the same symptoms of dizziness and bilateral lower extremity heaviness.    Has a history of neck pain which she says is not worse than usual.    Patient was placed in face mask in first look. Patient was wearing facemask when I entered the room and throughout our encounter. I wore full protective equipment throughout this patient encounter including a face mask, and gloves. Hand hygiene was performed before donning protective equipment and after removal when leaving the room.      MEDICAL RECORD REVIEW      ALLERGIES  Patient has no known allergies.    PAST MEDICAL HISTORY  Active Ambulatory Problems     Diagnosis Date Noted   • Diverticulitis of large intestine 03/28/2016   • Near syncope 04/02/2016   • Essential hypertension 02/08/2018   • Bilateral hearing loss 02/08/2018   • Gait instability 02/08/2018   • Cholesteatoma of attic of left ear " 2014   • HLD (hyperlipidemia) 2018   • Chest pain 2018     Resolved Ambulatory Problems     Diagnosis Date Noted   • Syncope and collapse 2018   • Fracture of one rib of left side 2018     Past Medical History:   Diagnosis Date   • Arthritis    • Diverticulitis    • Diverticulosis    • Hard of hearing    • History of colon polyps    • Hypertension    • Irregular heart beat    • Macular degeneration        PAST SURGICAL HISTORY  Past Surgical History:   Procedure Laterality Date   • ABDOMINAL SURGERY     • CATARACT EXTRACTION Bilateral    • COLONOSCOPY N/A 2016    ih, tics, hp polyp    • COLONOSCOPY W/ POLYPECTOMY     • ENDOSCOPY N/A 2016    nml    • HYSTERECTOMY     • TONSILLECTOMY         FAMILY HISTORY  Family History   Problem Relation Age of Onset   • Colon cancer Mother    • Diabetes type II Sister    • Diabetes type II Brother    • Stomach cancer Brother    • Coronary artery disease Father 60         MI in 60s   • Breast cancer Neg Hx    • Stroke Neg Hx        SOCIAL HISTORY  Social History     Socioeconomic History   • Marital status:      Spouse name: Not on file   • Number of children: Not on file   • Years of education: Not on file   • Highest education level: Not on file   Tobacco Use   • Smoking status: Never Smoker   • Smokeless tobacco: Never Used   Substance and Sexual Activity   • Alcohol use: No   • Drug use: No   • Sexual activity: Defer       REVIEW OF SYSTEMS  Review of Systems    All systems reviewed and negative except for those discussed in HPI.     PHYSICAL EXAM    ED Triage Vitals   Temp Heart Rate Resp BP SpO2   20 1049 20 1049 20 1049 20 1125 20 1049   98.1 °F (36.7 °C) 76 18 161/85 94 %      Temp src Heart Rate Source Patient Position BP Location FiO2 (%)   20 1049 20 1125 20 1125 20 1125 --   Tympanic Monitor Lying Right arm      Physical Exam    I have reviewed the triage vital signs  and nursing notes.      GENERAL: not distressed; hard of hearing, aaox3; elderly  HENT: nares patent  EYES: no scleral icterus  NECK: no ROM limitations  CV: regular rhythm, regular rate  RESPIRATORY: normal effort  MUSCULOSKELETAL: no deformity  NEURO: alert, moves all extremities, follows commands  SKIN: warm, dry    LAB RESULTS  Recent Results (from the past 24 hour(s))   Comprehensive Metabolic Panel    Collection Time: 08/13/20 11:56 AM   Result Value Ref Range    Glucose 109 (H) 65 - 99 mg/dL    BUN 21 8 - 23 mg/dL    Creatinine 0.93 0.57 - 1.00 mg/dL    Sodium 142 136 - 145 mmol/L    Potassium 3.8 3.5 - 5.2 mmol/L    Chloride 103 98 - 107 mmol/L    CO2 25.1 22.0 - 29.0 mmol/L    Calcium 9.1 8.6 - 10.5 mg/dL    Total Protein 6.6 6.0 - 8.5 g/dL    Albumin 4.20 3.50 - 5.20 g/dL    ALT (SGPT) 15 1 - 33 U/L    AST (SGOT) 20 1 - 32 U/L    Alkaline Phosphatase 54 39 - 117 U/L    Total Bilirubin 0.5 0.0 - 1.2 mg/dL    eGFR Non African Amer 57 (L) >60 mL/min/1.73    Globulin 2.4 gm/dL    A/G Ratio 1.8 g/dL    BUN/Creatinine Ratio 22.6 7.0 - 25.0    Anion Gap 13.9 5.0 - 15.0 mmol/L   Troponin    Collection Time: 08/13/20 11:56 AM   Result Value Ref Range    Troponin T <0.010 0.000 - 0.030 ng/mL   CBC Auto Differential    Collection Time: 08/13/20 11:56 AM   Result Value Ref Range    WBC 5.73 3.40 - 10.80 10*3/mm3    RBC 4.01 3.77 - 5.28 10*6/mm3    Hemoglobin 12.5 12.0 - 15.9 g/dL    Hematocrit 36.7 34.0 - 46.6 %    MCV 91.5 79.0 - 97.0 fL    MCH 31.2 26.6 - 33.0 pg    MCHC 34.1 31.5 - 35.7 g/dL    RDW 13.8 12.3 - 15.4 %    RDW-SD 46.7 37.0 - 54.0 fl    MPV 10.9 6.0 - 12.0 fL    Platelets 182 140 - 450 10*3/mm3    Neutrophil % 65.3 42.7 - 76.0 %    Lymphocyte % 18.8 (L) 19.6 - 45.3 %    Monocyte % 11.7 5.0 - 12.0 %    Eosinophil % 3.5 0.3 - 6.2 %    Basophil % 0.2 0.0 - 1.5 %    Immature Grans % 0.5 0.0 - 0.5 %    Neutrophils, Absolute 3.74 1.70 - 7.00 10*3/mm3    Lymphocytes, Absolute 1.08 0.70 - 3.10 10*3/mm3     Monocytes, Absolute 0.67 0.10 - 0.90 10*3/mm3    Eosinophils, Absolute 0.20 0.00 - 0.40 10*3/mm3    Basophils, Absolute 0.01 0.00 - 0.20 10*3/mm3    Immature Grans, Absolute 0.03 0.00 - 0.05 10*3/mm3    nRBC 0.0 0.0 - 0.2 /100 WBC   Magnesium    Collection Time: 08/13/20 11:56 AM   Result Value Ref Range    Magnesium 2.1 1.6 - 2.4 mg/dL   Urinalysis With Microscopic If Indicated (No Culture) - Urine, Clean Catch    Collection Time: 08/13/20  2:46 PM   Result Value Ref Range    Color, UA Yellow Yellow, Straw    Appearance, UA Clear Clear    pH, UA 5.5 5.0 - 8.0    Specific Gravity, UA 1.019 1.005 - 1.030    Glucose, UA Negative Negative    Ketones, UA Negative Negative    Bilirubin, UA Negative Negative    Blood, UA Negative Negative    Protein, UA Trace (A) Negative    Leuk Esterase, UA Trace (A) Negative    Nitrite, UA Negative Negative    Urobilinogen, UA 0.2 E.U./dL 0.2 - 1.0 E.U./dL   Urinalysis, Microscopic Only - Urine, Clean Catch    Collection Time: 08/13/20  2:46 PM   Result Value Ref Range    RBC, UA None Seen None Seen, 0-2 /HPF    WBC, UA 3-5 (A) None Seen, 0-2 /HPF    Bacteria, UA Trace (A) None Seen /HPF    Squamous Epithelial Cells, UA None Seen None Seen, 0-2 /HPF    Yeast, UA Small/1+ Budding Yeast None Seen /HPF    Hyaline Casts, UA None Seen None Seen /LPF    Methodology Manual Light Microscopy          RADIOLOGY RESULTS  CT Head Without Contrast   Final Result       No acute intracranial hemorrhage or hydrocephalus. Chronic changes of   the brain. If there is further clinical concern, MRI could be considered   for further evaluation.       This report was finalized on 8/13/2020 4:49 PM by Dr. Josh Shea M.D.          XR Chest 1 View   Final Result            PROGRESS, DATA ANALYSIS, CONSULTS AND MEDICAL DECISION MAKING  All labs have been independently reviewed by me.  All radiology studies have been reviewed by me and discussed with radiologist dictating the report. Discussion below  "represents my analysis of pertinent findings related to patient's condition, differential diagnosis, treatment plan and final disposition.     ED Course as of Aug 13 1935   Thu Aug 13, 2020   1646 Patient denies dysuria, urinary frequency, decreased urination, or foul odor to her urine.  I do not plan to treat this asymptomatic UTI at this time.    [SS]   1659 Rechecked patient informed unremarkable CT head results.  Granddaughter says that patient has been overexerting herself more than usual the past few weeks.  Strongly advised patient to continue use her walker to get around.    [SS]      ED Course User Index  [SS] Natacha Okeefe PA-C              Reviewed pt's history and workup with Dr. Gaona.  After a bedside evaluation, Dr. Gaona agrees with the plan of care.    (FOR DISCHARGE)The patient's history, physical exam, and lab findings were discussed with the physician, who also performed a face to face history and physical exam.  I discussed all results and noted any abnormalities with patient.  Discussed absoute need to recheck abnormalities with their family physician.  I answered any of the patient's questions.  Discussed plan for discharge, as there is no emergent indication for admission.  Pt is agreeable and understands need for follow up and repeat testing.  Pt is aware that discharge does not mean that nothing is wrong but it indicates no emergency is present and they must continue care with their family physician.  Pt is discharged with instructions to follow up with primary care doctor to have their blood pressure rechecked.                 Disposition vitals:  BP (!) 181/92 (BP Location: Right arm, Patient Position: Lying)   Pulse 73   Temp 98.1 °F (36.7 °C) (Tympanic)   Resp 16   Ht 160 cm (63\")   Wt 62.8 kg (138 lb 6.4 oz)   LMP  (LMP Unknown)   SpO2 94%   BMI 24.52 kg/m²       DIAGNOSIS  Final diagnoses:   Generalized weakness   Fall, initial encounter       FOLLOW UP   Haleigh Velazquez, " MD  [address] [address] [phone]    Call in 2 days  For close follow-up on your falls    [facility] Emergency Department  [address] [place] [facility] [address] [phone]    As needed, If symptoms worsen         Natacha Okeefe PA-C  08/13/20 6474

## 2020-08-13 NOTE — ED PROVIDER NOTES
Pt presents to the ED complaining of fall today.  The patient states she was up and walking and felt like both of her legs would not move which caused her to lose her balance and fall.  She denies any trauma with the fall.  Denies hitting her head or passing out or loss of consciousness.  She states she had one episode like this earlier this week.    On exam, pt is A&Ox3. NAD  PERRL, moist mucous membranes.  Normocephalic and atraumatic  Heart is RRR. Lungs are CTAB.   Abd is soft, non tender, non distended, bowel sounds positive.   No pedal edema.  No calf tenderness.  No extremity trauma with full range of motion of bilateral lower extremities without difficulty  Normal neuro exam      I agree with midlevel plan to check labs, head CT and then ambulate patient    Patient's work-up in the ER including head CT are negative acute.  The patient has been able to ambulate and stable for discharge.  The patient and family understand and agree with the plan.    PPE  Pt does not present with symptoms for COVID19; however, I was wearing a mask throughout all patient interaction.          The KAYLI and I have discussed this patient's history, physical exam, and treatment plan.  I have reviewed the documentation and personally had a face to face interaction with the patient. I affirm the documentation and agree with the treatment and plan.  The attached note describes my personal findings.           Oziel Gaona MD  08/13/20 1844     Principal Discharge DX:	Abscess Principal Discharge DX:	Post-operative complication  Secondary Diagnosis:	Cellulitis

## 2020-08-13 NOTE — ED TRIAGE NOTES
"Pt had + dizziness and fall on Tuesday, landed on both knees, pt states \"legs feel like wood\" and weakness since time of fall. Pt reporting blurred vision since fall.     Pt placed in mask at triage, all staff wearing appropriate ppe     "
Pt was able to stand independently to get into stretcher. States does have tingling to BLE but not new or changed since fall. Pt also reports chronic neck pain since prior to fall. Pt denies LOC, knee pain since falling. Pt normally ambulates with walker, very [place]. States pain present to top of head today.     Pt placed in mask at triage, all staff wearing appropriate ppe     
29-May-2018 18:20

## 2020-08-13 NOTE — ED NOTES
"Patient reports that she has fallen twice in the past week, due to \"my legs feeling like logs\". Patient reports that she is supposed to use a walker but both times she fell, she was not using her walker. Patient denies hitting her head or any LOC. Patient reports having bilateral knee pain from falling. Patient and family in masks. This RN in mask and goggles.      Amie Selby RN  08/13/20 1221    "

## 2020-08-19 ENCOUNTER — TELEPHONE (OUTPATIENT)
Dept: FAMILY MEDICINE CLINIC | Facility: CLINIC | Age: 85
End: 2020-08-19

## 2020-08-19 ENCOUNTER — OFFICE VISIT (OUTPATIENT)
Dept: FAMILY MEDICINE CLINIC | Facility: CLINIC | Age: 85
End: 2020-08-19

## 2020-08-19 VITALS
WEIGHT: 138 LBS | DIASTOLIC BLOOD PRESSURE: 82 MMHG | OXYGEN SATURATION: 95 % | BODY MASS INDEX: 24.45 KG/M2 | HEART RATE: 77 BPM | HEIGHT: 63 IN | TEMPERATURE: 96.9 F | SYSTOLIC BLOOD PRESSURE: 168 MMHG

## 2020-08-19 DIAGNOSIS — R29.898 WEAKNESS OF BOTH LOWER EXTREMITIES: ICD-10-CM

## 2020-08-19 DIAGNOSIS — N30.00 ACUTE CYSTITIS WITHOUT HEMATURIA: Primary | ICD-10-CM

## 2020-08-19 DIAGNOSIS — I10 ESSENTIAL HYPERTENSION: ICD-10-CM

## 2020-08-19 DIAGNOSIS — R27.8 ACUTE ATAXIA: ICD-10-CM
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
## 2020-08-19 DIAGNOSIS — N39.0 URINARY TRACT INFECTION WITHOUT HEMATURIA, SITE UNSPECIFIED: ICD-10-CM

## 2020-08-19 DIAGNOSIS — G44.319 ACUTE POST-TRAUMATIC HEADACHE, NOT INTRACTABLE: ICD-10-CM

## 2020-08-19 DIAGNOSIS — S13.4XXA WHIPLASH INJURY TO NECK, INITIAL ENCOUNTER: ICD-10-CM

## 2020-08-19 PROCEDURE — 99214 OFFICE O/P EST MOD 30 MIN: CPT | Performed by: FAMILY MEDICINE

## 2020-08-19 PROCEDURE — 96372 THER/PROPH/DIAG INJ SC/IM: CPT | Performed by: FAMILY MEDICINE

## 2020-08-19 RX ORDER — SULFAMETHOXAZOLE AND TRIMETHOPRIM 800; 160 MG/1; MG/1
1 TABLET ORAL 2 TIMES DAILY
Qty: 14 TABLET | Refills: 0 | Status: SHIPPED | OUTPATIENT
Start: 2020-08-19 | End: 2020-08-22

## 2020-08-19 NOTE — TELEPHONE ENCOUNTER
Patient and granddaughter called and wants to see how she can be added to BH verbal. She helps take care of her  Grandmother. Please call back [phone] and advise on that process.

## 2020-08-19 NOTE — TELEPHONE ENCOUNTER
Informed patient's grand-daughter that patient will have to sign a new HIPPA form to be able to be added.

## 2020-08-19 NOTE — PROGRESS NOTES
Allison Fitzpatrick is a 85 y.o. female.     Chief Complaint   Patient presents with   • Difficulty Walking     Patient fell twice last week and went to ER records are in chart. She said her legs just lock up and she can't move and then she fell. She is having headaches, her b/p was high in the ER was told to f/u with primary care. Patient is having a hard time swallowing her food        HPI     Pt is a pleasant 85 y.o. YO female here for difficulty walking, she fell at home on August 13, feels that her legs essentially gave out on her.  She had no loss of consciousness or trauma to the head.  Evaluate shin at the emergency room was reassuring showing normal chest x-ray and CT scan of the head without acute intracranial abnormalities.  She had symptoms of dizzy, lightheadedness and bilateral lower extremity heaviness.  UA does show trace bacteria, positive for protein and leukocyte Estrace.  As she was asymptomatic for dysuria the she was not started on antibiotics in the emergency room.  She is continued to have symptoms of higher blood pressure, headaches and now with difficulty swallowing her food.      The following portions of the patient's history were reviewed and updated as appropriate: allergies, current medications, past family history, past medical history, past social history, past surgical history and problem list.    Review of Systems   Constitutional: Positive for fatigue. Negative for activity change and appetite change.   HENT: Negative for congestion and dental problem.    Eyes: Negative for discharge and itching.   Respiratory: Negative for apnea and chest tightness.    Cardiovascular: Negative for chest pain, palpitations and leg swelling.   Gastrointestinal: Positive for diarrhea.   Endocrine: Negative for cold intolerance and heat intolerance.   Genitourinary: Negative for difficulty urinating and dyspareunia.   Musculoskeletal: Positive for gait problem.   Skin: Negative for color change and  pallor.   Allergic/Immunologic: Negative for environmental allergies and food allergies.   Neurological: Positive for dizziness and headaches.   Hematological: Negative for adenopathy. Does not bruise/bleed easily.   Psychiatric/Behavioral: Negative for agitation and behavioral problems.       Objective  Vitals:    08/19/20 0956   BP: 168/82   Pulse:    Temp:    SpO2:         Physical Exam   Constitutional: She is oriented to person, place, and time. She appears well-developed and well-nourished. No distress.   HENT:   Head: Normocephalic.   Nose: Nose normal.   Eyes: EOM are normal.   Cardiovascular: Normal rate, regular rhythm, normal heart sounds and intact distal pulses.   No murmur heard.  Pulmonary/Chest: Effort normal and breath sounds normal. No respiratory distress.   Musculoskeletal: Normal range of motion.   Patient with 5 out of 5 strength in bilateral legs but difficulty with walking in a straight line.  Unable to tandem walk, often overcorrects with her foot when walking.  Sensation and remaining neurologic exam is intact   Neurological: She is alert and oriented to person, place, and time.   Skin: Skin is warm and dry. No rash noted.   Psychiatric: She has a normal mood and affect. Her behavior is normal. Judgment and thought content normal.   Nursing note and vitals reviewed.        Current Outpatient Medications:   •  acetaminophen (TYLENOL) 650 MG 8 hr tablet, Take 650 mg by mouth Every 8 (Eight) Hours As Needed for Mild Pain ., Disp: , Rfl:   •  Loperamide HCl (IMODIUM PO), Take  by mouth., Disp: , Rfl:   •  vitamin B-12 (CYANOCOBALAMIN) 1000 MCG tablet, Take 1,000 mcg by mouth Daily., Disp: , Rfl:   •  sulfamethoxazole-trimethoprim (BACTRIM DS,SEPTRA DS) 800-160 MG per tablet, Take 1 tablet by mouth 2 (Two) Times a Day for 7 days., Disp: 14 tablet, Rfl: 0  No current facility-administered medications for this visit.     Procedures    Lab Results (most recent)     None              Allison beavers  seen today for difficulty walking.    Diagnoses and all orders for this visit:    Acute cystitis without hematuria  -     sulfamethoxazole-trimethoprim (BACTRIM DS,SEPTRA DS) 800-160 MG per tablet; Take 1 tablet by mouth 2 (Two) Times a Day for 7 days.  -     Ambulatory Referral to Home Health    Fall (on) (from) other stairs and steps, sequela  -     MRI Brain Without Contrast; Future  -     Ambulatory Referral to Home Health    Weakness of both lower extremities  -     MRI Brain Without Contrast; Future  -     Ambulatory Referral to Home Health    Acute ataxia  -     MRI Brain Without Contrast; Future  -     Ambulatory Referral to Home Health    Acute post-traumatic headache, not intractable  -     MRI Brain Without Contrast; Future  -     Ambulatory Referral to Home Health    Essential hypertension  -     Ambulatory Referral to Home Health    Whiplash injury to neck, initial encounter  -     Ambulatory Referral to Home Health    Urinary tract infection without hematuria, site unspecified  -     cefTRIAXone (ROCEPHIN) 350 mg/ml in lidocaine 1% IM syringe (1 gm vial)      Patient with fall at home, follow-up after evaluation in the emergency room.  No acute intracranial process but I do suspect possible CVA.  MRI as above.  Will refer to home health and treat UTI as I do think she is symptomatic with symptoms of fall and episodic altered mental status that waxes and wanes.  Stable currently.  Follow-up in 1 month or sooner if patient worsens or does not improve.    Return in about 4 weeks (around 9/16/2020), or if symptoms worsen or fail to improve, for Recheck ataxia and overall decline.      Haleigh Velazquez MD

## 2020-08-21 ENCOUNTER — TELEPHONE (OUTPATIENT)
Dept: FAMILY MEDICINE CLINIC | Facility: CLINIC | Age: 85
End: 2020-08-21

## 2020-08-21 NOTE — TELEPHONE ENCOUNTER
PT'S DAUGHTER CALLED TO MAKE SURE PT'S NECK WILL BE INCLUDED IN THE MRI, AS THERE ARE CONCERNS ABOUT HER NECK.    PLEASE ADVISE.    CALLBACK NUMBER: [phone]

## 2020-08-21 NOTE — TELEPHONE ENCOUNTER
The MRI is only an MRI of the brain.  Unfortunately I would not be able to get an MRI of the neck approved as she has not met the qualifications for those (we dont have an xray/ pt/ etc).  If she is continuing to have issues please make an appointment.  It seems that her symptoms are more from whiplash which would improve with physical therapy, ice and rest.

## 2020-08-22 ENCOUNTER — APPOINTMENT (OUTPATIENT)
Dept: CT IMAGING | Facility: HOSPITAL | Age: 85
End: 2020-08-22

## 2020-08-22 ENCOUNTER — HOSPITAL ENCOUNTER (INPATIENT)
Facility: HOSPITAL | Age: 85
LOS: 7 days | Discharge: NURSING, REHAB OR LONG-TERM CARE | End: 2020-08-29
Attending: EMERGENCY MEDICINE | Admitting: HOSPITALIST

## 2020-08-22 ENCOUNTER — APPOINTMENT (OUTPATIENT)
Dept: GENERAL RADIOLOGY | Facility: HOSPITAL | Age: 85
End: 2020-08-22

## 2020-08-22 DIAGNOSIS — I95.1 ORTHOSTATIC HYPOTENSION: ICD-10-CM

## 2020-08-22 DIAGNOSIS — R29.6 MULTIPLE FALLS: ICD-10-CM

## 2020-08-22 DIAGNOSIS — J18.9 COMMUNITY ACQUIRED PNEUMONIA OF LEFT LUNG, UNSPECIFIED PART OF LUNG: Primary | ICD-10-CM

## 2020-08-22 LAB
ALBUMIN SERPL-MCNC: 4.1 G/DL (ref 3.5–5.2)
ALBUMIN/GLOB SERPL: 1.5 G/DL
ALP SERPL-CCNC: 52 U/L (ref 39–117)
ALT SERPL W P-5'-P-CCNC: 17 U/L (ref 1–33)
ANION GAP SERPL CALCULATED.3IONS-SCNC: 9.6 MMOL/L (ref 5–15)
AST SERPL-CCNC: 23 U/L (ref 1–32)
B PARAPERT DNA SPEC QL NAA+PROBE: NOT DETECTED
B PERT DNA SPEC QL NAA+PROBE: NOT DETECTED
BASOPHILS # BLD AUTO: 0.02 10*3/MM3 (ref 0–0.2)
BASOPHILS NFR BLD AUTO: 0.3 % (ref 0–1.5)
BILIRUB SERPL-MCNC: 0.2 MG/DL (ref 0–1.2)
BILIRUB UR QL STRIP: NEGATIVE
BUN SERPL-MCNC: 22 MG/DL (ref 8–23)
BUN/CREAT SERPL: 14.9 (ref 7–25)
C PNEUM DNA NPH QL NAA+NON-PROBE: NOT DETECTED
CALCIUM SPEC-SCNC: 9.3 MG/DL (ref 8.6–10.5)
CHLORIDE SERPL-SCNC: 99 MMOL/L (ref 98–107)
CLARITY UR: CLEAR
CO2 SERPL-SCNC: 21.4 MMOL/L (ref 22–29)
COLOR UR: YELLOW
CREAT SERPL-MCNC: 1.48 MG/DL (ref 0.57–1)
DEPRECATED RDW RBC AUTO: 45.1 FL (ref 37–54)
EOSINOPHIL # BLD AUTO: 0.28 10*3/MM3 (ref 0–0.4)
EOSINOPHIL NFR BLD AUTO: 4 % (ref 0.3–6.2)
ERYTHROCYTE [DISTWIDTH] IN BLOOD BY AUTOMATED COUNT: 13.2 % (ref 12.3–15.4)
FLUAV H1 2009 PAND RNA NPH QL NAA+PROBE: NOT DETECTED
FLUAV H1 HA GENE NPH QL NAA+PROBE: NOT DETECTED
FLUAV H3 RNA NPH QL NAA+PROBE: NOT DETECTED
FLUAV SUBTYP SPEC NAA+PROBE: NOT DETECTED
FLUBV RNA ISLT QL NAA+PROBE: NOT DETECTED
GFR SERPL CREATININE-BSD FRML MDRD: 34 ML/MIN/1.73
GLOBULIN UR ELPH-MCNC: 2.7 GM/DL
GLUCOSE SERPL-MCNC: 134 MG/DL (ref 65–99)
GLUCOSE UR STRIP-MCNC: NEGATIVE MG/DL
HADV DNA SPEC NAA+PROBE: NOT DETECTED
HCOV 229E RNA SPEC QL NAA+PROBE: NOT DETECTED
HCOV HKU1 RNA SPEC QL NAA+PROBE: NOT DETECTED
HCOV NL63 RNA SPEC QL NAA+PROBE: NOT DETECTED
HCOV OC43 RNA SPEC QL NAA+PROBE: NOT DETECTED
HCT VFR BLD AUTO: 38.6 % (ref 34–46.6)
HGB BLD-MCNC: 12.6 G/DL (ref 12–15.9)
HGB UR QL STRIP.AUTO: NEGATIVE
HMPV RNA NPH QL NAA+NON-PROBE: NOT DETECTED
HPIV1 RNA SPEC QL NAA+PROBE: NOT DETECTED
HPIV2 RNA SPEC QL NAA+PROBE: NOT DETECTED
HPIV3 RNA NPH QL NAA+PROBE: NOT DETECTED
HPIV4 P GENE NPH QL NAA+PROBE: NOT DETECTED
IMM GRANULOCYTES # BLD AUTO: 0.02 10*3/MM3 (ref 0–0.05)
IMM GRANULOCYTES NFR BLD AUTO: 0.3 % (ref 0–0.5)
KETONES UR QL STRIP: NEGATIVE
LEUKOCYTE ESTERASE UR QL STRIP.AUTO: NEGATIVE
LYMPHOCYTES # BLD AUTO: 1.02 10*3/MM3 (ref 0.7–3.1)
LYMPHOCYTES NFR BLD AUTO: 14.7 % (ref 19.6–45.3)
M PNEUMO IGG SER IA-ACNC: NOT DETECTED
MCH RBC QN AUTO: 30.2 PG (ref 26.6–33)
MCHC RBC AUTO-ENTMCNC: 32.6 G/DL (ref 31.5–35.7)
MCV RBC AUTO: 92.6 FL (ref 79–97)
MONOCYTES # BLD AUTO: 0.78 10*3/MM3 (ref 0.1–0.9)
MONOCYTES NFR BLD AUTO: 11.3 % (ref 5–12)
NEUTROPHILS NFR BLD AUTO: 4.8 10*3/MM3 (ref 1.7–7)
NEUTROPHILS NFR BLD AUTO: 69.4 % (ref 42.7–76)
NITRITE UR QL STRIP: NEGATIVE
NRBC BLD AUTO-RTO: 0 /100 WBC (ref 0–0.2)
NT-PROBNP SERPL-MCNC: 337.9 PG/ML (ref 0–1800)
PH UR STRIP.AUTO: 5.5 [PH] (ref 5–8)
PLATELET # BLD AUTO: 211 10*3/MM3 (ref 140–450)
PMV BLD AUTO: 9.9 FL (ref 6–12)
POTASSIUM SERPL-SCNC: 4.3 MMOL/L (ref 3.5–5.2)
PROT SERPL-MCNC: 6.8 G/DL (ref 6–8.5)
PROT UR QL STRIP: NEGATIVE
RBC # BLD AUTO: 4.17 10*6/MM3 (ref 3.77–5.28)
RESP PATH DNA+RNA PNL NPH NAA+NON-PROBE: NOT DETECTED
RHINOVIRUS RNA SPEC NAA+PROBE: NOT DETECTED
RSV RNA NPH QL NAA+NON-PROBE: NOT DETECTED
SODIUM SERPL-SCNC: 130 MMOL/L (ref 136–145)
SP GR UR STRIP: 1.02 (ref 1–1.03)
TROPONIN T SERPL-MCNC: <0.01 NG/ML (ref 0–0.03)
UROBILINOGEN UR QL STRIP: NORMAL
WBC # BLD AUTO: 6.92 10*3/MM3 (ref 3.4–10.8)

## 2020-08-22 PROCEDURE — 84484 ASSAY OF TROPONIN QUANT: CPT | Performed by: PHYSICIAN ASSISTANT

## 2020-08-22 PROCEDURE — 93010 ELECTROCARDIOGRAM REPORT: CPT | Performed by: INTERNAL MEDICINE

## 2020-08-22 PROCEDURE — 36415 COLL VENOUS BLD VENIPUNCTURE: CPT

## 2020-08-22 PROCEDURE — 25010000002 CEFTRIAXONE PER 250 MG: Performed by: PHYSICIAN ASSISTANT

## 2020-08-22 PROCEDURE — 83880 ASSAY OF NATRIURETIC PEPTIDE: CPT | Performed by: PHYSICIAN ASSISTANT

## 2020-08-22 PROCEDURE — 70450 CT HEAD/BRAIN W/O DYE: CPT

## 2020-08-22 PROCEDURE — 71045 X-RAY EXAM CHEST 1 VIEW: CPT

## 2020-08-22 PROCEDURE — 0202U NFCT DS 22 TRGT SARS-COV-2: CPT | Performed by: PHYSICIAN ASSISTANT

## 2020-08-22 PROCEDURE — 93005 ELECTROCARDIOGRAM TRACING: CPT | Performed by: PHYSICIAN ASSISTANT

## 2020-08-22 PROCEDURE — 85025 COMPLETE CBC W/AUTO DIFF WBC: CPT | Performed by: PHYSICIAN ASSISTANT

## 2020-08-22 PROCEDURE — 81003 URINALYSIS AUTO W/O SCOPE: CPT | Performed by: PHYSICIAN ASSISTANT

## 2020-08-22 PROCEDURE — 25010000002 AZITHROMYCIN PER 500 MG: Performed by: INTERNAL MEDICINE

## 2020-08-22 PROCEDURE — 99284 EMERGENCY DEPT VISIT MOD MDM: CPT

## 2020-08-22 PROCEDURE — 80053 COMPREHEN METABOLIC PANEL: CPT | Performed by: PHYSICIAN ASSISTANT

## 2020-08-22 PROCEDURE — 72125 CT NECK SPINE W/O DYE: CPT

## 2020-08-22 RX ORDER — ACETAMINOPHEN 325 MG/1
650 TABLET ORAL EVERY 6 HOURS PRN
Status: DISCONTINUED | OUTPATIENT
Start: 2020-08-22 | End: 2020-08-29 | Stop reason: HOSPADM

## 2020-08-22 RX ORDER — SODIUM CHLORIDE 9 MG/ML
100 INJECTION, SOLUTION INTRAVENOUS CONTINUOUS
Status: DISCONTINUED | OUTPATIENT
Start: 2020-08-22 | End: 2020-08-29 | Stop reason: HOSPADM

## 2020-08-22 RX ORDER — ACETAMINOPHEN 160 MG/5ML
650 SOLUTION ORAL EVERY 4 HOURS PRN
Status: DISCONTINUED | OUTPATIENT
Start: 2020-08-22 | End: 2020-08-29 | Stop reason: HOSPADM

## 2020-08-22 RX ORDER — SODIUM CHLORIDE 0.9 % (FLUSH) 0.9 %
10 SYRINGE (ML) INJECTION EVERY 12 HOURS SCHEDULED
Status: DISCONTINUED | OUTPATIENT
Start: 2020-08-22 | End: 2020-08-29 | Stop reason: HOSPADM

## 2020-08-22 RX ORDER — CEFTRIAXONE SODIUM 1 G/50ML
1 INJECTION, SOLUTION INTRAVENOUS EVERY 24 HOURS
Status: DISCONTINUED | OUTPATIENT
Start: 2020-08-22 | End: 2020-08-22 | Stop reason: SDUPTHER

## 2020-08-22 RX ORDER — CEFTRIAXONE SODIUM 1 G/50ML
1 INJECTION, SOLUTION INTRAVENOUS ONCE
Status: DISCONTINUED | OUTPATIENT
Start: 2020-08-22 | End: 2020-08-22 | Stop reason: SDUPTHER

## 2020-08-22 RX ORDER — ACETAMINOPHEN 325 MG/1
650 TABLET ORAL EVERY 4 HOURS PRN
Status: DISCONTINUED | OUTPATIENT
Start: 2020-08-22 | End: 2020-08-29 | Stop reason: HOSPADM

## 2020-08-22 RX ORDER — SULFAMETHOXAZOLE AND TRIMETHOPRIM 800; 160 MG/1; MG/1
1 TABLET ORAL 2 TIMES DAILY
COMMUNITY
End: 2020-08-29 | Stop reason: HOSPADM

## 2020-08-22 RX ORDER — CEFTRIAXONE SODIUM 1 G/50ML
1 INJECTION, SOLUTION INTRAVENOUS EVERY 24 HOURS
Status: COMPLETED | OUTPATIENT
Start: 2020-08-23 | End: 2020-08-25

## 2020-08-22 RX ORDER — SODIUM CHLORIDE 0.9 % (FLUSH) 0.9 %
10 SYRINGE (ML) INJECTION AS NEEDED
Status: DISCONTINUED | OUTPATIENT
Start: 2020-08-22 | End: 2020-08-29 | Stop reason: HOSPADM

## 2020-08-22 RX ORDER — ONDANSETRON 2 MG/ML
4 INJECTION INTRAMUSCULAR; INTRAVENOUS EVERY 6 HOURS PRN
Status: DISCONTINUED | OUTPATIENT
Start: 2020-08-22 | End: 2020-08-29 | Stop reason: HOSPADM

## 2020-08-22 RX ORDER — ACETAMINOPHEN 650 MG/1
650 SUPPOSITORY RECTAL EVERY 4 HOURS PRN
Status: DISCONTINUED | OUTPATIENT
Start: 2020-08-22 | End: 2020-08-29 | Stop reason: HOSPADM

## 2020-08-22 RX ORDER — CHOLECALCIFEROL (VITAMIN D3) 125 MCG
1000 CAPSULE ORAL DAILY
Status: DISCONTINUED | OUTPATIENT
Start: 2020-08-22 | End: 2020-08-29 | Stop reason: HOSPADM

## 2020-08-22 RX ADMIN — ACETAMINOPHEN 650 MG: 325 TABLET, FILM COATED ORAL at 20:36

## 2020-08-22 RX ADMIN — Medication 1000 MCG: at 20:21

## 2020-08-22 RX ADMIN — AZITHROMYCIN MONOHYDRATE 500 MG: 500 INJECTION, POWDER, LYOPHILIZED, FOR SOLUTION INTRAVENOUS at 20:22

## 2020-08-22 RX ADMIN — CEFTRIAXONE SODIUM 1 G: 1 INJECTION, SOLUTION INTRAVENOUS at 18:26

## 2020-08-22 RX ADMIN — SODIUM CHLORIDE, PRESERVATIVE FREE 10 ML: 5 INJECTION INTRAVENOUS at 20:21

## 2020-08-22 RX ADMIN — SODIUM CHLORIDE 100 ML/HR: 9 INJECTION, SOLUTION INTRAVENOUS at 20:21

## 2020-08-22 RX ADMIN — SODIUM CHLORIDE 1000 ML: 9 INJECTION, SOLUTION INTRAVENOUS at 18:26

## 2020-08-23 ENCOUNTER — APPOINTMENT (OUTPATIENT)
Dept: MRI IMAGING | Facility: HOSPITAL | Age: 85
End: 2020-08-23

## 2020-08-23 PROBLEM — N17.9 AKI (ACUTE KIDNEY INJURY) (HCC): Status: ACTIVE | Noted: 2020-08-23

## 2020-08-23 PROBLEM — I63.9 ACUTE ISCHEMIC STROKE: Status: ACTIVE | Noted: 2020-08-23

## 2020-08-23 LAB
ANION GAP SERPL CALCULATED.3IONS-SCNC: 11.8 MMOL/L (ref 5–15)
BASOPHILS # BLD AUTO: 0.01 10*3/MM3 (ref 0–0.2)
BASOPHILS NFR BLD AUTO: 0.2 % (ref 0–1.5)
BUN SERPL-MCNC: 18 MG/DL (ref 8–23)
BUN/CREAT SERPL: 16.2 (ref 7–25)
CALCIUM SPEC-SCNC: 8.3 MG/DL (ref 8.6–10.5)
CHLORIDE SERPL-SCNC: 105 MMOL/L (ref 98–107)
CO2 SERPL-SCNC: 20.2 MMOL/L (ref 22–29)
CREAT SERPL-MCNC: 1.11 MG/DL (ref 0.57–1)
DEPRECATED RDW RBC AUTO: 44.4 FL (ref 37–54)
EOSINOPHIL # BLD AUTO: 0.3 10*3/MM3 (ref 0–0.4)
EOSINOPHIL NFR BLD AUTO: 5.6 % (ref 0.3–6.2)
ERYTHROCYTE [DISTWIDTH] IN BLOOD BY AUTOMATED COUNT: 13.4 % (ref 12.3–15.4)
GFR SERPL CREATININE-BSD FRML MDRD: 47 ML/MIN/1.73
GLUCOSE BLDC GLUCOMTR-MCNC: 103 MG/DL (ref 70–130)
GLUCOSE BLDC GLUCOMTR-MCNC: 93 MG/DL (ref 70–130)
GLUCOSE SERPL-MCNC: 96 MG/DL (ref 65–99)
HCT VFR BLD AUTO: 36 % (ref 34–46.6)
HGB BLD-MCNC: 12 G/DL (ref 12–15.9)
IMM GRANULOCYTES # BLD AUTO: 0.03 10*3/MM3 (ref 0–0.05)
IMM GRANULOCYTES NFR BLD AUTO: 0.6 % (ref 0–0.5)
LYMPHOCYTES # BLD AUTO: 0.83 10*3/MM3 (ref 0.7–3.1)
LYMPHOCYTES NFR BLD AUTO: 15.4 % (ref 19.6–45.3)
MAGNESIUM SERPL-MCNC: 2.3 MG/DL (ref 1.6–2.4)
MCH RBC QN AUTO: 30 PG (ref 26.6–33)
MCHC RBC AUTO-ENTMCNC: 33.3 G/DL (ref 31.5–35.7)
MCV RBC AUTO: 90 FL (ref 79–97)
MONOCYTES # BLD AUTO: 0.79 10*3/MM3 (ref 0.1–0.9)
MONOCYTES NFR BLD AUTO: 14.6 % (ref 5–12)
NEUTROPHILS NFR BLD AUTO: 3.44 10*3/MM3 (ref 1.7–7)
NEUTROPHILS NFR BLD AUTO: 63.6 % (ref 42.7–76)
NRBC BLD AUTO-RTO: 0 /100 WBC (ref 0–0.2)
PLATELET # BLD AUTO: 191 10*3/MM3 (ref 140–450)
PMV BLD AUTO: 10.2 FL (ref 6–12)
POTASSIUM SERPL-SCNC: 4 MMOL/L (ref 3.5–5.2)
RBC # BLD AUTO: 4 10*6/MM3 (ref 3.77–5.28)
SODIUM SERPL-SCNC: 137 MMOL/L (ref 136–145)
WBC # BLD AUTO: 5.4 10*3/MM3 (ref 3.4–10.8)

## 2020-08-23 PROCEDURE — 25010000002 CEFTRIAXONE PER 250 MG: Performed by: INTERNAL MEDICINE

## 2020-08-23 PROCEDURE — 97110 THERAPEUTIC EXERCISES: CPT

## 2020-08-23 PROCEDURE — 70551 MRI BRAIN STEM W/O DYE: CPT

## 2020-08-23 PROCEDURE — 85025 COMPLETE CBC W/AUTO DIFF WBC: CPT | Performed by: INTERNAL MEDICINE

## 2020-08-23 PROCEDURE — 80048 BASIC METABOLIC PNL TOTAL CA: CPT | Performed by: INTERNAL MEDICINE

## 2020-08-23 PROCEDURE — 82962 GLUCOSE BLOOD TEST: CPT

## 2020-08-23 PROCEDURE — 97162 PT EVAL MOD COMPLEX 30 MIN: CPT

## 2020-08-23 PROCEDURE — 83735 ASSAY OF MAGNESIUM: CPT | Performed by: INTERNAL MEDICINE

## 2020-08-23 PROCEDURE — 36415 COLL VENOUS BLD VENIPUNCTURE: CPT | Performed by: INTERNAL MEDICINE

## 2020-08-23 RX ORDER — CLONIDINE HYDROCHLORIDE 0.1 MG/1
0.1 TABLET ORAL ONCE
Status: COMPLETED | OUTPATIENT
Start: 2020-08-23 | End: 2020-08-23

## 2020-08-23 RX ORDER — ATORVASTATIN CALCIUM 80 MG/1
80 TABLET, FILM COATED ORAL NIGHTLY
Status: DISCONTINUED | OUTPATIENT
Start: 2020-08-23 | End: 2020-08-29 | Stop reason: HOSPADM

## 2020-08-23 RX ORDER — CLOPIDOGREL BISULFATE 75 MG/1
75 TABLET ORAL DAILY
Status: DISCONTINUED | OUTPATIENT
Start: 2020-08-24 | End: 2020-08-29 | Stop reason: HOSPADM

## 2020-08-23 RX ORDER — ASPIRIN 300 MG/1
300 SUPPOSITORY RECTAL DAILY
Status: DISCONTINUED | OUTPATIENT
Start: 2020-08-23 | End: 2020-08-29 | Stop reason: HOSPADM

## 2020-08-23 RX ORDER — AMLODIPINE BESYLATE 5 MG/1
5 TABLET ORAL
Status: DISCONTINUED | OUTPATIENT
Start: 2020-08-23 | End: 2020-08-29 | Stop reason: HOSPADM

## 2020-08-23 RX ORDER — SODIUM CHLORIDE 0.9 % (FLUSH) 0.9 %
10 SYRINGE (ML) INJECTION EVERY 12 HOURS SCHEDULED
Status: DISCONTINUED | OUTPATIENT
Start: 2020-08-23 | End: 2020-08-23

## 2020-08-23 RX ORDER — ASPIRIN 81 MG/1
81 TABLET, CHEWABLE ORAL DAILY
Status: DISCONTINUED | OUTPATIENT
Start: 2020-08-23 | End: 2020-08-29 | Stop reason: HOSPADM

## 2020-08-23 RX ORDER — SODIUM CHLORIDE 0.9 % (FLUSH) 0.9 %
10 SYRINGE (ML) INJECTION AS NEEDED
Status: DISCONTINUED | OUTPATIENT
Start: 2020-08-23 | End: 2020-08-23

## 2020-08-23 RX ORDER — CLOPIDOGREL BISULFATE 75 MG/1
300 TABLET ORAL ONCE
Status: COMPLETED | OUTPATIENT
Start: 2020-08-23 | End: 2020-08-23

## 2020-08-23 RX ADMIN — ATORVASTATIN CALCIUM 80 MG: 80 TABLET, FILM COATED ORAL at 20:08

## 2020-08-23 RX ADMIN — SODIUM CHLORIDE 100 ML/HR: 9 INJECTION, SOLUTION INTRAVENOUS at 08:10

## 2020-08-23 RX ADMIN — ASPIRIN 81 MG: 81 TABLET, CHEWABLE ORAL at 16:04

## 2020-08-23 RX ADMIN — CLONIDINE HYDROCHLORIDE 0.1 MG: 0.1 TABLET ORAL at 01:15

## 2020-08-23 RX ADMIN — SODIUM CHLORIDE, PRESERVATIVE FREE 10 ML: 5 INJECTION INTRAVENOUS at 08:10

## 2020-08-23 RX ADMIN — SODIUM CHLORIDE, PRESERVATIVE FREE 10 ML: 5 INJECTION INTRAVENOUS at 20:08

## 2020-08-23 RX ADMIN — CLOPIDOGREL 300 MG: 75 TABLET, FILM COATED ORAL at 20:08

## 2020-08-23 RX ADMIN — AMLODIPINE BESYLATE 5 MG: 5 TABLET ORAL at 16:04

## 2020-08-23 RX ADMIN — ACETAMINOPHEN 650 MG: 325 TABLET, FILM COATED ORAL at 20:08

## 2020-08-23 RX ADMIN — ACETAMINOPHEN 650 MG: 325 TABLET, FILM COATED ORAL at 06:06

## 2020-08-23 RX ADMIN — Medication 1000 MCG: at 08:10

## 2020-08-23 RX ADMIN — CEFTRIAXONE SODIUM 1 G: 1 INJECTION, SOLUTION INTRAVENOUS at 18:47

## 2020-08-24 ENCOUNTER — TELEPHONE (OUTPATIENT)
Dept: FAMILY MEDICINE CLINIC | Facility: CLINIC | Age: 85
End: 2020-08-24

## 2020-08-24 ENCOUNTER — APPOINTMENT (OUTPATIENT)
Dept: CARDIOLOGY | Facility: HOSPITAL | Age: 85
End: 2020-08-24

## 2020-08-24 ENCOUNTER — APPOINTMENT (OUTPATIENT)
Dept: MRI IMAGING | Facility: HOSPITAL | Age: 85
End: 2020-08-24

## 2020-08-24 LAB
ANION GAP SERPL CALCULATED.3IONS-SCNC: 9.9 MMOL/L (ref 5–15)
BH CV ECHO MEAS - ACS: 1.8 CM
BH CV ECHO MEAS - AO MAX PG (FULL): 1.7 MMHG
BH CV ECHO MEAS - AO MAX PG: 11.8 MMHG
BH CV ECHO MEAS - AO MEAN PG (FULL): 0 MMHG
BH CV ECHO MEAS - AO MEAN PG: 6 MMHG
BH CV ECHO MEAS - AO ROOT AREA (BSA CORRECTED): 1.7
BH CV ECHO MEAS - AO ROOT AREA: 6.2 CM^2
BH CV ECHO MEAS - AO ROOT DIAM: 2.8 CM
BH CV ECHO MEAS - AO V2 MAX: 172 CM/SEC
BH CV ECHO MEAS - AO V2 MEAN: 117 CM/SEC
BH CV ECHO MEAS - AO V2 VTI: 30.8 CM
BH CV ECHO MEAS - ASC AORTA: 2.8 CM
BH CV ECHO MEAS - AVA(I,A): 3 CM^2
BH CV ECHO MEAS - AVA(I,D): 3 CM^2
BH CV ECHO MEAS - AVA(V,A): 2.9 CM^2
BH CV ECHO MEAS - AVA(V,D): 2.9 CM^2
BH CV ECHO MEAS - BSA(HAYCOCK): 1.7 M^2
BH CV ECHO MEAS - BSA: 1.6 M^2
BH CV ECHO MEAS - BZI_BMI: 24.1 KILOGRAMS/M^2
BH CV ECHO MEAS - BZI_METRIC_HEIGHT: 160 CM
BH CV ECHO MEAS - BZI_METRIC_WEIGHT: 61.7 KG
BH CV ECHO MEAS - EDV(CUBED): 54.9 ML
BH CV ECHO MEAS - EDV(MOD-SP2): 43 ML
BH CV ECHO MEAS - EDV(MOD-SP4): 62 ML
BH CV ECHO MEAS - EDV(TEICH): 62 ML
BH CV ECHO MEAS - EF(CUBED): 85.4 %
BH CV ECHO MEAS - EF(MOD-BP): 62 %
BH CV ECHO MEAS - EF(MOD-SP2): 58.1 %
BH CV ECHO MEAS - EF(MOD-SP4): 64.5 %
BH CV ECHO MEAS - EF(TEICH): 79.5 %
BH CV ECHO MEAS - ESV(CUBED): 8 ML
BH CV ECHO MEAS - ESV(MOD-SP2): 18 ML
BH CV ECHO MEAS - ESV(MOD-SP4): 22 ML
BH CV ECHO MEAS - ESV(TEICH): 12.7 ML
BH CV ECHO MEAS - FS: 47.4 %
BH CV ECHO MEAS - IVS/LVPW: 1.2
BH CV ECHO MEAS - IVSD: 1.3 CM
BH CV ECHO MEAS - LAT PEAK E' VEL: 8.1 CM/SEC
BH CV ECHO MEAS - LV DIASTOLIC VOL/BSA (35-75): 37.8 ML/M^2
BH CV ECHO MEAS - LV MASS(C)D: 153.2 GRAMS
BH CV ECHO MEAS - LV MASS(C)DI: 93.4 GRAMS/M^2
BH CV ECHO MEAS - LV MAX PG: 10.1 MMHG
BH CV ECHO MEAS - LV MEAN PG: 6 MMHG
BH CV ECHO MEAS - LV SYSTOLIC VOL/BSA (12-30): 13.4 ML/M^2
BH CV ECHO MEAS - LV V1 MAX: 159 CM/SEC
BH CV ECHO MEAS - LV V1 MEAN: 118 CM/SEC
BH CV ECHO MEAS - LV V1 VTI: 29.5 CM
BH CV ECHO MEAS - LVIDD: 3.8 CM
BH CV ECHO MEAS - LVIDS: 2 CM
BH CV ECHO MEAS - LVLD AP2: 6.7 CM
BH CV ECHO MEAS - LVLD AP4: 6.9 CM
BH CV ECHO MEAS - LVLS AP2: 4.9 CM
BH CV ECHO MEAS - LVLS AP4: 6.3 CM
BH CV ECHO MEAS - LVOT AREA (M): 3.1 CM^2
BH CV ECHO MEAS - LVOT AREA: 3.1 CM^2
BH CV ECHO MEAS - LVOT DIAM: 2 CM
BH CV ECHO MEAS - LVPWD: 1.1 CM
BH CV ECHO MEAS - MED PEAK E' VEL: 12.2 CM/SEC
BH CV ECHO MEAS - MV A DUR: 0.15 SEC
BH CV ECHO MEAS - MV A MAX VEL: 107 CM/SEC
BH CV ECHO MEAS - MV DEC SLOPE: 256 CM/SEC^2
BH CV ECHO MEAS - MV DEC TIME: 0.19 SEC
BH CV ECHO MEAS - MV E MAX VEL: 59.1 CM/SEC
BH CV ECHO MEAS - MV E/A: 0.55
BH CV ECHO MEAS - MV MAX PG: 6.7 MMHG
BH CV ECHO MEAS - MV MEAN PG: 3 MMHG
BH CV ECHO MEAS - MV P1/2T MAX VEL: 69 CM/SEC
BH CV ECHO MEAS - MV P1/2T: 78.9 MSEC
BH CV ECHO MEAS - MV V2 MAX: 129 CM/SEC
BH CV ECHO MEAS - MV V2 MEAN: 72.7 CM/SEC
BH CV ECHO MEAS - MV V2 VTI: 20.9 CM
BH CV ECHO MEAS - MVA P1/2T LCG: 3.2 CM^2
BH CV ECHO MEAS - MVA(P1/2T): 2.8 CM^2
BH CV ECHO MEAS - MVA(VTI): 4.4 CM^2
BH CV ECHO MEAS - PA ACC TIME: 0.1 SEC
BH CV ECHO MEAS - PA MAX PG (FULL): 2.9 MMHG
BH CV ECHO MEAS - PA MAX PG: 4.4 MMHG
BH CV ECHO MEAS - PA PR(ACCEL): 32.7 MMHG
BH CV ECHO MEAS - PA V2 MAX: 105 CM/SEC
BH CV ECHO MEAS - PI END-D VEL: 99.1 CM/SEC
BH CV ECHO MEAS - PULM A REVS DUR: 0.15 SEC
BH CV ECHO MEAS - PULM A REVS VEL: 23.6 CM/SEC
BH CV ECHO MEAS - PULM DIAS VEL: 29.1 CM/SEC
BH CV ECHO MEAS - PULM S/D: 1.7
BH CV ECHO MEAS - PULM SYS VEL: 48.4 CM/SEC
BH CV ECHO MEAS - PVA(V,A): 2.2 CM^2
BH CV ECHO MEAS - PVA(V,D): 2.2 CM^2
BH CV ECHO MEAS - QP/QS: 0.51
BH CV ECHO MEAS - RAP SYSTOLE: 3 MMHG
BH CV ECHO MEAS - RV MAX PG: 1.5 MMHG
BH CV ECHO MEAS - RV MEAN PG: 1 MMHG
BH CV ECHO MEAS - RV V1 MAX: 60.7 CM/SEC
BH CV ECHO MEAS - RV V1 MEAN: 45.3 CM/SEC
BH CV ECHO MEAS - RV V1 VTI: 12.5 CM
BH CV ECHO MEAS - RVOT AREA: 3.8 CM^2
BH CV ECHO MEAS - RVOT DIAM: 2.2 CM
BH CV ECHO MEAS - RVSP: 38.3 MMHG
BH CV ECHO MEAS - SI(AO): 115.5 ML/M^2
BH CV ECHO MEAS - SI(CUBED): 28.6 ML/M^2
BH CV ECHO MEAS - SI(LVOT): 56.5 ML/M^2
BH CV ECHO MEAS - SI(MOD-SP2): 15.2 ML/M^2
BH CV ECHO MEAS - SI(MOD-SP4): 24.4 ML/M^2
BH CV ECHO MEAS - SI(TEICH): 30 ML/M^2
BH CV ECHO MEAS - SV(AO): 189.7 ML
BH CV ECHO MEAS - SV(CUBED): 46.9 ML
BH CV ECHO MEAS - SV(LVOT): 92.7 ML
BH CV ECHO MEAS - SV(MOD-SP2): 25 ML
BH CV ECHO MEAS - SV(MOD-SP4): 40 ML
BH CV ECHO MEAS - SV(RVOT): 47.5 ML
BH CV ECHO MEAS - SV(TEICH): 49.2 ML
BH CV ECHO MEAS - TAPSE (>1.6): 2 CM2
BH CV ECHO MEAS - TR MAX VEL: 297 CM/SEC
BH CV ECHO MEASUREMENTS AVERAGE E/E' RATIO: 5.82
BH CV XLRA - RV BASE: 3.4 CM
BH CV XLRA - RV LENGTH: 4.6 CM
BH CV XLRA - RV MID: 2.4 CM
BH CV XLRA - TDI S': 16.2 CM/SEC
BH CV XLRA MEAS LEFT CCA RATIO VEL: 58.4 CM/SEC
BH CV XLRA MEAS LEFT DIST CCA EDV: 15.7 CM/SEC
BH CV XLRA MEAS LEFT DIST CCA PSV: 58.4 CM/SEC
BH CV XLRA MEAS LEFT DIST ICA EDV: -18.5 CM/SEC
BH CV XLRA MEAS LEFT DIST ICA PSV: -52.7 CM/SEC
BH CV XLRA MEAS LEFT ICA RATIO VEL: -52.7 CM/SEC
BH CV XLRA MEAS LEFT ICA/CCA RATIO: -0.9
BH CV XLRA MEAS LEFT MID ICA EDV: -18.2 CM/SEC
BH CV XLRA MEAS LEFT MID ICA PSV: -51.7 CM/SEC
BH CV XLRA MEAS LEFT PROX CCA EDV: -18.7 CM/SEC
BH CV XLRA MEAS LEFT PROX CCA PSV: -68.6 CM/SEC
BH CV XLRA MEAS LEFT PROX ECA EDV: 11.8 CM/SEC
BH CV XLRA MEAS LEFT PROX ECA PSV: 54.5 CM/SEC
BH CV XLRA MEAS LEFT PROX ICA EDV: -12.1 CM/SEC
BH CV XLRA MEAS LEFT PROX ICA PSV: -42.2 CM/SEC
BH CV XLRA MEAS LEFT PROX SCLA PSV: 106.7 CM/SEC
BH CV XLRA MEAS LEFT VERTEBRAL A EDV: -10.4 CM/SEC
BH CV XLRA MEAS LEFT VERTEBRAL A PSV: -36.9 CM/SEC
BH CV XLRA MEAS RIGHT CCA RATIO VEL: 45.2 CM/SEC
BH CV XLRA MEAS RIGHT DIST CCA EDV: 12.5 CM/SEC
BH CV XLRA MEAS RIGHT DIST CCA PSV: 45.2 CM/SEC
BH CV XLRA MEAS RIGHT DIST ICA EDV: -17.9 CM/SEC
BH CV XLRA MEAS RIGHT DIST ICA PSV: -58.8 CM/SEC
BH CV XLRA MEAS RIGHT ICA RATIO VEL: -58.8 CM/SEC
BH CV XLRA MEAS RIGHT ICA/CCA RATIO: -1.3
BH CV XLRA MEAS RIGHT MID ICA EDV: -16.1 CM/SEC
BH CV XLRA MEAS RIGHT MID ICA PSV: -51.8 CM/SEC
BH CV XLRA MEAS RIGHT PROX CCA EDV: 14.9 CM/SEC
BH CV XLRA MEAS RIGHT PROX CCA PSV: 61.1 CM/SEC
BH CV XLRA MEAS RIGHT PROX ECA EDV: -9.7 CM/SEC
BH CV XLRA MEAS RIGHT PROX ECA PSV: -63.2 CM/SEC
BH CV XLRA MEAS RIGHT PROX ICA EDV: -15.1 CM/SEC
BH CV XLRA MEAS RIGHT PROX ICA PSV: -47.3 CM/SEC
BH CV XLRA MEAS RIGHT PROX SCLA PSV: 84.2 CM/SEC
BH CV XLRA MEAS RIGHT VERTEBRAL A EDV: -11.5 CM/SEC
BH CV XLRA MEAS RIGHT VERTEBRAL A PSV: -37.4 CM/SEC
BUN SERPL-MCNC: 16 MG/DL (ref 8–23)
BUN/CREAT SERPL: 20.5 (ref 7–25)
C DIFF TOX GENS STL QL NAA+PROBE: NEGATIVE
CALCIUM SPEC-SCNC: 9.4 MG/DL (ref 8.6–10.5)
CHLORIDE SERPL-SCNC: 104 MMOL/L (ref 98–107)
CHOLEST SERPL-MCNC: 169 MG/DL (ref 0–200)
CO2 SERPL-SCNC: 21.1 MMOL/L (ref 22–29)
CREAT SERPL-MCNC: 0.78 MG/DL (ref 0.57–1)
GFR SERPL CREATININE-BSD FRML MDRD: 70 ML/MIN/1.73
GLUCOSE BLDC GLUCOMTR-MCNC: 97 MG/DL (ref 70–130)
GLUCOSE SERPL-MCNC: 108 MG/DL (ref 65–99)
HBA1C MFR BLD: 6.1 % (ref 4.8–5.6)
HDLC SERPL-MCNC: 44 MG/DL (ref 40–60)
LDLC SERPL CALC-MCNC: 105 MG/DL (ref 0–100)
LDLC/HDLC SERPL: 2.38 {RATIO}
LEFT ARM BP: NORMAL MMHG
LEFT ATRIUM VOLUME INDEX: 22 ML/M2
MAXIMAL PREDICTED HEART RATE: 135 BPM
POTASSIUM SERPL-SCNC: 4 MMOL/L (ref 3.5–5.2)
RIGHT ARM BP: NORMAL MMHG
SODIUM SERPL-SCNC: 135 MMOL/L (ref 136–145)
STRESS TARGET HR: 115 BPM
TRIGL SERPL-MCNC: 101 MG/DL (ref 0–150)
VLDLC SERPL-MCNC: 20.2 MG/DL (ref 5–40)

## 2020-08-24 PROCEDURE — 80061 LIPID PANEL: CPT | Performed by: INTERNAL MEDICINE

## 2020-08-24 PROCEDURE — 25010000002 HALOPERIDOL LACTATE PER 5 MG: Performed by: HOSPITALIST

## 2020-08-24 PROCEDURE — 99222 1ST HOSP IP/OBS MODERATE 55: CPT | Performed by: PSYCHIATRY & NEUROLOGY

## 2020-08-24 PROCEDURE — 93306 TTE W/DOPPLER COMPLETE: CPT | Performed by: INTERNAL MEDICINE

## 2020-08-24 PROCEDURE — 93306 TTE W/DOPPLER COMPLETE: CPT

## 2020-08-24 PROCEDURE — 25010000002 PERFLUTREN (DEFINITY) 8.476 MG IN SODIUM CHLORIDE (PF) 0.9 % 10 ML INJECTION: Performed by: INTERNAL MEDICINE

## 2020-08-24 PROCEDURE — 92610 EVALUATE SWALLOWING FUNCTION: CPT

## 2020-08-24 PROCEDURE — 25010000002 CEFTRIAXONE PER 250 MG: Performed by: INTERNAL MEDICINE

## 2020-08-24 PROCEDURE — 82962 GLUCOSE BLOOD TEST: CPT

## 2020-08-24 PROCEDURE — 97110 THERAPEUTIC EXERCISES: CPT

## 2020-08-24 PROCEDURE — 93880 EXTRACRANIAL BILAT STUDY: CPT

## 2020-08-24 PROCEDURE — 97166 OT EVAL MOD COMPLEX 45 MIN: CPT

## 2020-08-24 PROCEDURE — 87493 C DIFF AMPLIFIED PROBE: CPT | Performed by: NURSE PRACTITIONER

## 2020-08-24 PROCEDURE — 83036 HEMOGLOBIN GLYCOSYLATED A1C: CPT | Performed by: INTERNAL MEDICINE

## 2020-08-24 PROCEDURE — 25010000002 HALOPERIDOL LACTATE PER 5 MG: Performed by: NURSE PRACTITIONER

## 2020-08-24 PROCEDURE — 80048 BASIC METABOLIC PNL TOTAL CA: CPT | Performed by: INTERNAL MEDICINE

## 2020-08-24 PROCEDURE — 97535 SELF CARE MNGMENT TRAINING: CPT

## 2020-08-24 RX ORDER — HALOPERIDOL 5 MG/ML
1 INJECTION INTRAMUSCULAR ONCE
Status: COMPLETED | OUTPATIENT
Start: 2020-08-24 | End: 2020-08-24

## 2020-08-24 RX ORDER — DIPHENOXYLATE HYDROCHLORIDE AND ATROPINE SULFATE 2.5; .025 MG/1; MG/1
1 TABLET ORAL 4 TIMES DAILY PRN
Status: DISCONTINUED | OUTPATIENT
Start: 2020-08-24 | End: 2020-08-29 | Stop reason: HOSPADM

## 2020-08-24 RX ORDER — AMLODIPINE BESYLATE 5 MG/1
5 TABLET ORAL ONCE
Status: DISCONTINUED | OUTPATIENT
Start: 2020-08-24 | End: 2020-08-29 | Stop reason: HOSPADM

## 2020-08-24 RX ORDER — LOPERAMIDE HYDROCHLORIDE 2 MG/1
2 CAPSULE ORAL 4 TIMES DAILY PRN
Status: DISCONTINUED | OUTPATIENT
Start: 2020-08-24 | End: 2020-08-29 | Stop reason: HOSPADM

## 2020-08-24 RX ORDER — HALOPERIDOL 5 MG/ML
3 INJECTION INTRAMUSCULAR ONCE
Status: COMPLETED | OUTPATIENT
Start: 2020-08-24 | End: 2020-08-24

## 2020-08-24 RX ADMIN — HALOPERIDOL LACTATE 3 MG: 5 INJECTION, SOLUTION INTRAMUSCULAR at 23:08

## 2020-08-24 RX ADMIN — SODIUM CHLORIDE 100 ML/HR: 9 INJECTION, SOLUTION INTRAVENOUS at 13:59

## 2020-08-24 RX ADMIN — AMLODIPINE BESYLATE 5 MG: 5 TABLET ORAL at 08:57

## 2020-08-24 RX ADMIN — ASPIRIN 81 MG: 81 TABLET, CHEWABLE ORAL at 08:57

## 2020-08-24 RX ADMIN — SODIUM CHLORIDE, PRESERVATIVE FREE 10 ML: 5 INJECTION INTRAVENOUS at 21:14

## 2020-08-24 RX ADMIN — HALOPERIDOL LACTATE 1 MG: 5 INJECTION, SOLUTION INTRAMUSCULAR at 02:13

## 2020-08-24 RX ADMIN — PERFLUTREN 2 ML: 6.52 INJECTION, SUSPENSION INTRAVENOUS at 12:30

## 2020-08-24 RX ADMIN — ATORVASTATIN CALCIUM 80 MG: 80 TABLET, FILM COATED ORAL at 21:14

## 2020-08-24 RX ADMIN — CLOPIDOGREL 75 MG: 75 TABLET, FILM COATED ORAL at 08:57

## 2020-08-24 RX ADMIN — Medication 1000 MCG: at 08:57

## 2020-08-24 RX ADMIN — CEFTRIAXONE SODIUM 1 G: 1 INJECTION, SOLUTION INTRAVENOUS at 21:14

## 2020-08-24 RX ADMIN — HALOPERIDOL LACTATE 1 MG: 5 INJECTION, SOLUTION INTRAMUSCULAR at 06:34

## 2020-08-24 NOTE — TELEPHONE ENCOUNTER
Pt daughter called scheduling to state that pt went to ER and is now admitted at [facility] and they have done the imaging on her head and neck already. Order will be canceled.

## 2020-08-24 NOTE — TELEPHONE ENCOUNTER
[facility] would like Haleigh Velazquez MD to be aware that they have accepted patient's referral, but were unable to start due to low staffing. [facility] can start either tomorrow or Wednesday with pt's therapy.

## 2020-08-25 LAB
ANION GAP SERPL CALCULATED.3IONS-SCNC: 11.7 MMOL/L (ref 5–15)
BASOPHILS # BLD AUTO: 0.01 10*3/MM3 (ref 0–0.2)
BASOPHILS NFR BLD AUTO: 0.1 % (ref 0–1.5)
BUN SERPL-MCNC: 18 MG/DL (ref 8–23)
BUN/CREAT SERPL: 21.2 (ref 7–25)
CALCIUM SPEC-SCNC: 9.5 MG/DL (ref 8.6–10.5)
CHLORIDE SERPL-SCNC: 103 MMOL/L (ref 98–107)
CO2 SERPL-SCNC: 21.3 MMOL/L (ref 22–29)
CREAT SERPL-MCNC: 0.85 MG/DL (ref 0.57–1)
DEPRECATED RDW RBC AUTO: 46.2 FL (ref 37–54)
EOSINOPHIL # BLD AUTO: 0.15 10*3/MM3 (ref 0–0.4)
EOSINOPHIL NFR BLD AUTO: 1.8 % (ref 0.3–6.2)
ERYTHROCYTE [DISTWIDTH] IN BLOOD BY AUTOMATED COUNT: 13.9 % (ref 12.3–15.4)
GFR SERPL CREATININE-BSD FRML MDRD: 64 ML/MIN/1.73
GLUCOSE SERPL-MCNC: 129 MG/DL (ref 65–99)
HBV SURFACE AG SERPL QL IA: NORMAL
HCT VFR BLD AUTO: 40.6 % (ref 34–46.6)
HCV AB SER DONR QL: NORMAL
HGB BLD-MCNC: 13.4 G/DL (ref 12–15.9)
HIV1+2 AB SER QL: NORMAL
HOLD SPECIMEN: NORMAL
IMM GRANULOCYTES # BLD AUTO: 0.04 10*3/MM3 (ref 0–0.05)
IMM GRANULOCYTES NFR BLD AUTO: 0.5 % (ref 0–0.5)
LYMPHOCYTES # BLD AUTO: 1.34 10*3/MM3 (ref 0.7–3.1)
LYMPHOCYTES NFR BLD AUTO: 15.7 % (ref 19.6–45.3)
MCH RBC QN AUTO: 29.9 PG (ref 26.6–33)
MCHC RBC AUTO-ENTMCNC: 33 G/DL (ref 31.5–35.7)
MCV RBC AUTO: 90.6 FL (ref 79–97)
MONOCYTES # BLD AUTO: 0.59 10*3/MM3 (ref 0.1–0.9)
MONOCYTES NFR BLD AUTO: 6.9 % (ref 5–12)
NEUTROPHILS NFR BLD AUTO: 6.41 10*3/MM3 (ref 1.7–7)
NEUTROPHILS NFR BLD AUTO: 75 % (ref 42.7–76)
NRBC BLD AUTO-RTO: 0 /100 WBC (ref 0–0.2)
PLATELET # BLD AUTO: 235 10*3/MM3 (ref 140–450)
PMV BLD AUTO: 9.8 FL (ref 6–12)
POTASSIUM SERPL-SCNC: 3.9 MMOL/L (ref 3.5–5.2)
RBC # BLD AUTO: 4.48 10*6/MM3 (ref 3.77–5.28)
SODIUM SERPL-SCNC: 136 MMOL/L (ref 136–145)
WBC # BLD AUTO: 8.54 10*3/MM3 (ref 3.4–10.8)

## 2020-08-25 PROCEDURE — 80048 BASIC METABOLIC PNL TOTAL CA: CPT | Performed by: HOSPITALIST

## 2020-08-25 PROCEDURE — G0432 EIA HIV-1/HIV-2 SCREEN: HCPCS | Performed by: HOSPITALIST

## 2020-08-25 PROCEDURE — 93005 ELECTROCARDIOGRAM TRACING: CPT | Performed by: HOSPITALIST

## 2020-08-25 PROCEDURE — 87340 HEPATITIS B SURFACE AG IA: CPT | Performed by: HOSPITALIST

## 2020-08-25 PROCEDURE — 87521 HEPATITIS C PROBE&RVRS TRNSC: CPT | Performed by: HOSPITALIST

## 2020-08-25 PROCEDURE — 93010 ELECTROCARDIOGRAM REPORT: CPT | Performed by: INTERNAL MEDICINE

## 2020-08-25 PROCEDURE — 85025 COMPLETE CBC W/AUTO DIFF WBC: CPT | Performed by: HOSPITALIST

## 2020-08-25 PROCEDURE — 86803 HEPATITIS C AB TEST: CPT | Performed by: HOSPITALIST

## 2020-08-25 PROCEDURE — 25010000002 CEFTRIAXONE PER 250 MG: Performed by: INTERNAL MEDICINE

## 2020-08-25 RX ADMIN — ASPIRIN 81 MG: 81 TABLET, CHEWABLE ORAL at 08:22

## 2020-08-25 RX ADMIN — CEFTRIAXONE SODIUM 1 G: 1 INJECTION, SOLUTION INTRAVENOUS at 17:25

## 2020-08-25 RX ADMIN — LOPERAMIDE HYDROCHLORIDE 2 MG: 2 CAPSULE ORAL at 01:03

## 2020-08-25 RX ADMIN — SODIUM CHLORIDE, PRESERVATIVE FREE 10 ML: 5 INJECTION INTRAVENOUS at 20:10

## 2020-08-25 RX ADMIN — Medication 1000 MCG: at 08:22

## 2020-08-25 RX ADMIN — CLOPIDOGREL 75 MG: 75 TABLET, FILM COATED ORAL at 08:22

## 2020-08-25 RX ADMIN — AMLODIPINE BESYLATE 5 MG: 5 TABLET ORAL at 08:22

## 2020-08-25 RX ADMIN — DIPHENOXYLATE HYDROCHLORIDE AND ATROPINE SULFATE 1 TABLET: 2.5; .025 TABLET ORAL at 01:03

## 2020-08-25 RX ADMIN — ATORVASTATIN CALCIUM 80 MG: 80 TABLET, FILM COATED ORAL at 20:09

## 2020-08-25 RX ADMIN — SODIUM CHLORIDE, PRESERVATIVE FREE 10 ML: 5 INJECTION INTRAVENOUS at 08:22

## 2020-08-26 LAB
ANION GAP SERPL CALCULATED.3IONS-SCNC: 9.7 MMOL/L (ref 5–15)
BASOPHILS # BLD AUTO: 0.03 10*3/MM3 (ref 0–0.2)
BASOPHILS NFR BLD AUTO: 0.4 % (ref 0–1.5)
BUN SERPL-MCNC: 17 MG/DL (ref 8–23)
BUN/CREAT SERPL: 26.6 (ref 7–25)
CALCIUM SPEC-SCNC: 9.2 MG/DL (ref 8.6–10.5)
CHLORIDE SERPL-SCNC: 107 MMOL/L (ref 98–107)
CO2 SERPL-SCNC: 22.3 MMOL/L (ref 22–29)
CREAT SERPL-MCNC: 0.64 MG/DL (ref 0.57–1)
DEPRECATED RDW RBC AUTO: 43.8 FL (ref 37–54)
EOSINOPHIL # BLD AUTO: 0.14 10*3/MM3 (ref 0–0.4)
EOSINOPHIL NFR BLD AUTO: 1.6 % (ref 0.3–6.2)
ERYTHROCYTE [DISTWIDTH] IN BLOOD BY AUTOMATED COUNT: 13.7 % (ref 12.3–15.4)
GFR SERPL CREATININE-BSD FRML MDRD: 88 ML/MIN/1.73
GLUCOSE SERPL-MCNC: 105 MG/DL (ref 65–99)
HCT VFR BLD AUTO: 36.2 % (ref 34–46.6)
HGB BLD-MCNC: 12.4 G/DL (ref 12–15.9)
IMM GRANULOCYTES # BLD AUTO: 0.02 10*3/MM3 (ref 0–0.05)
IMM GRANULOCYTES NFR BLD AUTO: 0.2 % (ref 0–0.5)
LYMPHOCYTES # BLD AUTO: 1.52 10*3/MM3 (ref 0.7–3.1)
LYMPHOCYTES NFR BLD AUTO: 17.8 % (ref 19.6–45.3)
MCH RBC QN AUTO: 30.3 PG (ref 26.6–33)
MCHC RBC AUTO-ENTMCNC: 34.3 G/DL (ref 31.5–35.7)
MCV RBC AUTO: 88.5 FL (ref 79–97)
MONOCYTES # BLD AUTO: 0.51 10*3/MM3 (ref 0.1–0.9)
MONOCYTES NFR BLD AUTO: 6 % (ref 5–12)
NEUTROPHILS NFR BLD AUTO: 6.3 10*3/MM3 (ref 1.7–7)
NEUTROPHILS NFR BLD AUTO: 74 % (ref 42.7–76)
NRBC BLD AUTO-RTO: 0 /100 WBC (ref 0–0.2)
PLATELET # BLD AUTO: 212 10*3/MM3 (ref 140–450)
PMV BLD AUTO: 9.5 FL (ref 6–12)
POTASSIUM SERPL-SCNC: 3.8 MMOL/L (ref 3.5–5.2)
RBC # BLD AUTO: 4.09 10*6/MM3 (ref 3.77–5.28)
SODIUM SERPL-SCNC: 139 MMOL/L (ref 136–145)
WBC # BLD AUTO: 8.52 10*3/MM3 (ref 3.4–10.8)

## 2020-08-26 PROCEDURE — 25010000002 HALOPERIDOL LACTATE PER 5 MG: Performed by: NURSE PRACTITIONER

## 2020-08-26 PROCEDURE — 85025 COMPLETE CBC W/AUTO DIFF WBC: CPT | Performed by: HOSPITALIST

## 2020-08-26 PROCEDURE — 92526 ORAL FUNCTION THERAPY: CPT

## 2020-08-26 PROCEDURE — 80048 BASIC METABOLIC PNL TOTAL CA: CPT | Performed by: HOSPITALIST

## 2020-08-26 PROCEDURE — 97110 THERAPEUTIC EXERCISES: CPT

## 2020-08-26 RX ORDER — HALOPERIDOL 5 MG/ML
1 INJECTION INTRAMUSCULAR ONCE
Status: COMPLETED | OUTPATIENT
Start: 2020-08-26 | End: 2020-08-26

## 2020-08-26 RX ORDER — QUETIAPINE FUMARATE 25 MG/1
25 TABLET, FILM COATED ORAL NIGHTLY
Status: DISCONTINUED | OUTPATIENT
Start: 2020-08-26 | End: 2020-08-28

## 2020-08-26 RX ADMIN — QUETIAPINE FUMARATE 25 MG: 25 TABLET ORAL at 22:01

## 2020-08-26 RX ADMIN — HALOPERIDOL LACTATE 1 MG: 5 INJECTION, SOLUTION INTRAMUSCULAR at 01:05

## 2020-08-26 RX ADMIN — ACETAMINOPHEN 650 MG: 325 TABLET, FILM COATED ORAL at 13:54

## 2020-08-26 RX ADMIN — SODIUM CHLORIDE, PRESERVATIVE FREE 10 ML: 5 INJECTION INTRAVENOUS at 09:20

## 2020-08-26 RX ADMIN — SODIUM CHLORIDE 100 ML/HR: 9 INJECTION, SOLUTION INTRAVENOUS at 01:04

## 2020-08-26 RX ADMIN — SODIUM CHLORIDE 100 ML/HR: 9 INJECTION, SOLUTION INTRAVENOUS at 21:53

## 2020-08-26 RX ADMIN — Medication 1000 MCG: at 09:20

## 2020-08-26 RX ADMIN — CLOPIDOGREL 75 MG: 75 TABLET, FILM COATED ORAL at 09:20

## 2020-08-26 RX ADMIN — AMLODIPINE BESYLATE 5 MG: 5 TABLET ORAL at 09:20

## 2020-08-26 RX ADMIN — ATORVASTATIN CALCIUM 80 MG: 80 TABLET, FILM COATED ORAL at 22:01

## 2020-08-26 RX ADMIN — SODIUM CHLORIDE 100 ML/HR: 9 INJECTION, SOLUTION INTRAVENOUS at 11:12

## 2020-08-26 RX ADMIN — ASPIRIN 81 MG: 81 TABLET, CHEWABLE ORAL at 09:20

## 2020-08-27 LAB
ANION GAP SERPL CALCULATED.3IONS-SCNC: 10.7 MMOL/L (ref 5–15)
BASOPHILS # BLD AUTO: 0.05 10*3/MM3 (ref 0–0.2)
BASOPHILS NFR BLD AUTO: 0.6 % (ref 0–1.5)
BUN SERPL-MCNC: 14 MG/DL (ref 8–23)
BUN/CREAT SERPL: 19.7 (ref 7–25)
CALCIUM SPEC-SCNC: 9.1 MG/DL (ref 8.6–10.5)
CHLORIDE SERPL-SCNC: 105 MMOL/L (ref 98–107)
CO2 SERPL-SCNC: 22.3 MMOL/L (ref 22–29)
CREAT SERPL-MCNC: 0.71 MG/DL (ref 0.57–1)
DEPRECATED RDW RBC AUTO: 44.7 FL (ref 37–54)
EOSINOPHIL # BLD AUTO: 0.24 10*3/MM3 (ref 0–0.4)
EOSINOPHIL NFR BLD AUTO: 2.7 % (ref 0.3–6.2)
ERYTHROCYTE [DISTWIDTH] IN BLOOD BY AUTOMATED COUNT: 13.5 % (ref 12.3–15.4)
GFR SERPL CREATININE-BSD FRML MDRD: 78 ML/MIN/1.73
GLUCOSE SERPL-MCNC: 86 MG/DL (ref 65–99)
HCT VFR BLD AUTO: 38.2 % (ref 34–46.6)
HEPATITIS C RNA-NAA: NEGATIVE
HGB BLD-MCNC: 13 G/DL (ref 12–15.9)
IMM GRANULOCYTES # BLD AUTO: 0.03 10*3/MM3 (ref 0–0.05)
IMM GRANULOCYTES NFR BLD AUTO: 0.3 % (ref 0–0.5)
LYMPHOCYTES # BLD AUTO: 2.18 10*3/MM3 (ref 0.7–3.1)
LYMPHOCYTES NFR BLD AUTO: 24.9 % (ref 19.6–45.3)
MCH RBC QN AUTO: 30.6 PG (ref 26.6–33)
MCHC RBC AUTO-ENTMCNC: 34 G/DL (ref 31.5–35.7)
MCV RBC AUTO: 89.9 FL (ref 79–97)
MONOCYTES # BLD AUTO: 0.68 10*3/MM3 (ref 0.1–0.9)
MONOCYTES NFR BLD AUTO: 7.8 % (ref 5–12)
NEUTROPHILS NFR BLD AUTO: 5.58 10*3/MM3 (ref 1.7–7)
NEUTROPHILS NFR BLD AUTO: 63.7 % (ref 42.7–76)
NRBC BLD AUTO-RTO: 0 /100 WBC (ref 0–0.2)
PLATELET # BLD AUTO: 206 10*3/MM3 (ref 140–450)
PMV BLD AUTO: 9.5 FL (ref 6–12)
POTASSIUM SERPL-SCNC: 3.6 MMOL/L (ref 3.5–5.2)
RBC # BLD AUTO: 4.25 10*6/MM3 (ref 3.77–5.28)
SODIUM SERPL-SCNC: 138 MMOL/L (ref 136–145)
WBC # BLD AUTO: 8.76 10*3/MM3 (ref 3.4–10.8)

## 2020-08-27 PROCEDURE — 85025 COMPLETE CBC W/AUTO DIFF WBC: CPT | Performed by: HOSPITALIST

## 2020-08-27 PROCEDURE — 80048 BASIC METABOLIC PNL TOTAL CA: CPT | Performed by: HOSPITALIST

## 2020-08-27 PROCEDURE — 97110 THERAPEUTIC EXERCISES: CPT

## 2020-08-27 RX ADMIN — ASPIRIN 81 MG: 81 TABLET, CHEWABLE ORAL at 08:58

## 2020-08-27 RX ADMIN — AMLODIPINE BESYLATE 5 MG: 5 TABLET ORAL at 08:59

## 2020-08-27 RX ADMIN — SODIUM CHLORIDE, PRESERVATIVE FREE 10 ML: 5 INJECTION INTRAVENOUS at 20:36

## 2020-08-27 RX ADMIN — Medication 1000 MCG: at 08:58

## 2020-08-27 RX ADMIN — SODIUM CHLORIDE, PRESERVATIVE FREE 10 ML: 5 INJECTION INTRAVENOUS at 08:59

## 2020-08-27 RX ADMIN — CLOPIDOGREL 75 MG: 75 TABLET, FILM COATED ORAL at 08:58

## 2020-08-27 RX ADMIN — ATORVASTATIN CALCIUM 80 MG: 80 TABLET, FILM COATED ORAL at 20:36

## 2020-08-27 RX ADMIN — SODIUM CHLORIDE 100 ML/HR: 9 INJECTION, SOLUTION INTRAVENOUS at 17:15

## 2020-08-27 RX ADMIN — QUETIAPINE FUMARATE 25 MG: 25 TABLET ORAL at 20:36

## 2020-08-28 LAB
ANION GAP SERPL CALCULATED.3IONS-SCNC: 8 MMOL/L (ref 5–15)
B PARAPERT DNA SPEC QL NAA+PROBE: NOT DETECTED
B PERT DNA SPEC QL NAA+PROBE: NOT DETECTED
BASOPHILS # BLD AUTO: 0.03 10*3/MM3 (ref 0–0.2)
BASOPHILS NFR BLD AUTO: 0.4 % (ref 0–1.5)
BUN SERPL-MCNC: 19 MG/DL (ref 8–23)
BUN/CREAT SERPL: 26.8 (ref 7–25)
C PNEUM DNA NPH QL NAA+NON-PROBE: NOT DETECTED
CALCIUM SPEC-SCNC: 9 MG/DL (ref 8.6–10.5)
CHLORIDE SERPL-SCNC: 109 MMOL/L (ref 98–107)
CO2 SERPL-SCNC: 24 MMOL/L (ref 22–29)
CREAT SERPL-MCNC: 0.71 MG/DL (ref 0.57–1)
DEPRECATED RDW RBC AUTO: 45 FL (ref 37–54)
EOSINOPHIL # BLD AUTO: 0.28 10*3/MM3 (ref 0–0.4)
EOSINOPHIL NFR BLD AUTO: 3.5 % (ref 0.3–6.2)
ERYTHROCYTE [DISTWIDTH] IN BLOOD BY AUTOMATED COUNT: 13.7 % (ref 12.3–15.4)
FLUAV H1 2009 PAND RNA NPH QL NAA+PROBE: NOT DETECTED
FLUAV H1 HA GENE NPH QL NAA+PROBE: NOT DETECTED
FLUAV H3 RNA NPH QL NAA+PROBE: NOT DETECTED
FLUAV SUBTYP SPEC NAA+PROBE: NOT DETECTED
FLUBV RNA ISLT QL NAA+PROBE: NOT DETECTED
GFR SERPL CREATININE-BSD FRML MDRD: 78 ML/MIN/1.73
GLUCOSE SERPL-MCNC: 97 MG/DL (ref 65–99)
HADV DNA SPEC NAA+PROBE: NOT DETECTED
HCOV 229E RNA SPEC QL NAA+PROBE: NOT DETECTED
HCOV HKU1 RNA SPEC QL NAA+PROBE: NOT DETECTED
HCOV NL63 RNA SPEC QL NAA+PROBE: NOT DETECTED
HCOV OC43 RNA SPEC QL NAA+PROBE: NOT DETECTED
HCT VFR BLD AUTO: 35.9 % (ref 34–46.6)
HGB BLD-MCNC: 12.1 G/DL (ref 12–15.9)
HMPV RNA NPH QL NAA+NON-PROBE: NOT DETECTED
HPIV1 RNA SPEC QL NAA+PROBE: NOT DETECTED
HPIV2 RNA SPEC QL NAA+PROBE: NOT DETECTED
HPIV3 RNA NPH QL NAA+PROBE: NOT DETECTED
HPIV4 P GENE NPH QL NAA+PROBE: NOT DETECTED
IMM GRANULOCYTES # BLD AUTO: 0.03 10*3/MM3 (ref 0–0.05)
IMM GRANULOCYTES NFR BLD AUTO: 0.4 % (ref 0–0.5)
LYMPHOCYTES # BLD AUTO: 2.25 10*3/MM3 (ref 0.7–3.1)
LYMPHOCYTES NFR BLD AUTO: 28.2 % (ref 19.6–45.3)
M PNEUMO IGG SER IA-ACNC: NOT DETECTED
MCH RBC QN AUTO: 30.1 PG (ref 26.6–33)
MCHC RBC AUTO-ENTMCNC: 33.7 G/DL (ref 31.5–35.7)
MCV RBC AUTO: 89.3 FL (ref 79–97)
MONOCYTES # BLD AUTO: 0.75 10*3/MM3 (ref 0.1–0.9)
MONOCYTES NFR BLD AUTO: 9.4 % (ref 5–12)
NEUTROPHILS NFR BLD AUTO: 4.64 10*3/MM3 (ref 1.7–7)
NEUTROPHILS NFR BLD AUTO: 58.1 % (ref 42.7–76)
NRBC BLD AUTO-RTO: 0 /100 WBC (ref 0–0.2)
PLATELET # BLD AUTO: 193 10*3/MM3 (ref 140–450)
PMV BLD AUTO: 9.6 FL (ref 6–12)
POTASSIUM SERPL-SCNC: 3.5 MMOL/L (ref 3.5–5.2)
POTASSIUM SERPL-SCNC: 5.2 MMOL/L (ref 3.5–5.2)
RBC # BLD AUTO: 4.02 10*6/MM3 (ref 3.77–5.28)
RHINOVIRUS RNA SPEC NAA+PROBE: NOT DETECTED
RSV RNA NPH QL NAA+NON-PROBE: NOT DETECTED
SARS-COV-2 RNA NPH QL NAA+NON-PROBE: NOT DETECTED
SODIUM SERPL-SCNC: 141 MMOL/L (ref 136–145)
WBC # BLD AUTO: 7.98 10*3/MM3 (ref 3.4–10.8)

## 2020-08-28 PROCEDURE — 0202U NFCT DS 22 TRGT SARS-COV-2: CPT | Performed by: HOSPITALIST

## 2020-08-28 PROCEDURE — 97110 THERAPEUTIC EXERCISES: CPT

## 2020-08-28 PROCEDURE — 85025 COMPLETE CBC W/AUTO DIFF WBC: CPT | Performed by: HOSPITALIST

## 2020-08-28 PROCEDURE — 84132 ASSAY OF SERUM POTASSIUM: CPT | Performed by: HOSPITALIST

## 2020-08-28 PROCEDURE — 80048 BASIC METABOLIC PNL TOTAL CA: CPT | Performed by: HOSPITALIST

## 2020-08-28 RX ORDER — POTASSIUM CHLORIDE 7.45 MG/ML
10 INJECTION INTRAVENOUS
Status: DISCONTINUED | OUTPATIENT
Start: 2020-08-28 | End: 2020-08-29 | Stop reason: HOSPADM

## 2020-08-28 RX ORDER — POTASSIUM CHLORIDE 1.5 G/1.77G
40 POWDER, FOR SOLUTION ORAL AS NEEDED
Status: DISCONTINUED | OUTPATIENT
Start: 2020-08-28 | End: 2020-08-29 | Stop reason: HOSPADM

## 2020-08-28 RX ORDER — POTASSIUM CHLORIDE 750 MG/1
40 CAPSULE, EXTENDED RELEASE ORAL AS NEEDED
Status: DISCONTINUED | OUTPATIENT
Start: 2020-08-28 | End: 2020-08-29 | Stop reason: HOSPADM

## 2020-08-28 RX ORDER — QUETIAPINE FUMARATE 25 MG/1
12.5 TABLET, FILM COATED ORAL NIGHTLY
Status: DISCONTINUED | OUTPATIENT
Start: 2020-08-28 | End: 2020-08-29 | Stop reason: HOSPADM

## 2020-08-28 RX ADMIN — QUETIAPINE FUMARATE 12.5 MG: 25 TABLET ORAL at 20:47

## 2020-08-28 RX ADMIN — CLOPIDOGREL 75 MG: 75 TABLET, FILM COATED ORAL at 09:09

## 2020-08-28 RX ADMIN — Medication 1000 MCG: at 09:09

## 2020-08-28 RX ADMIN — SODIUM CHLORIDE 500 ML: 9 INJECTION, SOLUTION INTRAVENOUS at 08:47

## 2020-08-28 RX ADMIN — SODIUM CHLORIDE 100 ML/HR: 9 INJECTION, SOLUTION INTRAVENOUS at 12:07

## 2020-08-28 RX ADMIN — SODIUM CHLORIDE, PRESERVATIVE FREE 10 ML: 5 INJECTION INTRAVENOUS at 20:48

## 2020-08-28 RX ADMIN — POTASSIUM CHLORIDE 40 MEQ: 750 CAPSULE, EXTENDED RELEASE ORAL at 12:58

## 2020-08-28 RX ADMIN — ASPIRIN 81 MG: 81 TABLET, CHEWABLE ORAL at 09:09

## 2020-08-28 RX ADMIN — ATORVASTATIN CALCIUM 80 MG: 80 TABLET, FILM COATED ORAL at 20:47

## 2020-08-28 RX ADMIN — POTASSIUM CHLORIDE 40 MEQ: 750 CAPSULE, EXTENDED RELEASE ORAL at 09:09

## 2020-08-28 RX ADMIN — DIPHENOXYLATE HYDROCHLORIDE AND ATROPINE SULFATE 1 TABLET: 2.5; .025 TABLET ORAL at 09:10

## 2020-08-29 VITALS
WEIGHT: 140.21 LBS | HEIGHT: 63 IN | BODY MASS INDEX: 24.84 KG/M2 | RESPIRATION RATE: 17 BRPM | SYSTOLIC BLOOD PRESSURE: 145 MMHG | OXYGEN SATURATION: 90 % | TEMPERATURE: 98.2 F | DIASTOLIC BLOOD PRESSURE: 86 MMHG | HEART RATE: 94 BPM

## 2020-08-29 LAB
ANION GAP SERPL CALCULATED.3IONS-SCNC: 10 MMOL/L (ref 5–15)
BACTERIA UR QL AUTO: ABNORMAL /HPF
BASOPHILS # BLD AUTO: 0.05 10*3/MM3 (ref 0–0.2)
BASOPHILS NFR BLD AUTO: 0.5 % (ref 0–1.5)
BILIRUB UR QL STRIP: NEGATIVE
BUN SERPL-MCNC: 17 MG/DL (ref 8–23)
BUN/CREAT SERPL: 22.1 (ref 7–25)
CALCIUM SPEC-SCNC: 9.3 MG/DL (ref 8.6–10.5)
CHLORIDE SERPL-SCNC: 104 MMOL/L (ref 98–107)
CLARITY UR: CLEAR
CO2 SERPL-SCNC: 24 MMOL/L (ref 22–29)
COLOR UR: YELLOW
CREAT SERPL-MCNC: 0.77 MG/DL (ref 0.57–1)
DEPRECATED RDW RBC AUTO: 44.2 FL (ref 37–54)
EOSINOPHIL # BLD AUTO: 0.32 10*3/MM3 (ref 0–0.4)
EOSINOPHIL NFR BLD AUTO: 3.4 % (ref 0.3–6.2)
ERYTHROCYTE [DISTWIDTH] IN BLOOD BY AUTOMATED COUNT: 13.6 % (ref 12.3–15.4)
GFR SERPL CREATININE-BSD FRML MDRD: 71 ML/MIN/1.73
GLUCOSE SERPL-MCNC: 95 MG/DL (ref 65–99)
GLUCOSE UR STRIP-MCNC: NEGATIVE MG/DL
HCT VFR BLD AUTO: 38.1 % (ref 34–46.6)
HGB BLD-MCNC: 12.7 G/DL (ref 12–15.9)
HGB UR QL STRIP.AUTO: ABNORMAL
HYALINE CASTS UR QL AUTO: ABNORMAL /LPF
IMM GRANULOCYTES # BLD AUTO: 0.04 10*3/MM3 (ref 0–0.05)
IMM GRANULOCYTES NFR BLD AUTO: 0.4 % (ref 0–0.5)
KETONES UR QL STRIP: NEGATIVE
LEUKOCYTE ESTERASE UR QL STRIP.AUTO: ABNORMAL
LYMPHOCYTES # BLD AUTO: 2.11 10*3/MM3 (ref 0.7–3.1)
LYMPHOCYTES NFR BLD AUTO: 22.7 % (ref 19.6–45.3)
MCH RBC QN AUTO: 29.6 PG (ref 26.6–33)
MCHC RBC AUTO-ENTMCNC: 33.3 G/DL (ref 31.5–35.7)
MCV RBC AUTO: 88.8 FL (ref 79–97)
MONOCYTES # BLD AUTO: 0.81 10*3/MM3 (ref 0.1–0.9)
MONOCYTES NFR BLD AUTO: 8.7 % (ref 5–12)
NEUTROPHILS NFR BLD AUTO: 5.96 10*3/MM3 (ref 1.7–7)
NEUTROPHILS NFR BLD AUTO: 64.3 % (ref 42.7–76)
NITRITE UR QL STRIP: NEGATIVE
NRBC BLD AUTO-RTO: 0 /100 WBC (ref 0–0.2)
PH UR STRIP.AUTO: 6 [PH] (ref 5–8)
PLATELET # BLD AUTO: 188 10*3/MM3 (ref 140–450)
PMV BLD AUTO: 9.3 FL (ref 6–12)
POTASSIUM SERPL-SCNC: 4.2 MMOL/L (ref 3.5–5.2)
PROT UR QL STRIP: NEGATIVE
RBC # BLD AUTO: 4.29 10*6/MM3 (ref 3.77–5.28)
RBC # UR: ABNORMAL /HPF
REF LAB TEST METHOD: ABNORMAL
SODIUM SERPL-SCNC: 138 MMOL/L (ref 136–145)
SP GR UR STRIP: 1.01 (ref 1–1.03)
SQUAMOUS #/AREA URNS HPF: ABNORMAL /HPF
UROBILINOGEN UR QL STRIP: ABNORMAL
WBC # BLD AUTO: 9.29 10*3/MM3 (ref 3.4–10.8)
WBC UR QL AUTO: ABNORMAL /HPF

## 2020-08-29 PROCEDURE — 81001 URINALYSIS AUTO W/SCOPE: CPT | Performed by: HOSPITALIST

## 2020-08-29 PROCEDURE — 85025 COMPLETE CBC W/AUTO DIFF WBC: CPT | Performed by: HOSPITALIST

## 2020-08-29 PROCEDURE — 80048 BASIC METABOLIC PNL TOTAL CA: CPT | Performed by: HOSPITALIST

## 2020-08-29 PROCEDURE — 87086 URINE CULTURE/COLONY COUNT: CPT | Performed by: HOSPITALIST

## 2020-08-29 RX ORDER — CLOPIDOGREL BISULFATE 75 MG/1
75 TABLET ORAL DAILY
Qty: 15 TABLET | Refills: 0
Start: 2020-08-30 | End: 2020-09-14

## 2020-08-29 RX ORDER — ASPIRIN 81 MG/1
81 TABLET, CHEWABLE ORAL DAILY
Start: 2020-08-30

## 2020-08-29 RX ORDER — LOPERAMIDE HYDROCHLORIDE 2 MG/1
2 CAPSULE ORAL 4 TIMES DAILY PRN
Start: 2020-08-29

## 2020-08-29 RX ORDER — AMLODIPINE BESYLATE 5 MG/1
5 TABLET ORAL
Start: 2020-08-30 | End: 2020-10-21 | Stop reason: SDUPTHER

## 2020-08-29 RX ORDER — ATORVASTATIN CALCIUM 80 MG/1
80 TABLET, FILM COATED ORAL NIGHTLY
Start: 2020-08-29 | End: 2020-10-21 | Stop reason: SDUPTHER

## 2020-08-29 RX ORDER — QUETIAPINE FUMARATE 25 MG/1
12.5 TABLET, FILM COATED ORAL NIGHTLY
Start: 2020-08-29 | End: 2020-10-13

## 2020-08-29 RX ADMIN — CLOPIDOGREL 75 MG: 75 TABLET, FILM COATED ORAL at 08:08

## 2020-08-29 RX ADMIN — SODIUM CHLORIDE 100 ML/HR: 9 INJECTION, SOLUTION INTRAVENOUS at 00:34

## 2020-08-29 RX ADMIN — AMLODIPINE BESYLATE 5 MG: 5 TABLET ORAL at 08:08

## 2020-08-29 RX ADMIN — SODIUM CHLORIDE, PRESERVATIVE FREE 10 ML: 5 INJECTION INTRAVENOUS at 08:08

## 2020-08-29 RX ADMIN — Medication 1000 MCG: at 08:08

## 2020-08-29 RX ADMIN — ASPIRIN 81 MG: 81 TABLET, CHEWABLE ORAL at 08:08

## 2020-08-30 LAB — BACTERIA SPEC AEROBE CULT: ABNORMAL

## 2020-08-31 ENCOUNTER — EPISODE CHANGES (OUTPATIENT)
Dept: CASE MANAGEMENT | Facility: OTHER | Age: 85
End: 2020-08-31

## 2020-09-24 ENCOUNTER — PATIENT OUTREACH (OUTPATIENT)
Dept: CASE MANAGEMENT | Facility: OTHER | Age: 85
End: 2020-09-24

## 2020-09-24 NOTE — OUTREACH NOTE
SNF Follow-up Note      Responses   Acute Facility Discharged From  [facility]   Acute Discharge Date  08/29/20   Name of the Skilled Nursing Facility?  [facility]   Purpose of SNF Admission  PT, OT, SN   Who is the insurance provider or payor of patient stay?  Medicare   Progression of Patient?  progressing   Skilled Nursing Discharge Date?  09/25/20      Spoke with Magalys at [facility]. Patient scheduled to discharge 9/25/2020. AC scheduled follow up call for 9/28/2020.     Marli Wilson RN  Ambulatory     9/24/2020, 11:38 EDT

## 2020-09-27 ENCOUNTER — READMISSION MANAGEMENT (OUTPATIENT)
Dept: CALL CENTER | Facility: HOSPITAL | Age: 85
End: 2020-09-27

## 2020-09-27 NOTE — OUTREACH NOTE
Prep Survey      Responses   [facility] facility patient discharged from?  Non-BH   Is LACE score < 7 ?  Non- Discharge   Eligibility  [facility]   Date of Admission  08/29/20   Date of Discharge  09/25/20   Discharge Disposition  Home or Self Care   Discharge diagnosis  Acute ischemic stroke    Does the patient have one of the following disease processes/diagnoses(primary or secondary)?  Stroke (TIA)   Prep survey completed?  Yes          Patricia Boone RN

## 2020-09-28 ENCOUNTER — TRANSITIONAL CARE MANAGEMENT TELEPHONE ENCOUNTER (OUTPATIENT)
Dept: CALL CENTER | Facility: HOSPITAL | Age: 85
End: 2020-09-28

## 2020-09-28 NOTE — OUTREACH NOTE
Call Center TCM Note      Responses   [facility] patient discharged from?  Non-   COVID-19 Test Status  Not tested   Does the patient have one of the following disease processes/diagnoses(primary or secondary)?  Stroke (TIA)   TCM attempt successful?  Yes   Call start time  1848   Call end time  1850   Discharge diagnosis  Acute ischemic stroke    Person spoke with today (if not patient) and relationship  Daughter   Is the patient taking all medications as directed (includes completed medication regime)?  Yes   Does the patient have a primary care provider?   Yes   Does the patient have an appointment with their PCP within 7 days of discharge?  Greater than 7 days   What is preventing the patient from scheduling follow up appointments within 7 days of discharge?  Earlier appointment not available   Has the patient kept scheduled appointments due by today?  N/A   Has home health visited the patient within 72 hours of discharge?  N/A   Pulse Ox monitoring  None   What is the patient's perception of their health status since discharge?  Improving   Is the patient/caregiver able to teach back signs and symptoms related to disease process for when to call PCP?  Yes   Is the patient/caregiver able to teach back signs and symptoms related to disease process for when to call 911?  Yes   Is the patient/caregiver able to teach back the hierarchy of who to call/visit for symptoms/problems? PCP, Specialist, Home health nurse, Urgent Care, ED, 911  Yes   Additional teach back comments  Daughter states she is doing ok but having some memory issues.  States her brother stays with her most of the time.  They have a pill box to set up her medications for morning and night time.     TCM call completed?  Yes   Wrap up additional comments  Denies any needs or questions at this time          Faviola Montanez LPN    9/28/2020, 18:54 EDT

## 2020-09-28 NOTE — OUTREACH NOTE
Call Center TCM Note      Responses   [facility] patient discharged from?  Non-   COVID-19 Test Status  Not tested   Does the patient have one of the following disease processes/diagnoses(primary or secondary)?  Stroke (TIA)   TCM attempt successful?  No   Unsuccessful attempts  Attempt 1          Faviola Montanez LPN    9/28/2020, 13:54 EDT

## 2020-09-29 ENCOUNTER — PATIENT OUTREACH (OUTPATIENT)
Dept: CASE MANAGEMENT | Facility: OTHER | Age: 85
End: 2020-09-29

## 2020-09-29 NOTE — PATIENT INSTRUCTIONS
[email or web address]&utm_source=anthony&utm_medium=cpc&utm_campaign=(AUDREY)%20Shopping-%20All%20Products&utm_term=[id number]&utm_content=All%20Products&adlclid=[id number]          [email or web address]?[email or web address]&wucbui=[email or web address]&hvbmt=be&hvdev=c&hvqmt=e&keywords=bathroom+handrails&zbm=[id number]&sr=8-30&tag=yw0m-92               Medical Alert Options:       Alert 9 [phone] 19.95/mo     MobileHelp [phone] 19.95/mo (60 days free)    One Call Alert [phone] 19.95/mo (2 months free)    LifeFone [phone] 19.95/mo (1 mo free + free activation)    Medical Alert [phone] 19.95/mo (1 mo free)

## 2020-09-29 NOTE — OUTREACH NOTE
Care Plan Note      Responses   Lifestyle Goals  Decrease falls risk, Fewer ER/urgent care visits, Medication management, Routine follow-up with doctor(s), Self monitor blood pressure   Barriers  Difficulty w/ medication administration, Other (See Comment) [Forgetfulness]   Self Management  Educational materials provided, Home BP Monitoring, Daily Journaling, Medication Adherence [grab bars for bathroom and medical alert information]   Annual Wellness Visit:   Patient Has Completed   Specific Disease Process Teaching  -- [Falls prevention and Medication management. ]   Advanced Directives:  Patient Has   Ed Visits past 12 months:  1   Hospitalizations past 12 months  1   Discharge destination:  Home   Medication Adherence  Other (see comment) [Family filling mediplanner. Patient being checked frequently for correct administration. ]   Goal Progress  Making Progress Toward Goal(s)   Readiness Scale  6   Confidence Scale  6   Health Literacy  Good [spoke with patient's daughter]   Patient Outreach Summary (AVS)  Send Outreach Summary        The main concerns and/or symptoms the patient would like to address are: Spoke with patient's daughter regarding patient's transition from [facility]. Patient is doing well. Patient has son staying with her at this time. Patient's daughter assists with management of patient's medications and health care needs. Patient not interested in Medical Alert services at this time. Patient's daughter requested information regarding some options if patient changes her mind. Patient also in need of grab bars in bathroom. Patient takes baths and may need assistance standing. .    Education/instruction provided by Care Coordinator: Introduced self, explained ACM RN role and provided contact information. Reviewed completed follow up appointment with Dr. Velazquez. [facility] scheduled to visit patient today. Patient has son staying with her at this time. Patient's daughter is ensuring  patient's medi-planner is filled and is being taken correctly at this time. Reviewed grab bar options that do not require drilling into the tile. Reviewed Medical Alert services. Sent summary to patient's daughter via mail and to [employer] for review of information.     Follow Up Outreach Due: 2 weeks    Marli Wilson RN  Ambulatory     9/29/2020, 13:22 EDT

## 2020-09-29 NOTE — OUTREACH NOTE
SNF Follow-up Note      Responses   Acute Facility Discharged From  [facility]   Acute Discharge Date  08/29/20   Name of the Skilled Nursing Facility?  [facility]   Purpose of SNF Admission  PT, OT, SN   Who is the insurance provider or payor of patient stay?  Medicare   Progression of Patient?  discharged   Skilled Nursing Discharge Date?  09/25/20   Where was the patient discharged to?  Home   Home Health Agency at discharge?  Yes   Name of Home Health Agency?  [facility]   DME Needed at Discharge?  Yes   What Medical Equipment is needed?  bathroom grab bars and possible medical alert   Are there any medication concerns at Discharge  Yes [Forgetfulness. Family filling planner and checking frequently for patient's accurate cocmpliance. ]   What are the Concerns?  Medication compliance due to forgetfulness   Does the patient have a follow-up appointment?  Yes   Comments Regarding F/U Appt. ?  Completed with Dr. Caroline Wilson, RN  Ambulatory     9/29/2020, 13:27 EDT

## 2020-10-02 ENCOUNTER — TELEPHONE (OUTPATIENT)
Dept: FAMILY MEDICINE CLINIC | Facility: CLINIC | Age: 85
End: 2020-10-02

## 2020-10-02 NOTE — TELEPHONE ENCOUNTER
ASHELY CALLED FROM [facility] REQUESTING VERBAL ORDERS    PLEASE CALL ASHELY: [phone]

## 2020-10-12 ENCOUNTER — OFFICE VISIT (OUTPATIENT)
Dept: FAMILY MEDICINE CLINIC | Facility: CLINIC | Age: 85
End: 2020-10-12

## 2020-10-12 VITALS
SYSTOLIC BLOOD PRESSURE: 142 MMHG | TEMPERATURE: 98 F | DIASTOLIC BLOOD PRESSURE: 76 MMHG | HEART RATE: 75 BPM | OXYGEN SATURATION: 97 % | HEIGHT: 63 IN | WEIGHT: 138.1 LBS | BODY MASS INDEX: 24.47 KG/M2

## 2020-10-12 DIAGNOSIS — N30.00 ACUTE CYSTITIS WITHOUT HEMATURIA: ICD-10-CM

## 2020-10-12 DIAGNOSIS — Z23 NEED FOR IMMUNIZATION AGAINST INFLUENZA: ICD-10-CM

## 2020-10-12 DIAGNOSIS — I10 ESSENTIAL HYPERTENSION: Primary | ICD-10-CM

## 2020-10-12 LAB
BILIRUB BLD-MCNC: NEGATIVE MG/DL
CLARITY, POC: CLEAR
COLOR UR: YELLOW
GLUCOSE UR STRIP-MCNC: NEGATIVE MG/DL
KETONES UR QL: NEGATIVE
LEUKOCYTE EST, POC: ABNORMAL
NITRITE UR-MCNC: NEGATIVE MG/ML
PH UR: 5.5 [PH] (ref 5–8)
PROT UR STRIP-MCNC: NEGATIVE MG/DL
RBC # UR STRIP: ABNORMAL /UL
SP GR UR: 1.02 (ref 1–1.03)
UROBILINOGEN UR QL: NORMAL

## 2020-10-12 PROCEDURE — 99214 OFFICE O/P EST MOD 30 MIN: CPT | Performed by: FAMILY MEDICINE

## 2020-10-12 PROCEDURE — 90694 VACC AIIV4 NO PRSRV 0.5ML IM: CPT | Performed by: FAMILY MEDICINE

## 2020-10-12 PROCEDURE — G0008 ADMIN INFLUENZA VIRUS VAC: HCPCS | Performed by: FAMILY MEDICINE

## 2020-10-12 PROCEDURE — 81003 URINALYSIS AUTO W/O SCOPE: CPT | Performed by: FAMILY MEDICINE

## 2020-10-12 RX ORDER — CLOPIDOGREL BISULFATE 75 MG/1
75 TABLET ORAL DAILY
COMMUNITY
End: 2020-10-21 | Stop reason: SDUPTHER

## 2020-10-12 RX ORDER — MELATONIN
1000 DAILY
COMMUNITY

## 2020-10-12 NOTE — PROGRESS NOTES
Allison Fitzpatrick is a 85 y.o. female.     Chief Complaint   Patient presents with   • Hypertension     no complains pt were in hospital a month ago due to mini stroke  i wore mas and face shield        HPI     Pt is a pleasant 85 y.o. YO female here for HTN and recent fall, she has been released from PT/OT and nursing. .      HTN: controlled.  Chronic problem for years, asymptomatic, no chest pain, palpitation or SOA.  Adherent with medications.  No adverse effects from medications.    Recurrent UTI, had 2 UTIs, no fever, some lower back pain. No dysuria right now.       The following portions of the patient's history were reviewed and updated as appropriate: allergies, current medications, past family history, past medical history, past social history, past surgical history and problem list.    Review of Systems   Constitutional: Positive for fatigue. Negative for chills, fever and unexpected weight change.   HENT: Negative for ear pain, hearing loss, sinus pressure, sore throat and tinnitus.    Eyes: Negative for pain, discharge and redness.   Respiratory: Negative for cough, shortness of breath and wheezing.    Cardiovascular: Positive for leg swelling. Negative for chest pain and palpitations.   Gastrointestinal: Negative for abdominal pain, constipation, diarrhea and nausea.   Endocrine: Negative for cold intolerance and heat intolerance.   Genitourinary: Negative for difficulty urinating, flank pain and urgency.   Musculoskeletal: Positive for arthralgias and myalgias. Negative for back pain and joint swelling.   Skin: Negative for rash and wound.   Allergic/Immunologic: Negative for environmental allergies and food allergies.   Neurological: Positive for weakness. Negative for dizziness, seizures, numbness and headaches.   Hematological: Negative for adenopathy. Does not bruise/bleed easily.   Psychiatric/Behavioral: Negative for decreased concentration, dysphoric mood and sleep disturbance. The patient  is not nervous/anxious.    All other systems reviewed and are negative.      Objective  Vitals:    10/12/20 1250   BP: 142/76   Pulse: 75   Temp: 98 °F (36.7 °C)   SpO2: 97%        Physical Exam  Vitals signs and nursing note reviewed.   Constitutional:       General: She is not in acute distress.     Appearance: She is well-developed.   HENT:      Head: Normocephalic.      Nose: Nose normal.   Cardiovascular:      Rate and Rhythm: Normal rate and regular rhythm.      Heart sounds: Normal heart sounds. No murmur.   Pulmonary:      Effort: Pulmonary effort is normal. No respiratory distress.      Breath sounds: Normal breath sounds.   Musculoskeletal: Normal range of motion.      Comments: No pain with palpation of the lower spine   Skin:     General: Skin is warm and dry.      Findings: No rash.   Neurological:      Mental Status: She is alert and oriented to person, place, and time.   Psychiatric:         Behavior: Behavior normal.         Thought Content: Thought content normal.         Judgment: Judgment normal.           Current Outpatient Medications:   •  acetaminophen (TYLENOL) 650 MG 8 hr tablet, Take 650 mg by mouth Every 8 (Eight) Hours As Needed for Mild Pain ., Disp: , Rfl:   •  amLODIPine (NORVASC) 5 MG tablet, Take 1 tablet by mouth Daily., Disp: , Rfl:   •  aspirin 81 MG chewable tablet, Chew 1 tablet Daily., Disp: , Rfl:   •  atorvastatin (LIPITOR) 80 MG tablet, Take 1 tablet by mouth Every Night., Disp: , Rfl:   •  cholecalciferol (VITAMIN D3) 25 MCG (1000 UT) tablet, Take 1,000 Units by mouth Daily., Disp: , Rfl:   •  clopidogrel (PLAVIX) 75 MG tablet, Take 75 mg by mouth Daily., Disp: , Rfl:   •  loperamide (IMODIUM) 2 MG capsule, Take 1 capsule by mouth 4 (Four) Times a Day As Needed for Diarrhea., Disp: , Rfl:   •  vitamin B-12 (CYANOCOBALAMIN) 1000 MCG tablet, Take 1,000 mcg by mouth Daily., Disp: , Rfl:     Procedures    Lab Results (most recent)     None        Office Visit on 10/12/2020    Component Date Value Ref Range Status   • Color 10/12/2020 Yellow  Yellow, Straw, Dark Yellow, Hiral Final   • Clarity, UA 10/12/2020 Clear  Clear Final   • Specific Gravity  10/12/2020 1.020  1.005 - 1.030 Final   • pH, Urine 10/12/2020 5.5  5.0 - 8.0 Final   • Leukocytes 10/12/2020 Trace* Negative Final   • Nitrite, UA 10/12/2020 Negative  Negative Final   • Protein, POC 10/12/2020 Negative  Negative mg/dL Final   • Glucose, UA 10/12/2020 Negative  Negative, 1000 mg/dL (3+) mg/dL Final   • Ketones, UA 10/12/2020 Negative  Negative Final   • Urobilinogen, UA 10/12/2020 Normal  Normal Final   • Bilirubin 10/12/2020 Negative  Negative Final   • Blood, UA 10/12/2020 Trace* Negative Final           Allison was seen today for hypertension.    Diagnoses and all orders for this visit:    Essential hypertension    Acute cystitis without hematuria  -     POC Urinalysis Dipstick, Automated  -     Urine Culture - Urine, Urine, Clean Catch    Need for immunization against influenza  -     Fluad Quad >65 years    HTN well controlled, continue current meds.  Advised her to increase protein intake.  Discussed diet and maintaining physical therapy exercises.  She has a couple more sessions left.    Acute cystitis, he has had 2 rounds of UTIs in the last month 1 with Candida and 1 with a bacterial infection.  Asymptomatic but having some lower back pain which I think is musculoskeletal.  However we will get a repeat culture.    Flu shot given as above.    30 minutes was spent of the 35 minute visit in direct face to face counseling and coordination of care regarding: Extensive time spent with patient trying to understand history.  Encounter Diagnoses   Name Primary?   • Acute cystitis without hematuria    • Essential hypertension Yes   • Need for immunization against influenza          Return if symptoms worsen or fail to improve.      Haleigh Velazquez MD    Mask and protective eyewear worn during entire patient encounter.

## 2020-10-13 ENCOUNTER — PATIENT OUTREACH (OUTPATIENT)
Dept: CASE MANAGEMENT | Facility: OTHER | Age: 85
End: 2020-10-13

## 2020-10-13 NOTE — OUTREACH NOTE
Patient Outreach Note    Spoke with patient's daughter, Maura. [facility] information regarding hand rails and life alert systems were received. A  for the side of the tub was chosen to protect the patient's tile and allow for a grab bar when she rises from her baths. Life alert systems are still being investigated. Patient does not want a monthly fee. Patient's granddaughter is looking into an alert system that would only be a one-time fee. No further needs identified at this time. Daughter appreciative of the outreach.     Marli Wilson RN  Ambulatory     10/13/2020, 17:00 EDT

## 2020-10-15 LAB
BACTERIA UR CULT: NORMAL
BACTERIA UR CULT: NORMAL

## 2020-10-20 ENCOUNTER — TELEPHONE (OUTPATIENT)
Dept: FAMILY MEDICINE CLINIC | Facility: CLINIC | Age: 85
End: 2020-10-20

## 2020-10-20 NOTE — PROGRESS NOTES
Please call patient back with results.  The urine culture has resulted as negative for UTI.  Please follow-up if not improving..    Thank you

## 2020-10-20 NOTE — TELEPHONE ENCOUNTER
PTS GRAND DAUGHTER IS WANTING THE RESULTS OF THE PTS URINE CULTURE AS WELL AS REFILLS ON THE MEDICATIONS THAT SHE WAS PRESCRIBED IN REHAB.

## 2020-10-21 NOTE — TELEPHONE ENCOUNTER
Patient requesting med refills that were prescribed by a different physician.    Last visit 10/12/20

## 2020-10-21 NOTE — TELEPHONE ENCOUNTER
PATIENT GRAND DAUGHTER CALLED AGAIN-ADVISED [facility]    PATIENT WAS PRESCRIBED THE FOLLOWING BY DIFF PROVIDER AND WOULD LIKE THEM PRESCRIBED BY DR LUIS    AMLODIPINE 5MG  CLOPIDOGREL  75MG  ATORVASTATIN  80MG    [address] [facility] AT [address] - [phone] [facility] [phone]       [phone]

## 2020-10-22 RX ORDER — ATORVASTATIN CALCIUM 80 MG/1
80 TABLET, FILM COATED ORAL NIGHTLY
Qty: 30 TABLET | Refills: 1
Start: 2020-10-22 | End: 2020-12-03

## 2020-10-22 RX ORDER — ATORVASTATIN CALCIUM 80 MG/1
80 TABLET, FILM COATED ORAL NIGHTLY
Qty: 30 TABLET | Refills: 1 | Status: CANCELLED | OUTPATIENT
Start: 2020-10-22

## 2020-10-22 RX ORDER — AMLODIPINE BESYLATE 5 MG/1
5 TABLET ORAL
Qty: 30 TABLET | Refills: 1
Start: 2020-10-22 | End: 2021-01-18

## 2020-10-22 RX ORDER — CLOPIDOGREL BISULFATE 75 MG/1
75 TABLET ORAL DAILY
Qty: 30 TABLET | Refills: 1 | Status: SHIPPED | OUTPATIENT
Start: 2020-10-22 | End: 2020-12-08

## 2020-10-22 RX ORDER — AMLODIPINE BESYLATE 5 MG/1
5 TABLET ORAL
Qty: 30 TABLET | Refills: 1 | Status: CANCELLED | OUTPATIENT
Start: 2020-10-22

## 2020-10-23 ENCOUNTER — TELEPHONE (OUTPATIENT)
Dept: NEUROLOGY | Facility: CLINIC | Age: 85
End: 2020-10-23

## 2020-10-23 NOTE — TELEPHONE ENCOUNTER
Attempted to reach patient to complete hospital follow up call.  Discharged from [facility] 9/25/20.  No answer.

## 2020-12-03 ENCOUNTER — OFFICE VISIT (OUTPATIENT)
Dept: NEUROLOGY | Facility: CLINIC | Age: 85
End: 2020-12-03

## 2020-12-03 VITALS
WEIGHT: 127 LBS | SYSTOLIC BLOOD PRESSURE: 108 MMHG | DIASTOLIC BLOOD PRESSURE: 70 MMHG | HEIGHT: 63 IN | OXYGEN SATURATION: 95 % | BODY MASS INDEX: 22.5 KG/M2 | HEART RATE: 82 BPM

## 2020-12-03 DIAGNOSIS — I67.9 CEREBROVASCULAR DISEASE: Primary | ICD-10-CM

## 2020-12-03 DIAGNOSIS — I10 ESSENTIAL HYPERTENSION: ICD-10-CM

## 2020-12-03 DIAGNOSIS — E78.5 HYPERLIPIDEMIA, UNSPECIFIED HYPERLIPIDEMIA TYPE: ICD-10-CM

## 2020-12-03 DIAGNOSIS — R26.81 GAIT INSTABILITY: ICD-10-CM

## 2020-12-03 PROCEDURE — 99215 OFFICE O/P EST HI 40 MIN: CPT | Performed by: NURSE PRACTITIONER

## 2020-12-03 RX ORDER — ATORVASTATIN CALCIUM 40 MG/1
40 TABLET, FILM COATED ORAL DAILY
Qty: 30 TABLET | Refills: 11 | Status: SHIPPED | OUTPATIENT
Start: 2020-12-03 | End: 2021-12-20

## 2020-12-03 NOTE — PROGRESS NOTES
"DOS: 2020  NAME: Allison Fitzpatrick   : 1934  PCP: Haleigh Velazquez MD    Chief Complaint   Patient presents with   • Stroke      SUBJECTIVE  Neurological Problem:  86 y.o. RHW female with stroke, HTN, HLD, macular degeneration (legally blind) and likely underlying baseline dementia who presents for f/u of stroke. Patient and problem are new to examiner. History is provided by patient and review of records that are summarized below.     Interval History:   Ms. Fitzpatrick presented to [facility] in 2020 with increasing frequency of falls at home (uses a walker at baseline), this was in the setting of treatment for a recent UTI.  Review of her imaging shows a CT head that was negative for acute findings, did show chronic small vessel disease.  CT of the C-spine showed degenerative changes. MRI brain showed scattered right basal ganglia infarcts.  Carotid ultrasound showed plaque by laterally but only mild stenosis of the right ICA, 1%-15% stenosis.  Echocardiogram showed an LVEF of 62%, mild to moderate LVH,  LA normal size and volume, saline test negative.  Her hospital visit was complicated by some sundowning, requiring Seroquel and restraints at one point.  She was discharged on DAPT x30 days then ASA alone along with statin.    She presents today and continues on ASA, and apparently is still taking Plavix as well, long with Lipitor 40 mg.  She has recovered well from her hospitalization and reports she is nearly at her baseline.  Her son lives with her and she is independent of all ADLs.  Daughter manages medications, fills pillbox. She has a   remote history of sleep apnea, was told she had a \"mild\" type, no follow-up.  She denies any falls.  No smoking.    Father  of heart attack, Brother  of heart attack, sister with some sort heart disease    Review of Systems:Review of Systems   Constitutional: Positive for fatigue. Negative for activity change and appetite change.   HENT: Negative for ear " pain, tinnitus and trouble swallowing.    Eyes: Negative for photophobia, pain and visual disturbance.   Respiratory: Negative for cough, chest tightness and shortness of breath.    Cardiovascular: Negative for chest pain, palpitations and leg swelling.   Gastrointestinal: Positive for abdominal pain. Negative for nausea and vomiting.   Endocrine: Negative for cold intolerance, polydipsia and polyphagia.   Musculoskeletal: Positive for back pain and gait problem. Negative for neck pain.   Skin: Negative for color change, pallor and rash.   Allergic/Immunologic: Negative for environmental allergies, food allergies and immunocompromised state.   Neurological: Positive for speech difficulty and numbness (Feet). Negative for dizziness, tremors, seizures, syncope, facial asymmetry, weakness, light-headedness and headaches.   Hematological: Negative for adenopathy. Does not bruise/bleed easily.   Psychiatric/Behavioral: Positive for sleep disturbance. Negative for agitation, behavioral problems, confusion, decreased concentration, dysphoric mood, hallucinations, self-injury and suicidal ideas. The patient is not nervous/anxious and is not hyperactive.     Above ROS reviewed    The following portions of the patient's history were reviewed and updated as appropriate: allergies, current medications, past family history, past medical history, past social history, past surgical history and problem list.    Current Medications:   Current Outpatient Medications:   •  acetaminophen (TYLENOL) 650 MG 8 hr tablet, Take 650 mg by mouth Every 8 (Eight) Hours As Needed for Mild Pain ., Disp: , Rfl:   •  amLODIPine (NORVASC) 5 MG tablet, Take 1 tablet by mouth Daily., Disp: 30 tablet, Rfl: 1  •  aspirin 81 MG chewable tablet, Chew 1 tablet Daily., Disp: , Rfl:   •  cholecalciferol (VITAMIN D3) 25 MCG (1000 UT) tablet, Take 1,000 Units by mouth Daily., Disp: , Rfl:   •  loperamide (IMODIUM) 2 MG capsule, Take 1 capsule by mouth 4 (Four)  Times a Day As Needed for Diarrhea., Disp: , Rfl:   •  atorvastatin (Lipitor) 40 MG tablet, Take 1 tablet by mouth Daily., Disp: 30 tablet, Rfl: 11  •  vitamin B-12 (CYANOCOBALAMIN) 1000 MCG tablet, Take 1,000 mcg by mouth Daily., Disp: , Rfl:   **I did not stop or change the above medications.  Patient's medication list was updated to reflect medications they have reported as currently taking, including medication changes made by other providers.    OBJECTIVE  Vitals:    12/03/20 1441   BP: 108/70   Pulse: 82   SpO2: 95%     Body mass index is 22.5 kg/m².    Diagnostics:  CT C-spine 8/22/2020: IMPRESSION:   1. Multilevel cervical degenerative disease as described.  2. No fractures are seen.  CT head 8/22/2020:IMPRESSION:  No acute process identified.  MRI brain 8/23/2020: HISTORY: Multiple recent falls. Weakness and confusion.  The MR scan was performed with sagittal and axial images without  contrast. There is moderate diffuse atrophy and chronic small vessel  ischemic change. There is no evidence of intracranial hemorrhage or mass  effect. The diffusion sequence demonstrates several very small infarcts  in the right basal ganglia predominantly in the region of the body of  the caudate nucleus and adjacent globus pallidus. These measure up to 11  mm in size. There is no evidence of associated hemorrhage or mass  effect.ONCLUSION: Acute infarcts scattered in the right basal ganglia  predominantly in the region of the body of the caudate nucleus and  adjacent globus pallidus.  Carotid ultrasound 8/24/20:nterpretation Summary  · Proximal right internal carotid artery mild stenosis.  · Proximal left internal carotid artery plaque without significant stenosis.   TTE 8/24/20: Interpretation Summary  · Calculated EF = 62%.  · Left ventricular systolic function is normal.  · Left ventricular wall thickness is consistent with mild-to-moderate concentric hypertrophy.  · Left ventricular diastolic dysfunction (grade I)  consistent with impaired relaxation.  · Mild mitral valve regurgitation is present  · Mild tricuspid valve regurgitation is present.  · Estimated right ventricular systolic pressure from tricuspid regurgitation is mildly elevated (35-45 mmHg).  · Saline test results are negative.            Laboratory Results:         Lab Results   Component Value Date    WBC 9.29 08/29/2020    HGB 12.7 08/29/2020    HCT 38.1 08/29/2020    MCV 88.8 08/29/2020     08/29/2020     Lab Results   Component Value Date    GLUCOSE 95 08/29/2020    BUN 17 08/29/2020    CREATININE 0.77 08/29/2020    EGFRIFNONA 71 08/29/2020    EGFRIFAFRI 58 (L) 10/02/2019    BCR 22.1 08/29/2020    K 4.2 08/29/2020    CO2 24.0 08/29/2020    CALCIUM 9.3 08/29/2020    PROTENTOTREF 7.1 10/02/2019    ALBUMIN 4.10 08/22/2020    LABIL2 1.5 10/02/2019    AST 23 08/22/2020    ALT 17 08/22/2020     Lab Results   Component Value Date    HGBA1C 6.10 (H) 08/24/2020     Lab Results   Component Value Date    CHOL 169 08/24/2020     Lab Results   Component Value Date    HDL 44 08/24/2020    HDL 42 02/12/2019    HDL 48 07/01/2017     Lab Results   Component Value Date     (H) 08/24/2020     (H) 02/12/2019     (H) 07/01/2017     Lab Results   Component Value Date    TRIG 101 08/24/2020    TRIG 235 (H) 02/12/2019    TRIG 182 (H) 07/01/2017     No results found for: RPR  Lab Results   Component Value Date    TSH 1.78 10/08/2018     Lab Results   Component Value Date    GOIFTCCS81 795 02/08/2018       Physical Exam:  GENERAL: NAD. elderly  HEENT: Normocephalic, khyphotic  COR: RRR  Resp: Even and unlabored  Extremities: Equal pulses, no signs of distal embolization.   Skin: No rashes, lesions or ulcers.  Psychiatric: Normal mood and affect.    Neurological:   MS: AO. Language normal. No neglect.  Mildly decreased attention and concentration.  Follows all commands.   CN: II-XII grossly normal except for decreased auditory acuity b/l  Motor: Normal  strength and tone throughout with subtle left pronator drift  Sensory: Intact to light touch in arms and legs  Station and Gait: Slow, cautious, unsteady gait, utilizing walker.  Coordination: Normal finger to nose bilaterally    Impression:  Ms. Fitzpatrick presents today for f/u of scattered right BG strokes she suffered in August 2020, she was treated with DAPT to be taken x 30 days, then ASA alone, apparently she is still on Plavix, have recommended finishing the few she has left and stopping. Continue Crestor.  We reviewed her vascular risk factors and the importance of RF control for stroke prevention including BP, cholesterol. She will f/u here as needed.     Plan:    Discontinue plavix, continue ASA, crestor  F/U with PCP for continued cholesterol surveillance  Monitor BP regularly  Keep well hydrated  Secondary stroke prevention: Ideal targets for stroke prevention would be Blood pressure < 130/80; B12 > 500 TSH in normal range and LDL < 70; HbA1c < 6.5 and smoking cessation if applicable.  Call 911 for stroke symptoms  Follow-up prn       Greater than 50% of this 50-minute visit was spent counseling patient regarding diagnosis, review of diagnostics, personal risk factors for stroke and importance of risk factor control for stroke prevention, including lifestyle modifications and preventative measures.   There are no diagnoses linked to this encounter.    Coding      Dictated using Dragon

## 2021-01-18 RX ORDER — AMLODIPINE BESYLATE 5 MG/1
TABLET ORAL
Qty: 90 TABLET | Refills: 1 | Status: SHIPPED | OUTPATIENT
Start: 2021-01-18 | End: 2021-07-26

## 2021-03-02 DIAGNOSIS — Z23 IMMUNIZATION DUE: ICD-10-CM

## 2021-07-26 RX ORDER — AMLODIPINE BESYLATE 5 MG/1
TABLET ORAL
Qty: 90 TABLET | Refills: 3 | Status: SHIPPED | OUTPATIENT
Start: 2021-07-26 | End: 2022-03-22 | Stop reason: SDUPTHER

## 2021-11-10 ENCOUNTER — TELEPHONE (OUTPATIENT)
Dept: FAMILY MEDICINE CLINIC | Facility: CLINIC | Age: 86
End: 2021-11-10

## 2021-11-10 NOTE — TELEPHONE ENCOUNTER
Caller: Tish Sánchez    Relationship to patient: Emergency Contact    Best call back number:[phone] (H)    Chief complaintYEARLY    Type of visit: SUB MEDICARE VISIT    Requested date: BEFORE MAY 2022    Additional notes:

## 2021-11-11 NOTE — TELEPHONE ENCOUNTER
I am glad to work her in before May, but if she would like to be seen this year an APRN would be great.

## 2021-11-11 NOTE — TELEPHONE ENCOUNTER
She has already been scheduled with an APRN. The daughter is not on the hippa form, nor do we have POA  Or health care paperwork informing that we can speak to her.

## 2021-12-14 ENCOUNTER — OFFICE VISIT (OUTPATIENT)
Dept: FAMILY MEDICINE CLINIC | Facility: CLINIC | Age: 86
End: 2021-12-14

## 2021-12-14 VITALS
BODY MASS INDEX: 22.33 KG/M2 | HEART RATE: 77 BPM | DIASTOLIC BLOOD PRESSURE: 76 MMHG | WEIGHT: 126 LBS | RESPIRATION RATE: 16 BRPM | TEMPERATURE: 97.7 F | SYSTOLIC BLOOD PRESSURE: 122 MMHG | OXYGEN SATURATION: 96 %

## 2021-12-14 DIAGNOSIS — I10 ESSENTIAL HYPERTENSION: ICD-10-CM

## 2021-12-14 DIAGNOSIS — E55.9 VITAMIN D DEFICIENCY: ICD-10-CM

## 2021-12-14 DIAGNOSIS — R07.89 CHEST WALL TENDERNESS: ICD-10-CM

## 2021-12-14 DIAGNOSIS — I67.9 CEREBROVASCULAR DISEASE: ICD-10-CM

## 2021-12-14 DIAGNOSIS — E78.5 HYPERLIPIDEMIA, UNSPECIFIED HYPERLIPIDEMIA TYPE: ICD-10-CM

## 2021-12-14 DIAGNOSIS — Z00.00 MEDICARE ANNUAL WELLNESS VISIT, SUBSEQUENT: Primary | ICD-10-CM

## 2021-12-14 DIAGNOSIS — R53.83 FATIGUE, UNSPECIFIED TYPE: ICD-10-CM

## 2021-12-14 PROCEDURE — 99214 OFFICE O/P EST MOD 30 MIN: CPT

## 2021-12-14 PROCEDURE — 1125F AMNT PAIN NOTED PAIN PRSNT: CPT

## 2021-12-14 PROCEDURE — G0439 PPPS, SUBSEQ VISIT: HCPCS

## 2021-12-14 PROCEDURE — 71046 X-RAY EXAM CHEST 2 VIEWS: CPT

## 2021-12-14 PROCEDURE — 1159F MED LIST DOCD IN RCRD: CPT

## 2021-12-14 PROCEDURE — 1170F FXNL STATUS ASSESSED: CPT

## 2021-12-14 RX ORDER — ATORVASTATIN CALCIUM 80 MG/1
80 TABLET, FILM COATED ORAL DAILY
COMMUNITY
End: 2021-12-20 | Stop reason: ALTCHOICE

## 2021-12-14 NOTE — ASSESSMENT & PLAN NOTE
This has been a chronic problem said for several years.  Chest x-ray obtained in the office and did not see any acute findings.   similar to last chest x-ray in 2020.  Awaiting a full radiology report and will follow up with patient when it has resulted.

## 2021-12-14 NOTE — ASSESSMENT & PLAN NOTE
No more signs and symptoms of a stroke over the past year since she was hospitalized.  No falls, weakness, facial drooping, or increased confusion over the past year.  Patient to continue on aspirin 81 mg and cholesterol medication.

## 2021-12-14 NOTE — ASSESSMENT & PLAN NOTE
Patient immunizations are up-to-date.  Patient's daughter said she believes she has had shingles vaccine but does not have records at this time.  Patient refusing DEXA scan today and has aged out of mammograms and Pap smear.  Colonoscopy was complete in 2016 although I cannot see records of when they want her to have a repeat one.  Counseled patient with fall precautions and to ambulate as much as possible to help with decrease in bone loss.  Also to maintain a healthy diet.  Patient's weight has remained stable over the past year.  Patient is mostly independent at home although she does have macular degeneration and hearing impairment.  With the help of her son patient is still able to cook and clean in her house right now.

## 2021-12-14 NOTE — PROGRESS NOTES
The ABCs of the Annual Wellness Visit  Subsequent Medicare Wellness Visit    Chief Complaint   Patient presents with   • Medicare Wellness-subsequent     No complaints      Subjective    History of Present Illness:  Allison Fitzpatrick is a 87 y.o. female who presents for a Subsequent Medicare Wellness Visit.    Patient does have a history of hypertension is on amlodipine 5 mg controlled well.  No headache, dizziness, shortness of breath or lower extremity edema.    Patient also has high cholesterol and is currently on atorvastatin 80 mg.  Patient tolerates this well with no side effects.    Patient also has a history of a stroke which was diagnosed with in 2020.  Patient had a follow-up with neurology at the end of 2020 where they stopped her Plavix and said to continue with aspirin 81 mg and tight cholesterol control.  At this time patient does not have any residual effects from the stroke.    Only other complaints today are of some chest wall tenderness with pressing against the sternal wall.  Patient's daughter said this has been an issue for several years and she had told her previous PCP about this.  The tenderness does not seem to be any worse over the past few years and is not overly bothersome.  No chest pain with resting, only with palpation.    Says she also just feels generalized fatigue and tired all the time.  Patient has not had any lab work checked in the past year.    The following portions of the patient's history were reviewed and   updated as appropriate: allergies, current medications, past family history, past medical history, past social history, past surgical history and problem list.    Compared to one year ago, the patient feels her physical   health is better.    Compared to one year ago, the patient feels her mental   health is the same.    Recent Hospitalizations:  She was not admitted to the hospital during the last year.       Current Medical Providers:  Patient Care Team:  Haleigh Velazquez  MD JANICE as PCP - General (Family Medicine)  Angelia Cormier MD as Consulting Physician (Otolaryngology)  Sharita Garrett MD as Consulting Physician (Cardiology)    Outpatient Medications Prior to Visit   Medication Sig Dispense Refill   • acetaminophen (TYLENOL) 650 MG 8 hr tablet Take 650 mg by mouth Every 8 (Eight) Hours As Needed for Mild Pain .     • amLODIPine (NORVASC) 5 MG tablet TAKE ONE TABLET BY MOUTH DAILY 90 tablet 3   • aspirin 81 MG chewable tablet Chew 1 tablet Daily.     • atorvastatin (LIPITOR) 80 MG tablet Take 80 mg by mouth Daily.     • cholecalciferol (VITAMIN D3) 25 MCG (1000 UT) tablet Take 1,000 Units by mouth Daily.     • loperamide (IMODIUM) 2 MG capsule Take 1 capsule by mouth 4 (Four) Times a Day As Needed for Diarrhea.       No facility-administered medications prior to visit.       No opioid medication identified on active medication list. I have reviewed chart for other potential  high risk medication/s and harmful drug interactions in the elderly.          Aspirin is on active medication list. Aspirin use is indicated based on review of current medical condition/s. Pros and cons of this therapy have been discussed today. Benefits of this medication outweigh potential harm.  Patient has been encouraged to continue taking this medication.  .      Patient Active Problem List   Diagnosis   • Diverticulitis of large intestine   • Near syncope   • Essential hypertension   • Bilateral hearing loss   • Gait instability   • Cholesteatoma of attic of left ear   • HLD (hyperlipidemia)   • Chest pain   • Community acquired pneumonia of left lung   • HARRY (acute kidney injury) (HCC)   • Acute ischemic stroke (HCC)   • Cerebrovascular disease   • Medicare annual wellness visit, subsequent   • Fatigue   • Chest wall tenderness     Advance Care Planning  Advance Directive is on file.  ACP discussion was held with the patient during this visit. Patient has an advance directive in EMR which is  still valid.     Review of Systems   Constitutional: Positive for fatigue.   HENT: Negative.    Eyes: Negative.    Respiratory: Negative.    Cardiovascular: Negative.    Gastrointestinal: Negative.    Endocrine: Negative.    Genitourinary: Negative.    Musculoskeletal:        Chest wall tenderness.   Skin: Negative.    Allergic/Immunologic: Negative.    Neurological: Negative.    Hematological: Negative.    Psychiatric/Behavioral: Negative.         Objective    Vitals:    12/14/21 1353   BP: 122/76   Pulse: 77   Resp: 16   Temp: 97.7 °F (36.5 °C)   SpO2: 96%   Weight: 57.2 kg (126 lb)   PainSc:   4     BMI Readings from Last 1 Encounters:   12/14/21 22.33 kg/m²   BMI is within normal parameters. No follow-up required.  Body mass index is 22.33 kg/m².  BMI has not been calculated during today's encounter.     Does the patient have evidence of cognitive impairment? No    Physical Exam  Vitals reviewed.   Constitutional:       General: She is not in acute distress.  HENT:      Head: Normocephalic.      Right Ear: Decreased hearing noted.      Left Ear: Decreased hearing noted.   Cardiovascular:      Rate and Rhythm: Normal rate.      Pulses: Normal pulses.      Heart sounds: Normal heart sounds. No murmur heard.      Pulmonary:      Effort: Pulmonary effort is normal.      Breath sounds: Normal breath sounds.   Chest:      Chest wall: Tenderness present.   Musculoskeletal:      Comments: Use a walker for ambulation   Skin:     General: Skin is warm and dry.   Neurological:      General: No focal deficit present.      Mental Status: She is alert and oriented to person, place, and time.   Psychiatric:         Mood and Affect: Mood normal.                 HEALTH RISK ASSESSMENT    Smoking Status:  Social History     Tobacco Use   Smoking Status Never Smoker   Smokeless Tobacco Never Used     Alcohol Consumption:  Social History     Substance and Sexual Activity   Alcohol Use No     Fall Risk Screen:    STEADI Fall Risk  Assessment was completed, and patient is at MODERATE risk for falls. Assessment completed on:12/14/2021    Depression Screening:  PHQ-2/PHQ-9 Depression Screening 12/14/2021   Little interest or pleasure in doing things 0   Feeling down, depressed, or hopeless 0   Total Score 0       Health Habits and Functional and Cognitive Screening:  Functional & Cognitive Status 12/14/2021   Do you have difficulty preparing food and eating? No   Do you have difficulty bathing yourself, getting dressed or grooming yourself? Yes   Do you have difficulty using the toilet? No   Do you have difficulty moving around from place to place? Yes   Do you have trouble with steps or getting out of a bed or a chair? Yes   Current Diet Other   Dental Exam Up to date   Eye Exam Not up to date   Exercise (times per week) 0 times per week   Current Exercises Include No Regular Exercise   Current Exercise Activities Include -   Do you need help using the phone?  No   Are you deaf or do you have serious difficulty hearing?  Yes   Do you need help with transportation? Yes   Do you need help shopping? Yes   Do you need help preparing meals?  No   Do you need help with housework?  No   Do you need help with laundry? No   Do you need help taking your medications? No   Do you need help managing money? Yes   Do you ever drive or ride in a car without wearing a seat belt? No   Have you felt unusual stress, anger or loneliness in the last month? No   Who do you live with? Other   If you need help, do you have trouble finding someone available to you? No   Have you been bothered in the last four weeks by sexual problems? No   Do you have difficulty concentrating, remembering or making decisions? Yes       Age-appropriate Screening Schedule:  Refer to the list below for future screening recommendations based on patient's age, sex and/or medical conditions. Orders for these recommended tests are listed in the plan section. The patient has been provided with  a written plan.    Health Maintenance   Topic Date Due   • ZOSTER VACCINE (1 of 2) Never done   • LIPID PANEL  08/24/2021   • DXA SCAN  12/14/2021 (Originally 1934)   • TDAP/TD VACCINES (2 - Tdap) 02/08/2028   • INFLUENZA VACCINE  Completed   • MAMMOGRAM  Discontinued              Assessment/Plan   CMS Preventative Services Quick Reference  Risk Factors Identified During Encounter  Cardiovascular Disease  Dementia/Memory   Fall Risk-High or Moderate  Hearing Problem  Immunizations Discussed/Encouraged (specific Immunizations; Shingrix  The above risks/problems have been discussed with the patient.  Follow up actions/plans if indicated are seen below in the Assessment/Plan Section.  Pertinent information has been shared with the patient in the After Visit Summary.    Diagnoses and all orders for this visit:    1. Medicare annual wellness visit, subsequent (Primary)  Assessment & Plan:  Patient immunizations are up-to-date.  Patient's daughter said she believes she has had shingles vaccine but does not have records at this time.  Patient refusing DEXA scan today and has aged out of mammograms and Pap smear.  Colonoscopy was complete in 2016 although I cannot see records of when they want her to have a repeat one.  Counseled patient with fall precautions and to ambulate as much as possible to help with decrease in bone loss.  Also to maintain a healthy diet.  Patient's weight has remained stable over the past year.  Patient is mostly independent at home although she does have macular degeneration and hearing impairment.  With the help of her son patient is still able to cook and clean in her house right now.      2. Cerebrovascular disease  Assessment & Plan:  No more signs and symptoms of a stroke over the past year since she was hospitalized.  No falls, weakness, facial drooping, or increased confusion over the past year.  Patient to continue on aspirin 81 mg and cholesterol medication.      3. Essential  hypertension  Assessment & Plan:  Stable.  Continue current regimen.    Orders:  -     Comprehensive Metabolic Panel; Future    4. Hyperlipidemia, unspecified hyperlipidemia type  Assessment & Plan:  Patient to return for lab only appointment so we can recheck cholesterol.    Orders:  -     Lipid Panel; Future    5. Fatigue, unspecified type  Assessment & Plan:  Checking labs for thyroid and any signs of anemia when patient returns for lab only appointment.    Orders:  -     CBC & Differential; Future  -     TSH; Future    6. Chest wall tenderness  Assessment & Plan:  This has been a chronic problem said for several years.  Chest x-ray obtained in the office and did not see any acute findings.   similar to last chest x-ray in 2020.  Awaiting a full radiology report and will follow up with patient when it has resulted.    Orders:  -     XR Chest PA & Lateral (In Office)    7. Vitamin D deficiency  -     Vitamin D 25 Hydroxy; Future      Follow Up:   Return in about 3 months (around 3/14/2022) for Recheck chest soreness.     An After Visit Summary and PPPS were made available to the patient.               Medical assistant and I wore mask and eyewear protection during entire encounter.  Patient wore mask.      Electronically signed by ASHLY Forrester, 12/14/21, 4:32 PM EST.

## 2021-12-20 RX ORDER — ATORVASTATIN CALCIUM 40 MG/1
TABLET, FILM COATED ORAL
Qty: 30 TABLET | Refills: 0 | Status: SHIPPED | OUTPATIENT
Start: 2021-12-20 | End: 2022-01-24

## 2021-12-20 NOTE — TELEPHONE ENCOUNTER
Pt last seen 12/3/2020. No follow up scheduled.      Quantity 30 for 30 days.      Please review and approve or advise.     Thank you.

## 2022-01-24 RX ORDER — ATORVASTATIN CALCIUM 40 MG/1
TABLET, FILM COATED ORAL
Qty: 30 TABLET | Refills: 0 | Status: SHIPPED | OUTPATIENT
Start: 2022-01-24 | End: 2022-02-22 | Stop reason: SDUPTHER

## 2022-02-22 RX ORDER — ATORVASTATIN CALCIUM 40 MG/1
40 TABLET, FILM COATED ORAL DAILY
Qty: 30 TABLET | Refills: 0 | Status: SHIPPED | OUTPATIENT
Start: 2022-02-22 | End: 2022-03-22 | Stop reason: SDUPTHER

## 2022-02-22 NOTE — TELEPHONE ENCOUNTER
Caller: Tish Sánchez    Relationship: Emergency Contact    Best call back number: [phone]    Requested Prescriptions:   Requested Prescriptions     Pending Prescriptions Disp Refills   • atorvastatin (LIPITOR) 40 MG tablet 30 tablet 0     Sig: Take 1 tablet by mouth Daily.        Pharmacy where request should be sent: [facility] [place] [facility] [address] [facility] AT [address] [phone] [facility] [phone]      Additional details provided by patient: PATIENT WAS PRESCRIBED ATORVASTATIN BY DR. HARRELL BUT HASN'T BEEN SEEN BY HER IN 2 YEARS, SO THEY WILL NOT REFILL IT UNTIL SHE IS SEEN. THEY SAID SINCE SHE HAS BEEN SEEN BY DR. LUIS MORE RECENTLY TO ASK IF SHE'S ABLE TO REFILL THIS FROM NOW ON. PATIENT'S DAUGHTER CALLED TO SEE IF DR. LUIS IS WILLING TO DO THAT.   PATIENT HAS 3 PILLS LEFT AND WILL BE OUT OF REFILLS.  PLEASE CALL DAUGHTER IF THIS IS POSSIBLE.     Does the patient have less than a 3 day supply:  [x] Yes  [] No    Shila NUÑZE Rep   02/22/22 08:40 EST

## 2022-03-22 ENCOUNTER — OFFICE VISIT (OUTPATIENT)
Dept: FAMILY MEDICINE CLINIC | Facility: CLINIC | Age: 87
End: 2022-03-22

## 2022-03-22 VITALS
OXYGEN SATURATION: 96 % | DIASTOLIC BLOOD PRESSURE: 78 MMHG | TEMPERATURE: 97.9 F | HEIGHT: 64 IN | BODY MASS INDEX: 23.05 KG/M2 | WEIGHT: 135 LBS | HEART RATE: 79 BPM | SYSTOLIC BLOOD PRESSURE: 136 MMHG

## 2022-03-22 DIAGNOSIS — I67.9 CEREBROVASCULAR DISEASE: ICD-10-CM

## 2022-03-22 DIAGNOSIS — I10 ESSENTIAL HYPERTENSION: Primary | ICD-10-CM

## 2022-03-22 PROCEDURE — 99214 OFFICE O/P EST MOD 30 MIN: CPT | Performed by: FAMILY MEDICINE

## 2022-03-22 RX ORDER — ATORVASTATIN CALCIUM 40 MG/1
40 TABLET, FILM COATED ORAL DAILY
Qty: 90 TABLET | Refills: 3 | Status: SHIPPED | OUTPATIENT
Start: 2022-03-22 | End: 2023-01-18 | Stop reason: SDUPTHER

## 2022-03-22 RX ORDER — AMLODIPINE BESYLATE 5 MG/1
5 TABLET ORAL DAILY
Qty: 90 TABLET | Refills: 3 | Status: SHIPPED | OUTPATIENT
Start: 2022-03-22 | End: 2023-01-18 | Stop reason: SDUPTHER

## 2022-03-22 NOTE — PROGRESS NOTES
"Chief Complaint  Hypertension (3 month follow up no copmplains will need refill ,no swelling or soa ) and Hyperlipidemia (Doing well no complain will need refills today )    Subjective          Allison Fitzpatrick presents to [facility]  History of Present Illness  Pleasant 87-year-old female here to follow-up for hypertension and hyperlipidemia.  She is accompanied by her daughter Masha - she was seen in December for Medicare wellness visit and experienced significant chest tenderness at that time her chest x-ray was stable from 2020.  Some chronic changes but nothing acute.  Her daughter reports that this has been a chronic problem without changes..      Objective   Vital Signs:   /78   Pulse 79   Temp 97.9 °F (36.6 °C)   Ht 162.6 cm (64\")   Wt 61.2 kg (135 lb)   SpO2 96%   BMI 23.17 kg/m²     BMI is within normal parameters. No follow-up required.      Physical Exam  Vitals and nursing note reviewed.   Constitutional:       General: She is not in acute distress.     Appearance: She is well-developed.   HENT:      Head: Normocephalic.      Nose: Nose normal.   Cardiovascular:      Rate and Rhythm: Normal rate and regular rhythm.      Heart sounds: Normal heart sounds. No murmur heard.  Pulmonary:      Effort: Pulmonary effort is normal. No respiratory distress.      Breath sounds: Normal breath sounds.   Musculoskeletal:         General: Normal range of motion.   Skin:     General: Skin is warm and dry.      Findings: No rash.   Neurological:      Mental Status: She is alert and oriented to person, place, and time.   Psychiatric:         Behavior: Behavior normal.         Thought Content: Thought content normal.         Judgment: Judgment normal.        Result Review :   The following data was reviewed by: Haleigh Velazquez MD on 03/22/2022:  Common labs    Common Labsle 12/17/21 12/17/21 12/17/21    0943 0943 0943   Glucose  99    BUN  23    Creatinine  0.96    eGFR Non "  Am  53 (A)    eGFR African Am  61    Sodium  143    Potassium  4.4    Chloride  104    Calcium  9.9    Total Protein  7.0    Albumin  4.6    Total Bilirubin  0.6    Alkaline Phosphatase  73    AST (SGOT)  18    ALT (SGPT)  15    WBC 6.9     Hemoglobin 12.7     Hematocrit 38.1     Platelets 204     Total Cholesterol   115   Triglycerides   94   HDL Cholesterol   48   LDL Cholesterol    49   (A) Abnormal value       Comments are available for some flowsheets but are not being displayed.                     Assessment and Plan    Diagnoses and all orders for this visit:    1. Essential hypertension (Primary)  -     amLODIPine (NORVASC) 5 MG tablet; Take 1 tablet by mouth Daily.  Dispense: 90 tablet; Refill: 3    2. Cerebrovascular disease  -     atorvastatin (LIPITOR) 40 MG tablet; Take 1 tablet by mouth Daily.  Dispense: 90 tablet; Refill: 3    Pleasant 87-year-old female here for hypertension hyperlipidemia that are well controlled, she has had a CVA, she is on aspirin no adverse effects no symptoms that have been residual since then.  She is doing well.  Her greatest concern is hearing loss.  She can get hearing testing/hearing aids if needed.  We discussed maintaining good nutrition, thankfully she has gained 10 pounds since her last appointment.  Continue with being as active as possible, discussed different nonpharmacologic aids (opening windows, clocks around the house, having it dark and cool at night ) to help keep her night and day cycle and line if she does tend to stay awake more at night now.      Follow Up   Return in about 6 months (around 9/22/2022), or if symptoms worsen or fail to improve, for Recheck HTN .  Patient was given instructions and counseling regarding her condition or for health maintenance advice. Please see specific information pulled into the AVS if appropriate. Medical assistant and I wore mask and eyewear protection during entire encounter.  Patient wore mask.    Haleigh Velazquez,  MD

## 2022-09-22 ENCOUNTER — OFFICE VISIT (OUTPATIENT)
Dept: FAMILY MEDICINE CLINIC | Facility: CLINIC | Age: 87
End: 2022-09-22

## 2022-09-22 VITALS
WEIGHT: 128 LBS | DIASTOLIC BLOOD PRESSURE: 72 MMHG | OXYGEN SATURATION: 96 % | SYSTOLIC BLOOD PRESSURE: 140 MMHG | HEART RATE: 72 BPM | BODY MASS INDEX: 21.85 KG/M2 | TEMPERATURE: 97.8 F | HEIGHT: 64 IN

## 2022-09-22 DIAGNOSIS — E55.9 VITAMIN D DEFICIENCY: ICD-10-CM

## 2022-09-22 DIAGNOSIS — I10 ESSENTIAL HYPERTENSION: Primary | ICD-10-CM

## 2022-09-22 DIAGNOSIS — K21.9 GASTROESOPHAGEAL REFLUX DISEASE WITHOUT ESOPHAGITIS: ICD-10-CM

## 2022-09-22 DIAGNOSIS — E78.5 HYPERLIPIDEMIA, UNSPECIFIED HYPERLIPIDEMIA TYPE: ICD-10-CM

## 2022-09-22 LAB
25(OH)D3+25(OH)D2 SERPL-MCNC: 60.1 NG/ML (ref 30–100)
ALBUMIN SERPL-MCNC: 4.6 G/DL (ref 3.5–5.2)
ALBUMIN/GLOB SERPL: 2.2 G/DL
ALP SERPL-CCNC: 58 U/L (ref 39–117)
ALT SERPL-CCNC: 12 U/L (ref 1–33)
AST SERPL-CCNC: 20 U/L (ref 1–32)
BASOPHILS # BLD AUTO: 0.04 10*3/MM3 (ref 0–0.2)
BASOPHILS NFR BLD AUTO: 0.6 % (ref 0–1.5)
BILIRUB SERPL-MCNC: 0.5 MG/DL (ref 0–1.2)
BUN SERPL-MCNC: 27 MG/DL (ref 8–23)
BUN/CREAT SERPL: 27.8 (ref 7–25)
CALCIUM SERPL-MCNC: 9.5 MG/DL (ref 8.6–10.5)
CHLORIDE SERPL-SCNC: 109 MMOL/L (ref 98–107)
CHOLEST SERPL-MCNC: 113 MG/DL (ref 0–200)
CO2 SERPL-SCNC: 25 MMOL/L (ref 22–29)
CREAT SERPL-MCNC: 0.97 MG/DL (ref 0.57–1)
EGFRCR SERPLBLD CKD-EPI 2021: 56.7 ML/MIN/1.73
EOSINOPHIL # BLD AUTO: 0.22 10*3/MM3 (ref 0–0.4)
EOSINOPHIL NFR BLD AUTO: 3.5 % (ref 0.3–6.2)
ERYTHROCYTE [DISTWIDTH] IN BLOOD BY AUTOMATED COUNT: 13.1 % (ref 12.3–15.4)
GLOBULIN SER CALC-MCNC: 2.1 GM/DL
GLUCOSE SERPL-MCNC: 96 MG/DL (ref 65–99)
HCT VFR BLD AUTO: 35.7 % (ref 34–46.6)
HDLC SERPL-MCNC: 51 MG/DL (ref 40–60)
HGB BLD-MCNC: 11.7 G/DL (ref 12–15.9)
IMM GRANULOCYTES # BLD AUTO: 0.01 10*3/MM3 (ref 0–0.05)
IMM GRANULOCYTES NFR BLD AUTO: 0.2 % (ref 0–0.5)
LDLC SERPL CALC-MCNC: 45 MG/DL (ref 0–100)
LYMPHOCYTES # BLD AUTO: 1.74 10*3/MM3 (ref 0.7–3.1)
LYMPHOCYTES NFR BLD AUTO: 27.5 % (ref 19.6–45.3)
MCH RBC QN AUTO: 30.2 PG (ref 26.6–33)
MCHC RBC AUTO-ENTMCNC: 32.8 G/DL (ref 31.5–35.7)
MCV RBC AUTO: 92.2 FL (ref 79–97)
MONOCYTES # BLD AUTO: 0.62 10*3/MM3 (ref 0.1–0.9)
MONOCYTES NFR BLD AUTO: 9.8 % (ref 5–12)
NEUTROPHILS # BLD AUTO: 3.69 10*3/MM3 (ref 1.7–7)
NEUTROPHILS NFR BLD AUTO: 58.4 % (ref 42.7–76)
NRBC BLD AUTO-RTO: 0 /100 WBC (ref 0–0.2)
PLATELET # BLD AUTO: 172 10*3/MM3 (ref 140–450)
POTASSIUM SERPL-SCNC: 4.4 MMOL/L (ref 3.5–5.2)
PROT SERPL-MCNC: 6.7 G/DL (ref 6–8.5)
RBC # BLD AUTO: 3.87 10*6/MM3 (ref 3.77–5.28)
SODIUM SERPL-SCNC: 144 MMOL/L (ref 136–145)
TRIGL SERPL-MCNC: 87 MG/DL (ref 0–150)
TSH SERPL DL<=0.005 MIU/L-ACNC: 1.54 UIU/ML (ref 0.27–4.2)
VLDLC SERPL CALC-MCNC: 17 MG/DL (ref 5–40)
WBC # BLD AUTO: 6.32 10*3/MM3 (ref 3.4–10.8)

## 2022-09-22 PROCEDURE — 99214 OFFICE O/P EST MOD 30 MIN: CPT | Performed by: FAMILY MEDICINE

## 2022-09-22 RX ORDER — FAMOTIDINE 40 MG/1
40 TABLET, FILM COATED ORAL NIGHTLY PRN
Qty: 30 TABLET | Refills: 2 | Status: SHIPPED | OUTPATIENT
Start: 2022-09-22 | End: 2022-11-30

## 2022-09-22 NOTE — ASSESSMENT & PLAN NOTE
Hypertension well controlled, goal blood pressure less than 150/90.  There is been a slight upward trend over the last year but still at goal.  Plan to continue with amlodipine 5 mg daily, limiting salt and rechecking labs today.  Advised patient to check blood pressure at home and follow-up sooner if blood pressure goes above goal otherwise in 6 months.

## 2022-09-22 NOTE — PROGRESS NOTES
"Chief Complaint  Hypertension (6 month follow up no complains ) and Heartburn ( Pt has  sometimes acid reflux at night little cough when lay down and when eat sometimes get a little trouble swallowing )    Subjective        Allison Fitzpatrick presents to [facility]  History of Present Illness  Pleasant 87-year-old female here for follow-up hypertension, well controlled with goal less than 150/90 due to age with geriatric criteria.  Also hyperlipidemia that is well controlled on atorvastatin, we have discussed discontinuing this medication.  However with her history of stroke she would like to continue for now.    She also is having symptoms of GERD, she had a chest spasm with tightness when swallowing and has some times she has more congestion and coughing up some phlegm.  She things she was eating some roast beef.     Objective   Vital Signs:  /72   Pulse 72   Temp 97.8 °F (36.6 °C)   Ht 162.6 cm (64\")   Wt 58.1 kg (128 lb)   SpO2 96%   BMI 21.97 kg/m²   Estimated body mass index is 21.97 kg/m² as calculated from the following:    Height as of this encounter: 162.6 cm (64\").    Weight as of this encounter: 58.1 kg (128 lb).    BMI is within normal parameters. No other follow-up for BMI required.      Physical Exam  Vitals and nursing note reviewed.   Constitutional:       General: She is not in acute distress.     Appearance: She is well-developed.   HENT:      Head: Normocephalic.      Nose: Nose normal.   Cardiovascular:      Rate and Rhythm: Normal rate and regular rhythm.      Heart sounds: Normal heart sounds. No murmur heard.  Pulmonary:      Effort: Pulmonary effort is normal. No respiratory distress.      Breath sounds: Normal breath sounds.   Musculoskeletal:         General: Normal range of motion.   Skin:     General: Skin is warm and dry.      Findings: No rash.   Neurological:      Mental Status: She is alert and oriented to person, place, and time. "   Psychiatric:         Behavior: Behavior normal.         Thought Content: Thought content normal.         Judgment: Judgment normal.        Result Review :                Assessment and Plan   Diagnoses and all orders for this visit:    1. Essential hypertension (Primary)  Assessment & Plan:  Hypertension well controlled, goal blood pressure less than 150/90.  There is been a slight upward trend over the last year but still at goal.  Plan to continue with amlodipine 5 mg daily, limiting salt and rechecking labs today.  Advised patient to check blood pressure at home and follow-up sooner if blood pressure goes above goal otherwise in 6 months.    Orders:  -     Comprehensive Metabolic Panel  -     CBC & Differential  -     TSH Rfx On Abnormal To Free T4    2. Hyperlipidemia, unspecified hyperlipidemia type  -     Lipid Panel    3. Vitamin D deficiency  -     Vitamin D 25 Hydroxy    4. Gastroesophageal reflux disease without esophagitis  -     famotidine (Pepcid) 40 MG tablet; Take 1 tablet by mouth At Night As Needed for Heartburn.  Dispense: 30 tablet; Refill: 2  Hyperlipidemia well-controlled on atorvastatin, no recurrent stroke symptoms, GERD symptoms with some dysphagia, okay to start antiacid.  I encouraged her to take the Pepcid nightly.  Avoid foods that trigger symptoms especially roast beef.  She is here with her daughter Tish, discussed medical problems with her daughter especially as the patient is hard of hearing.  They are both in agreement.  She would like to do the least invasive treatment options possible.         Follow Up   Return in about 6 months (around 3/22/2023), or if symptoms worsen or fail to improve, for Annual Exam with fasting labs prior.  Patient was given instructions and counseling regarding her condition or for health maintenance advice. Please see specific information pulled into the AVS if appropriate. Medical assistant and I wore mask and eyewear protection during entire  encounter.  Patient wore mask.    Haleigh Velazquez MD

## 2022-09-30 NOTE — PROGRESS NOTES
Please call patient back with results.  The labs has resulted as stable kidney function, normal liver function, slight anemia from before, would recommend increasing iron rich foods.  Cholesterol is normal, vitamin D is normal, thyroid function is normal.  Please let us know if you have further questions.     Thank you

## 2022-11-30 DIAGNOSIS — K21.9 GASTROESOPHAGEAL REFLUX DISEASE WITHOUT ESOPHAGITIS: ICD-10-CM

## 2022-11-30 RX ORDER — FAMOTIDINE 40 MG/1
TABLET, FILM COATED ORAL
Qty: 30 TABLET | Refills: 11 | Status: SHIPPED | OUTPATIENT
Start: 2022-11-30

## 2023-01-18 DIAGNOSIS — I10 ESSENTIAL HYPERTENSION: ICD-10-CM

## 2023-01-18 DIAGNOSIS — I67.9 CEREBROVASCULAR DISEASE: ICD-10-CM

## 2023-01-18 RX ORDER — AMLODIPINE BESYLATE 5 MG/1
5 TABLET ORAL DAILY
Qty: 90 TABLET | Refills: 3 | Status: SHIPPED | OUTPATIENT
Start: 2023-01-18

## 2023-01-18 RX ORDER — ATORVASTATIN CALCIUM 40 MG/1
40 TABLET, FILM COATED ORAL DAILY
Qty: 90 TABLET | Refills: 3 | Status: SHIPPED | OUTPATIENT
Start: 2023-01-18

## 2023-04-11 ENCOUNTER — OFFICE VISIT (OUTPATIENT)
Dept: FAMILY MEDICINE CLINIC | Facility: CLINIC | Age: 88
End: 2023-04-11
Payer: MEDICARE

## 2023-04-11 VITALS
BODY MASS INDEX: 20.49 KG/M2 | HEIGHT: 64 IN | WEIGHT: 120 LBS | TEMPERATURE: 96.8 F | OXYGEN SATURATION: 98 % | DIASTOLIC BLOOD PRESSURE: 66 MMHG | SYSTOLIC BLOOD PRESSURE: 130 MMHG | HEART RATE: 75 BPM

## 2023-04-11 DIAGNOSIS — I67.9 CEREBROVASCULAR DISEASE: ICD-10-CM

## 2023-04-11 DIAGNOSIS — E78.5 HYPERLIPIDEMIA, UNSPECIFIED HYPERLIPIDEMIA TYPE: ICD-10-CM

## 2023-04-11 DIAGNOSIS — Z00.00 MEDICARE ANNUAL WELLNESS VISIT, SUBSEQUENT: Primary | ICD-10-CM

## 2023-04-11 DIAGNOSIS — E55.9 VITAMIN D DEFICIENCY: ICD-10-CM

## 2023-04-11 DIAGNOSIS — L03.311 CELLULITIS OF ABDOMINAL WALL: ICD-10-CM

## 2023-04-11 NOTE — PROGRESS NOTES
The ABCs of the Annual Wellness Visit  Subsequent Medicare Wellness Visit    Subjective      Allison Fitzpatrick is a 88 y.o. female who presents for a Subsequent Medicare Wellness Visit.    The following portions of the patient's history were reviewed and   updated as appropriate: allergies, current medications, past family history, past medical history, past social history, past surgical history and problem list.    Compared to one year ago, the patient feels her physical   health is the same.    Compared to one year ago, the patient feels her mental   health is the same.    Recent Hospitalizations:  She was not admitted to the hospital during the last year.       Current Medical Providers:  Patient Care Team:  Haleigh Nicolas MD as PCP - General (Family Medicine)  Angelia Cormier MD as Consulting Physician (Otolaryngology)  Sharita Garrett MD as Consulting Physician (Cardiology)    Outpatient Medications Prior to Visit   Medication Sig Dispense Refill   • acetaminophen (TYLENOL) 650 MG 8 hr tablet Take 1 tablet by mouth Every 8 (Eight) Hours As Needed for Mild Pain.     • amLODIPine (NORVASC) 5 MG tablet Take 1 tablet by mouth Daily. 90 tablet 3   • aspirin 81 MG chewable tablet Chew 1 tablet Daily.     • atorvastatin (LIPITOR) 40 MG tablet Take 1 tablet by mouth Daily. 90 tablet 3   • famotidine (PEPCID) 40 MG tablet TAKE 1 TABLET AT NIGHT AS NEEDED FOR HEARTBURN 30 tablet 11   • loperamide (IMODIUM) 2 MG capsule Take 1 capsule by mouth 4 (Four) Times a Day As Needed for Diarrhea.     • cholecalciferol (VITAMIN D3) 25 MCG (1000 UT) tablet Take 1,000 Units by mouth Daily. (Patient not taking: Reported on 4/11/2023)       No facility-administered medications prior to visit.       No opioid medication identified on active medication list. I have reviewed chart for other potential  high risk medication/s and harmful drug interactions in the elderly.          Aspirin is on active medication list. Aspirin use is  "indicated based on review of current medical condition/s. Pros and cons of this therapy have been discussed today. Benefits of this medication outweigh potential harm.  Patient has been encouraged to continue taking this medication.  .      Patient Active Problem List   Diagnosis   • Diverticulitis of large intestine   • Near syncope   • Essential hypertension   • Bilateral hearing loss   • Gait instability   • Cholesteatoma of attic of left ear   • HLD (hyperlipidemia)   • Chest pain   • Community acquired pneumonia of left lung   • HARRY (acute kidney injury)   • Acute ischemic stroke   • Cerebrovascular disease   • Medicare annual wellness visit, subsequent   • Fatigue   • Chest wall tenderness     Advance Care Planning   Advance Care Planning     Advance Directive is on file.  ACP discussion was held with the patient during this visit. Patient has an advance directive in EMR which is still valid.      Objective    Vitals:    04/11/23 0915   BP: 130/66   Pulse: 75   Temp: 96.8 °F (36 °C)   SpO2: 98%   Weight: 54.4 kg (120 lb)   Height: 162.6 cm (64\")   PainSc:   2   PainLoc: Back     Estimated body mass index is 20.6 kg/m² as calculated from the following:    Height as of this encounter: 162.6 cm (64\").    Weight as of this encounter: 54.4 kg (120 lb).    BMI is within normal parameters. No other follow-up for BMI required.    Physical Exam  Vitals and nursing note reviewed.   Constitutional:       General: She is not in acute distress.     Appearance: Normal appearance. She is well-developed.   HENT:      Head: Normocephalic.      Right Ear: Tympanic membrane and ear canal normal. There is no impacted cerumen.      Left Ear: Tympanic membrane and ear canal normal. There is no impacted cerumen.      Nose: Nose normal.   Eyes:      Conjunctiva/sclera: Conjunctivae normal.      Pupils: Pupils are equal, round, and reactive to light.   Neck:      Vascular: No carotid bruit.   Cardiovascular:      Rate and Rhythm: " Normal rate and regular rhythm.      Heart sounds: Normal heart sounds. No murmur heard.  Pulmonary:      Effort: Pulmonary effort is normal. No respiratory distress.      Breath sounds: Normal breath sounds.   Abdominal:      General: Abdomen is flat. Bowel sounds are normal. There is no distension.      Tenderness: There is no abdominal tenderness.   Musculoskeletal:         General: Normal range of motion.   Skin:     General: Skin is warm and dry.      Findings: No rash.      Comments: Erythematous umbilicus with a clear border, smooth interior, serosanguineous production that extends about 2 cm deep.  No fissure or opening identified into the abdominal wall.   Neurological:      Mental Status: She is alert and oriented to person, place, and time.   Psychiatric:         Behavior: Behavior normal.         Thought Content: Thought content normal.         Judgment: Judgment normal.           Does the patient have evidence of cognitive impairment?   No            HEALTH RISK ASSESSMENT    Smoking Status:  Social History     Tobacco Use   Smoking Status Never   Smokeless Tobacco Never     Alcohol Consumption:  Social History     Substance and Sexual Activity   Alcohol Use No     Fall Risk Screen:    [facility] Fall Risk Assessment was completed, and patient is at LOW risk for falls.Assessment completed on:2023    Depression Screenin/11/2023     9:20 AM   PHQ-2/PHQ-9 Depression Screening   Little Interest or Pleasure in Doing Things 0-->not at all   Feeling Down, Depressed or Hopeless 0-->not at all   PHQ-9: Brief Depression Severity Measure Score 0       Health Habits and Functional and Cognitive Screenin/11/2023     9:20 AM   Functional & Cognitive Status   Do you have difficulty preparing food and eating? No   Do you have difficulty bathing yourself, getting dressed or grooming yourself? No   Do you have difficulty using the toilet? No   Do you have difficulty moving around from place to place?  No   Do you have trouble with steps or getting out of a bed or a chair? Yes   Current Diet Limited Junk Food   Dental Exam Not up to date   Eye Exam Up to date   Exercise (times per week) 3 times per week   Current Exercises Include Walking   Do you need help using the phone?  No   Are you deaf or do you have serious difficulty hearing?  Yes   Do you need help with transportation? Yes   Do you need help shopping? Yes   Do you need help preparing meals?  No   Do you need help with housework?  No   Do you need help with laundry? No   Do you need help taking your medications? No   Do you need help managing money? No   Do you ever drive or ride in a car without wearing a seat belt? No   Have you felt unusual stress, anger or loneliness in the last month? No   Who do you live with? Child   If you need help, do you have trouble finding someone available to you? No   Have you been bothered in the last four weeks by sexual problems? No   Do you have difficulty concentrating, remembering or making decisions? Yes       Age-appropriate Screening Schedule:  Refer to the list below for future screening recommendations based on patient's age, sex and/or medical conditions. Orders for these recommended tests are listed in the plan section. The patient has been provided with a written plan.    Health Maintenance   Topic Date Due   • COVID-19 Vaccine (5 - Booster for Pfizer series) 07/10/2022   • ZOSTER VACCINE (2 of 2) 02/09/2023   • INFLUENZA VACCINE  08/01/2023   • LIPID PANEL  09/22/2023   • ANNUAL WELLNESS VISIT  04/11/2024   • TDAP/TD VACCINES (2 - Tdap) 02/08/2028   • Pneumococcal Vaccine 65+  Completed   • MAMMOGRAM  Discontinued   • DXA SCAN  Discontinued                  CMS Preventative Services Quick Reference  Risk Factors Identified During Encounter:    Hearing Problem: Up-to-date  Inactivity/Sedentary: Patient was advised to exercise at least 150 minutes a week per CDC recommendations.    The above risks/problems have  been discussed with the patient.  Pertinent information has been shared with the patient in the After Visit Summary.    Diagnoses and all orders for this visit:    1. Medicare annual wellness visit, subsequent (Primary)    2. Cellulitis of abdominal wall  -     mupirocin (BACTROBAN) 2 % ointment; Apply 1 application topically to the appropriate area as directed 3 (Three) Times a Day.  Dispense: 30 g; Refill: 2  -     Culture, Routine - Swab, Abdominal Wall    3. Vitamin D deficiency  -     Vitamin D,25-Hydroxy    4. Hyperlipidemia, unspecified hyperlipidemia type  -     Comprehensive Metabolic Panel  -     CBC & Differential  -     Lipid Panel  -     Vitamin D,25-Hydroxy  -     TSH Rfx On Abnormal To Free T4    5. Cerebrovascular disease  -     Comprehensive Metabolic Panel  -     CBC & Differential  -     Lipid Panel    Very pleasant 88-year-old female with history of CVA that is overall doing well.  She has not been falling, great appetite, her main complaint is macular degeneration which impairs her vision and substantial hearing loss despite hearing correction with hearing aids.  She is well supported by family.  She also has complaints of significant erythema and serosanguineous production from the umbilicus, no fissure identified on exam, will treat with mupirocin, culture collected.    Plan to continue labs as above, for now we will continue with aspirin and statin as she has had a stroke in the last 2 years.  She was seen by neurology.  We discussed if she starts to have more falls and becomes more frail we should consider stopping medication.    In terms of her overall outlook she is doing well, no other changes to plan.    Follow Up:   Next Medicare Wellness visit to be scheduled in 1 year.    Return in about 6 months (around 10/11/2023), or if symptoms worsen or fail to improve, for Recheck hyperlipidemia with history of stroke.    An After Visit Summary and PPPS were made available to the patient.  Haleigh  JANICE Nicolas MD

## 2023-04-12 ENCOUNTER — TELEPHONE (OUTPATIENT)
Dept: FAMILY MEDICINE CLINIC | Facility: CLINIC | Age: 88
End: 2023-04-12
Payer: MEDICARE

## 2023-04-12 DIAGNOSIS — L03.311 CELLULITIS OF ABDOMINAL WALL: ICD-10-CM

## 2023-04-14 LAB
BACTERIA SPEC CULT: NORMAL
MICROORGANISM/AGENT SPEC: NORMAL

## 2023-04-17 NOTE — PROGRESS NOTES
Please call patient back with results.  The urine culture has resulted as negative for UTI.  Please let us know if you have further questions.     Thank you

## 2023-04-19 ENCOUNTER — TELEPHONE (OUTPATIENT)
Dept: FAMILY MEDICINE CLINIC | Facility: CLINIC | Age: 88
End: 2023-04-19
Payer: MEDICARE

## 2023-04-19 NOTE — TELEPHONE ENCOUNTER
The culture has been submitted back to the provider for review of her dictation, will report to family when able.

## 2023-04-19 NOTE — TELEPHONE ENCOUNTER
Caller: Tish Sánchez    Relationship: Emergency Contact    Best call back number: [phone]    Caller requesting test results: TISH    What test was performed: CULTURE    When was the test performed: 4/11/23    Where was the test performed: OFFICE

## 2023-05-05 LAB
25(OH)D3+25(OH)D2 SERPL-MCNC: 51.3 NG/ML (ref 30–100)
ALBUMIN SERPL-MCNC: 4.7 G/DL (ref 3.5–5.2)
ALBUMIN/GLOB SERPL: 2 G/DL
ALP SERPL-CCNC: 60 U/L (ref 39–117)
ALT SERPL-CCNC: 19 U/L (ref 1–33)
AST SERPL-CCNC: 19 U/L (ref 1–32)
BASOPHILS # BLD AUTO: 0.04 10*3/MM3 (ref 0–0.2)
BASOPHILS NFR BLD AUTO: 0.5 % (ref 0–1.5)
BILIRUB SERPL-MCNC: 0.6 MG/DL (ref 0–1.2)
BUN SERPL-MCNC: 21 MG/DL (ref 8–23)
BUN/CREAT SERPL: 22.1 (ref 7–25)
CALCIUM SERPL-MCNC: 9.9 MG/DL (ref 8.6–10.5)
CHLORIDE SERPL-SCNC: 104 MMOL/L (ref 98–107)
CHOLEST SERPL-MCNC: 115 MG/DL (ref 0–200)
CO2 SERPL-SCNC: 26.1 MMOL/L (ref 22–29)
CREAT SERPL-MCNC: 0.95 MG/DL (ref 0.57–1)
EGFRCR SERPLBLD CKD-EPI 2021: 57.7 ML/MIN/1.73
EOSINOPHIL # BLD AUTO: 0.2 10*3/MM3 (ref 0–0.4)
EOSINOPHIL NFR BLD AUTO: 2.7 % (ref 0.3–6.2)
ERYTHROCYTE [DISTWIDTH] IN BLOOD BY AUTOMATED COUNT: 13.3 % (ref 12.3–15.4)
GLOBULIN SER CALC-MCNC: 2.3 GM/DL
GLUCOSE SERPL-MCNC: 98 MG/DL (ref 65–99)
HCT VFR BLD AUTO: 37.7 % (ref 34–46.6)
HDLC SERPL-MCNC: 58 MG/DL (ref 40–60)
HGB BLD-MCNC: 12.3 G/DL (ref 12–15.9)
IMM GRANULOCYTES # BLD AUTO: 0.02 10*3/MM3 (ref 0–0.05)
IMM GRANULOCYTES NFR BLD AUTO: 0.3 % (ref 0–0.5)
LDLC SERPL CALC-MCNC: 41 MG/DL (ref 0–100)
LYMPHOCYTES # BLD AUTO: 2.11 10*3/MM3 (ref 0.7–3.1)
LYMPHOCYTES NFR BLD AUTO: 28.6 % (ref 19.6–45.3)
MCH RBC QN AUTO: 30.1 PG (ref 26.6–33)
MCHC RBC AUTO-ENTMCNC: 32.6 G/DL (ref 31.5–35.7)
MCV RBC AUTO: 92.2 FL (ref 79–97)
MONOCYTES # BLD AUTO: 0.61 10*3/MM3 (ref 0.1–0.9)
MONOCYTES NFR BLD AUTO: 8.3 % (ref 5–12)
NEUTROPHILS # BLD AUTO: 4.41 10*3/MM3 (ref 1.7–7)
NEUTROPHILS NFR BLD AUTO: 59.6 % (ref 42.7–76)
NRBC BLD AUTO-RTO: 0 /100 WBC (ref 0–0.2)
PLATELET # BLD AUTO: 196 10*3/MM3 (ref 140–450)
POTASSIUM SERPL-SCNC: 4.9 MMOL/L (ref 3.5–5.2)
PROT SERPL-MCNC: 7 G/DL (ref 6–8.5)
RBC # BLD AUTO: 4.09 10*6/MM3 (ref 3.77–5.28)
SODIUM SERPL-SCNC: 142 MMOL/L (ref 136–145)
TRIGL SERPL-MCNC: 78 MG/DL (ref 0–150)
TSH SERPL DL<=0.005 MIU/L-ACNC: 2.45 UIU/ML (ref 0.27–4.2)
VLDLC SERPL CALC-MCNC: 16 MG/DL (ref 5–40)
WBC # BLD AUTO: 7.39 10*3/MM3 (ref 3.4–10.8)

## 2023-08-26 DIAGNOSIS — K21.9 GASTROESOPHAGEAL REFLUX DISEASE WITHOUT ESOPHAGITIS: ICD-10-CM

## 2023-08-28 RX ORDER — FAMOTIDINE 40 MG/1
TABLET, FILM COATED ORAL
Qty: 90 TABLET | Refills: 1 | Status: SHIPPED | OUTPATIENT
Start: 2023-08-28

## 2023-08-28 NOTE — TELEPHONE ENCOUNTER
Rx Refill Note  Requested Prescriptions     Pending Prescriptions Disp Refills    famotidine (PEPCID) 40 MG tablet [Pharmacy Med Name: Famotidine 40 MG Oral Tablet] 90 tablet 3     Sig: TAKE 1 TABLET AT NIGHT AS NEEDED FOR HEARTBURN      Last office visit with prescribing clinician: 4/11/2023   Last telemedicine visit with prescribing clinician: Visit date not found   Next office visit with prescribing clinician: 10/12/2023                         Would you like a call back once the refill request has been completed: [] Yes [] No    If the office needs to give you a call back, can they leave a voicemail: [] Yes [] No    Taya Newell MA/DARREN  08/28/23, 07:58 EDT

## 2023-09-02 ENCOUNTER — APPOINTMENT (OUTPATIENT)
Dept: GENERAL RADIOLOGY | Facility: HOSPITAL | Age: 88
End: 2023-09-02
Payer: MEDICARE

## 2023-09-02 ENCOUNTER — HOSPITAL ENCOUNTER (EMERGENCY)
Facility: HOSPITAL | Age: 88
Discharge: ANOTHER HOSPITAL | End: 2023-09-03
Attending: EMERGENCY MEDICINE | Admitting: EMERGENCY MEDICINE
Payer: MEDICARE

## 2023-09-02 DIAGNOSIS — M65.9 FLEXOR TENOSYNOVITIS OF FINGER: Primary | ICD-10-CM

## 2023-09-02 PROCEDURE — 73130 X-RAY EXAM OF HAND: CPT

## 2023-09-02 PROCEDURE — 36415 COLL VENOUS BLD VENIPUNCTURE: CPT

## 2023-09-02 PROCEDURE — 99284 EMERGENCY DEPT VISIT MOD MDM: CPT

## 2023-09-02 RX ORDER — SODIUM CHLORIDE 0.9 % (FLUSH) 0.9 %
10 SYRINGE (ML) INJECTION AS NEEDED
Status: DISCONTINUED | OUTPATIENT
Start: 2023-09-02 | End: 2023-09-03 | Stop reason: HOSPADM

## 2023-09-02 RX ORDER — ACETAMINOPHEN 500 MG
1000 TABLET ORAL ONCE
Status: COMPLETED | OUTPATIENT
Start: 2023-09-02 | End: 2023-09-03

## 2023-09-02 RX ORDER — VANCOMYCIN HYDROCHLORIDE 1 G/200ML
20 INJECTION, SOLUTION INTRAVENOUS ONCE
Status: DISCONTINUED | OUTPATIENT
Start: 2023-09-03 | End: 2023-09-03

## 2023-09-03 VITALS
SYSTOLIC BLOOD PRESSURE: 147 MMHG | RESPIRATION RATE: 16 BRPM | DIASTOLIC BLOOD PRESSURE: 91 MMHG | HEART RATE: 77 BPM | OXYGEN SATURATION: 96 % | WEIGHT: 120 LBS | TEMPERATURE: 99.2 F | HEIGHT: 64 IN | BODY MASS INDEX: 20.49 KG/M2

## 2023-09-03 LAB
ALBUMIN SERPL-MCNC: 4.3 G/DL (ref 3.5–5.2)
ALBUMIN/GLOB SERPL: 1.7 G/DL
ALP SERPL-CCNC: 48 U/L (ref 39–117)
ALT SERPL W P-5'-P-CCNC: 14 U/L (ref 1–33)
ANION GAP SERPL CALCULATED.3IONS-SCNC: 11.6 MMOL/L (ref 5–15)
AST SERPL-CCNC: 17 U/L (ref 1–32)
BASOPHILS # BLD AUTO: 0.03 10*3/MM3 (ref 0–0.2)
BASOPHILS NFR BLD AUTO: 0.2 % (ref 0–1.5)
BILIRUB SERPL-MCNC: 0.5 MG/DL (ref 0–1.2)
BUN SERPL-MCNC: 24 MG/DL (ref 8–23)
BUN/CREAT SERPL: 27.3 (ref 7–25)
CALCIUM SPEC-SCNC: 9.1 MG/DL (ref 8.6–10.5)
CHLORIDE SERPL-SCNC: 108 MMOL/L (ref 98–107)
CO2 SERPL-SCNC: 23.4 MMOL/L (ref 22–29)
CREAT SERPL-MCNC: 0.88 MG/DL (ref 0.57–1)
D-LACTATE SERPL-SCNC: 0.9 MMOL/L (ref 0.5–2)
DEPRECATED RDW RBC AUTO: 45.1 FL (ref 37–54)
EGFRCR SERPLBLD CKD-EPI 2021: 63.3 ML/MIN/1.73
EOSINOPHIL # BLD AUTO: 0.08 10*3/MM3 (ref 0–0.4)
EOSINOPHIL NFR BLD AUTO: 0.6 % (ref 0.3–6.2)
ERYTHROCYTE [DISTWIDTH] IN BLOOD BY AUTOMATED COUNT: 13.2 % (ref 12.3–15.4)
GLOBULIN UR ELPH-MCNC: 2.6 GM/DL
GLUCOSE SERPL-MCNC: 137 MG/DL (ref 65–99)
HCT VFR BLD AUTO: 34.5 % (ref 34–46.6)
HGB BLD-MCNC: 11.6 G/DL (ref 12–15.9)
IMM GRANULOCYTES # BLD AUTO: 0.05 10*3/MM3 (ref 0–0.05)
IMM GRANULOCYTES NFR BLD AUTO: 0.4 % (ref 0–0.5)
LYMPHOCYTES # BLD AUTO: 1.39 10*3/MM3 (ref 0.7–3.1)
LYMPHOCYTES NFR BLD AUTO: 9.8 % (ref 19.6–45.3)
MCH RBC QN AUTO: 31.4 PG (ref 26.6–33)
MCHC RBC AUTO-ENTMCNC: 33.6 G/DL (ref 31.5–35.7)
MCV RBC AUTO: 93.2 FL (ref 79–97)
MONOCYTES # BLD AUTO: 1.28 10*3/MM3 (ref 0.1–0.9)
MONOCYTES NFR BLD AUTO: 9 % (ref 5–12)
NEUTROPHILS NFR BLD AUTO: 11.41 10*3/MM3 (ref 1.7–7)
NEUTROPHILS NFR BLD AUTO: 80 % (ref 42.7–76)
NRBC BLD AUTO-RTO: 0 /100 WBC (ref 0–0.2)
PLATELET # BLD AUTO: 176 10*3/MM3 (ref 140–450)
PMV BLD AUTO: 10.8 FL (ref 6–12)
POTASSIUM SERPL-SCNC: 4 MMOL/L (ref 3.5–5.2)
PROT SERPL-MCNC: 6.9 G/DL (ref 6–8.5)
RBC # BLD AUTO: 3.7 10*6/MM3 (ref 3.77–5.28)
SODIUM SERPL-SCNC: 143 MMOL/L (ref 136–145)
WBC NRBC COR # BLD: 14.24 10*3/MM3 (ref 3.4–10.8)

## 2023-09-03 PROCEDURE — 85025 COMPLETE CBC W/AUTO DIFF WBC: CPT | Performed by: EMERGENCY MEDICINE

## 2023-09-03 PROCEDURE — 80053 COMPREHEN METABOLIC PANEL: CPT | Performed by: EMERGENCY MEDICINE

## 2023-09-03 PROCEDURE — 87040 BLOOD CULTURE FOR BACTERIA: CPT | Performed by: EMERGENCY MEDICINE

## 2023-09-03 PROCEDURE — 83605 ASSAY OF LACTIC ACID: CPT | Performed by: EMERGENCY MEDICINE

## 2023-09-03 PROCEDURE — 25010000002 AMPICILLIN-SULBACTAM PER 1.5 G: Performed by: EMERGENCY MEDICINE

## 2023-09-03 PROCEDURE — 96365 THER/PROPH/DIAG IV INF INIT: CPT

## 2023-09-03 RX ADMIN — ACETAMINOPHEN 1000 MG: 500 TABLET ORAL at 00:35

## 2023-09-03 RX ADMIN — AMPICILLIN SODIUM AND SULBACTAM SODIUM 3 G: 2; 1 INJECTION, POWDER, FOR SOLUTION INTRAMUSCULAR; INTRAVENOUS at 01:13

## 2023-09-03 NOTE — ED PROVIDER NOTES
EMERGENCY DEPARTMENT ENCOUNTER    Room Number:  18/18  PCP: Haleigh Nicolas MD      HPI:  Chief Complaint: Right hand redness  A complete HPI/ROS/PMH/PSH/SH/FH are unobtainable due to: None  Context: Allison Fitzpatrick is a 88 y.o. female who presents to the ED c/o right hand redness.  Patient states that she developed this yesterday.  It affected the right middle finger.  No fever.  Pain is 3/10 in intensity.  She sleeps with her cat.  No known bites.          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    HLD (hyperlipidemia) 03/28/2018    Chest pain 03/28/2018    Community acquired pneumonia of left lung 08/22/2020    HARRY (acute kidney injury) 08/23/2020    Acute ischemic stroke 08/23/2020    Cerebrovascular disease 12/03/2020    Medicare annual wellness visit, subsequent 12/14/2021    Fatigue 12/14/2021    Chest wall tenderness 12/14/2021     Resolved Ambulatory Problems     Diagnosis Date Noted    Syncope and collapse 03/28/2018    Fracture of one rib of left side 03/28/2018     Past Medical History:   Diagnosis Date    Arthritis     Arthritis     Bladder problem     Dementia     Difficulty walking     Diverticulitis     Diverticulosis     Hard of hearing     Headache, tension-type     History of colon polyps     Hypertension     Irregular heart beat     Macular degeneration     Memory loss     Sleep apnea          PAST SURGICAL HISTORY  Past Surgical History:   Procedure Laterality Date    ABDOMINAL SURGERY      CATARACT EXTRACTION Bilateral     COLONOSCOPY N/A 6/6/2016    ih, tics, hp polyp     COLONOSCOPY W/ POLYPECTOMY      ENDOSCOPY N/A 6/6/2016    nml     HYSTERECTOMY      TONSILLECTOMY           FAMILY HISTORY  Family History   Problem Relation Age  of Onset    Colon cancer Mother     Arthritis Mother     Dementia Mother     Diabetes type II Sister     Diabetes type II Brother     Stomach cancer Brother     Coronary artery disease Father 60         MI in 60s    Breast cancer Neg Hx     Stroke Neg Hx          SOCIAL HISTORY  Social History     Socioeconomic History    Marital status:    Tobacco Use    Smoking status: Never    Smokeless tobacco: Never   Substance and Sexual Activity    Alcohol use: No    Drug use: No    Sexual activity: Defer         ALLERGIES  Patient has no known allergies.        REVIEW OF SYSTEMS  Review of Systems     All systems reviewed and negative except for those discussed in HPI.       PHYSICAL EXAM  ED Triage Vitals   Temp Heart Rate Resp BP SpO2   239 230 23   99.9 °F (37.7 °C) 89 19 145/74 93 %      Temp src Heart Rate Source Patient Position BP Location FiO2 (%)   -- -- -- -- --              Physical Exam      GENERAL: no acute distress  HENT: nares patent  EYES: no scleral icterus  CV: regular rhythm, normal rate  RESPIRATORY: normal effort  MUSCULOSKELETAL: no deformity  NEURO: alert, moves all extremities, follows commands  PSYCH:  calm, cooperative  SKIN: Lymphangitic streaking up to the proximal right forearm          Vital signs and nursing notes reviewed.          LAB RESULTS  Recent Results (from the past 24 hour(s))   Comprehensive Metabolic Panel    Collection Time: 23 12:05 AM    Specimen: Blood   Result Value Ref Range    Glucose 137 (H) 65 - 99 mg/dL    BUN 24 (H) 8 - 23 mg/dL    Creatinine 0.88 0.57 - 1.00 mg/dL    Sodium 143 136 - 145 mmol/L    Potassium 4.0 3.5 - 5.2 mmol/L    Chloride 108 (H) 98 - 107 mmol/L    CO2 23.4 22.0 - 29.0 mmol/L    Calcium 9.1 8.6 - 10.5 mg/dL    Total Protein 6.9 6.0 - 8.5 g/dL    Albumin 4.3 3.5 - 5.2 g/dL    ALT (SGPT) 14 1 - 33 U/L    AST (SGOT) 17 1 - 32 U/L    Alkaline Phosphatase 48 39 - 117 U/L    Total  Bilirubin 0.5 0.0 - 1.2 mg/dL    Globulin 2.6 gm/dL    A/G Ratio 1.7 g/dL    BUN/Creatinine Ratio 27.3 (H) 7.0 - 25.0    Anion Gap 11.6 5.0 - 15.0 mmol/L    eGFR 63.3 >60.0 mL/min/1.73   Lactic Acid, Plasma    Collection Time: 09/03/23 12:05 AM    Specimen: Blood   Result Value Ref Range    Lactate 0.9 0.5 - 2.0 mmol/L   CBC Auto Differential    Collection Time: 09/03/23 12:05 AM    Specimen: Blood   Result Value Ref Range    WBC 14.24 (H) 3.40 - 10.80 10*3/mm3    RBC 3.70 (L) 3.77 - 5.28 10*6/mm3    Hemoglobin 11.6 (L) 12.0 - 15.9 g/dL    Hematocrit 34.5 34.0 - 46.6 %    MCV 93.2 79.0 - 97.0 fL    MCH 31.4 26.6 - 33.0 pg    MCHC 33.6 31.5 - 35.7 g/dL    RDW 13.2 12.3 - 15.4 %    RDW-SD 45.1 37.0 - 54.0 fl    MPV 10.8 6.0 - 12.0 fL    Platelets 176 140 - 450 10*3/mm3    Neutrophil % 80.0 (H) 42.7 - 76.0 %    Lymphocyte % 9.8 (L) 19.6 - 45.3 %    Monocyte % 9.0 5.0 - 12.0 %    Eosinophil % 0.6 0.3 - 6.2 %    Basophil % 0.2 0.0 - 1.5 %    Immature Grans % 0.4 0.0 - 0.5 %    Neutrophils, Absolute 11.41 (H) 1.70 - 7.00 10*3/mm3    Lymphocytes, Absolute 1.39 0.70 - 3.10 10*3/mm3    Monocytes, Absolute 1.28 (H) 0.10 - 0.90 10*3/mm3    Eosinophils, Absolute 0.08 0.00 - 0.40 10*3/mm3    Basophils, Absolute 0.03 0.00 - 0.20 10*3/mm3    Immature Grans, Absolute 0.05 0.00 - 0.05 10*3/mm3    nRBC 0.0 0.0 - 0.2 /100 WBC       Ordered the above labs and reviewed the results.        RADIOLOGY  XR Hand 3+ View Right    Result Date: 9/2/2023  3 VIEWS RIGHT HAND  HISTORY: Right hand swelling  COMPARISON: None available.  FINDINGS: No acute fracture or subluxation of the right hand is seen. There is degenerative change noted throughout the right hand, most significantly involving the carpometacarpal joint of the thumb, as well as the interphalangeal joints. The patient has focal soft tissue swelling involving the lateral aspect of the middle finger, extending from the distal proximal phalanx to the distal middle phalanx. No aggressive  osseous abnormalities are seen. No retained foreign body is identified.      Focal soft tissue swelling involving the lateral aspect of the middle finger, without underlying osseous abnormality seen.  This report was finalized on 9/2/2023 11:44 PM by Dr. Vivian Veliz M.D.       Ordered the above noted radiological studies. Reviewed by me in PACS.          PROCEDURES  Procedures        MEDICATIONS GIVEN IN ER  Medications   sodium chloride 0.9 % flush 10 mL (has no administration in time range)   ampicillin-sulbactam (UNASYN) 3 g in sodium chloride 0.9 % 100 mL IVPB-VTB (3 g Intravenous New Bag 9/3/23 0113)   acetaminophen (TYLENOL) tablet 1,000 mg (1,000 mg Oral Given 9/3/23 0035)         MEDICAL DECISION MAKING, PROGRESS, and CONSULTS    Discussion below represents my analysis of pertinent findings related to patient's condition, differential diagnosis, treatment plan and final disposition.      Orders placed during this visit:  Orders Placed This Encounter   Procedures    Blood Culture - Blood,    Blood Culture - Blood,    XR Hand 3+ View Right    Comprehensive Metabolic Panel    Lactic Acid, Plasma    CBC Auto Differential    Monitor Blood Pressure    Pulse Oximetry, Continuous    Insert Peripheral IV    CBC & Differential         Additional sources:  - Discussed/obtained information from independent historians: Family at bedside  Additional information was obtained to confirm the patient's history.          Differential diagnosis:    necrotizing fasciitis, flexor tenosynovitis, cellulitis, cat scratch disease      Independent interpretation of labs, radiology studies, and discussions with consultants:  ED Course as of 09/03/23 0218   Sat Sep 02, 2023   2320 Tdap is up to date [TD]   2359 X-ray of the right hand independently interpreted by myself.  There is soft tissue swelling to the right third digit.  No osseous abnormality. [TD]   Sun Sep 03, 2023   0040 WBC(!): 14.24 [TD]   0040 Hemoglobin(!): 11.6  [TD]   0053 Creatinine: 0.88 [TD]   0053 Sodium: 143 [TD]   0053 Potassium: 4.0 [TD]   0053 Lactate: 0.9 [TD]      ED Course User Index  [TD] Ted Funes II, MD         I discussed the case with Dr. Mic Dobson, hand surgery at [facility].  He will see the patient in consultation for recommends medicine admission.    I then spoke with the on-call PA who is excepted the patient for Dr. Sanders.      DIAGNOSIS  Final diagnoses:   Flexor tenosynovitis of finger         DISPOSITION  Transfer to [facility]      Latest Documented Vital Signs:  As of 02:18 EDT  BP- 147/91 HR- 77 Temp- 99.2 °F (37.3 °C) (Oral) O2 sat- 96%      --    Please note that portions of this were completed with a voice recognition program.       Note Disclaimer: At [facility], we believe that sharing information builds trust and better relationships. You are receiving this note because you are receiving care at [facility] or recently visited. It is possible you will see health information before a provider has talked with you about it. This kind of information can be easy to misunderstand. To help you fully understand what it means for your health, we urge you to discuss this note with your provider.         Ted Funes II, MD  09/03/23 0215

## 2023-09-03 NOTE — ED TRIAGE NOTES
Pt presents to ED via pv complaining of an injury to her right hand. She has a large hematoma to her middle finger on the right hand that appears to be blistered, surrounded by some redness and swelling. The area is warm to the touch. Pt does not recall any injury to her hand, but noticed it yesterday. Pt is hard of hearing and legally blind, daughter is with her.

## 2023-09-03 NOTE — ED NOTES
Report given to [facility] RN, Rachele, at this time. Patient will be coming by private vehicle with the daughter as . Face sheet has been faxed to [facility], fax # [phone]. Instructed by  to hold off on vancomycin so pt can come by private vehicle to [place]. Unasyn is finishing infusing IV.

## 2023-09-05 ENCOUNTER — READMISSION MANAGEMENT (OUTPATIENT)
Dept: CALL CENTER | Facility: HOSPITAL | Age: 88
End: 2023-09-05
Payer: MEDICARE

## 2023-09-05 NOTE — OUTREACH NOTE
Prep Survey      Flowsheet Row Responses   [facility] patient discharged from? Non-BH   Is LACE score < 7 ? Non- Discharge   Eligibility [facility]   Date of Discharge 09/04/23   Discharge diagnosis Animal bite of right hand with infection   Does the patient have one of the following disease processes/diagnoses(primary or secondary)? Other   Prep survey completed? Yes            Patricia LAL - Registered Nurse

## 2023-09-06 ENCOUNTER — TRANSITIONAL CARE MANAGEMENT TELEPHONE ENCOUNTER (OUTPATIENT)
Dept: CALL CENTER | Facility: HOSPITAL | Age: 88
End: 2023-09-06
Payer: MEDICARE

## 2023-09-06 NOTE — OUTREACH NOTE
Call Center TCM Note      Flowsheet Row Responses   [facility] patient discharged from? Non-BH   Does the patient have one of the following disease processes/diagnoses(primary or secondary)? Other   TCM attempt successful? No   Unsuccessful attempts Attempt 1            Julianne Person RN    9/6/2023, 12:46 EDT

## 2023-09-06 NOTE — OUTREACH NOTE
Call Center TCM Note      Flowsheet Row Responses   [facility] [place] patient discharged from? Non-BH   Does the patient have one of the following disease processes/diagnoses(primary or secondary)? Other   TCM attempt successful? No   Unsuccessful attempts Attempt 2            Julianne Person RN    9/6/2023, 15:22 EDT

## 2023-09-07 ENCOUNTER — TRANSITIONAL CARE MANAGEMENT TELEPHONE ENCOUNTER (OUTPATIENT)
Dept: CALL CENTER | Facility: HOSPITAL | Age: 88
End: 2023-09-07
Payer: MEDICARE

## 2023-09-07 NOTE — OUTREACH NOTE
Call Center TCM Note      Flowsheet Row Responses   [facility] patient discharged from? Non-BH   Does the patient have one of the following disease processes/diagnoses(primary or secondary)? Other   TCM attempt successful? Yes   Call start time 0836   Call end time 0837   Discharge diagnosis Animal bite of right hand with infection   Is patient permission given to speak with other caregiver? Yes   List who call center can speak with Tish Daughter   Person spoke with today (if not patient) and relationship Tish Daughter   Meds reviewed with patient/caregiver? Yes   Is the patient having any side effects they believe may be caused by any medication additions or changes? No   Does the patient have all medications ordered at discharge? Yes   Is the patient taking all medications as directed (includes completed medication regime)? Yes   Comments Daughter would like to speak with hand surgeon at apt next week before scheduling a FU with patient's PCP   Does the patient have an appointment with their PCP within 7-14 days of discharge? No   Nursing Interventions Patient declined scheduling/rescheduling appointment at this time, Routed TCM call to PCP office, PCP office requested to make appointment - message sent   Has home health visited the patient within 72 hours of discharge? N/A   Psychosocial issues? No   Did the patient receive a copy of their discharge instructions? Yes   Nursing interventions Reviewed instructions with patient   What is the patient's perception of their health status since discharge? Improving   Is the patient/caregiver able to teach back signs and symptoms related to disease process for when to call PCP? Yes   Is the patient/caregiver able to teach back signs and symptoms related to disease process for when to call 911? Yes   Is the patient/caregiver able to teach back the hierarchy of who to call/visit for symptoms/problems? PCP, Specialist, Home health nurse, Urgent Care, ED, 911 Yes    If the patient is a current smoker, are they able to teach back resources for cessation? Not a smoker   TCM call completed? Yes   Call end time 0837            Hiral Guadarrama RN    9/7/2023, 08:40 EDT

## 2023-09-08 LAB
BACTERIA SPEC AEROBE CULT: NORMAL
BACTERIA SPEC AEROBE CULT: NORMAL

## 2023-10-12 ENCOUNTER — OFFICE VISIT (OUTPATIENT)
Dept: FAMILY MEDICINE CLINIC | Facility: CLINIC | Age: 88
End: 2023-10-12
Payer: MEDICARE

## 2023-10-12 VITALS
TEMPERATURE: 97.8 F | WEIGHT: 113 LBS | SYSTOLIC BLOOD PRESSURE: 124 MMHG | DIASTOLIC BLOOD PRESSURE: 60 MMHG | OXYGEN SATURATION: 98 % | HEIGHT: 64 IN | HEART RATE: 70 BPM | BODY MASS INDEX: 19.29 KG/M2

## 2023-10-12 DIAGNOSIS — R26.2 DIFFICULTY IN WALKING, NOT ELSEWHERE CLASSIFIED: ICD-10-CM

## 2023-10-12 DIAGNOSIS — E78.41 ELEVATED LIPOPROTEIN(A): Primary | ICD-10-CM

## 2023-10-12 DIAGNOSIS — R29.898 WEAKNESS OF BOTH LOWER EXTREMITIES: ICD-10-CM

## 2023-10-12 DIAGNOSIS — H61.23 BILATERAL HEARING LOSS DUE TO CERUMEN IMPACTION: ICD-10-CM

## 2023-10-12 DIAGNOSIS — Z86.73 HISTORY OF CVA (CEREBROVASCULAR ACCIDENT): ICD-10-CM

## 2023-10-12 PROBLEM — F03.90 DEMENTIA: Status: ACTIVE | Noted: 2023-09-03

## 2023-10-12 NOTE — PROGRESS NOTES
"Chief Complaint  Hyperlipidemia ( No complains  doing well no complains ) and Stroke (Follow up  ,pt would like to have ears check  since hearing getting worse )    Subjective        Allison Fitzpatrick presents to [facility]  Hyperlipidemia    Stroke      Pleasant 88-year-old female here to follow-up hyperlipidemia and history of stroke.  Thankfully she is doing well overall, she did get bit by a dog and was seen in the ER for this but has not had any complications from this.  Stroke occurred in 2020.    Objective   Vital Signs:  /60   Pulse 70   Temp 97.8 øF (36.6 øC)   Ht 162.6 cm (64\")   Wt 51.3 kg (113 lb)   SpO2 98%   BMI 19.40 kg/mý   Estimated body mass index is 19.4 kg/mý as calculated from the following:    Height as of this encounter: 162.6 cm (64\").    Weight as of this encounter: 51.3 kg (113 lb).       BMI is within normal parameters. No other follow-up for BMI required.      Physical Exam  Vitals and nursing note reviewed.   Constitutional:       General: She is not in acute distress.     Appearance: She is well-developed.   HENT:      Head: Normocephalic.      Nose: Nose normal.   Cardiovascular:      Rate and Rhythm: Normal rate and regular rhythm.      Heart sounds: Normal heart sounds. No murmur heard.  Pulmonary:      Effort: Pulmonary effort is normal. No respiratory distress.      Breath sounds: Normal breath sounds.   Musculoskeletal:         General: Normal range of motion.   Skin:     General: Skin is warm and dry.      Findings: No rash.   Neurological:      Mental Status: She is alert and oriented to person, place, and time.   Psychiatric:         Behavior: Behavior normal.         Thought Content: Thought content normal.         Judgment: Judgment normal.        Result Review :  The following data was reviewed by: Haleigh Nicolas MD on 10/12/2023:  Labs performed 9/24/2023 at [facility] when she was at the emergency room:  - CBC: Mildly low " hemoglobin that is overall stable at 11.8, remaining CBC reassuring,  - BMP with mildly elevated sodium and chloride but normal renal function  - Normal PTT  - Normal phosphorus and magnesium          Ear Cerumen Removal    Date/Time: 10/12/2023 9:42 AM    Performed by: Haleigh Nicolas MD  Authorized by: Haleigh Nicolas MD  Consent: Verbal consent obtained.  Risks and benefits: risks, benefits and alternatives were discussed  Consent given by: patient  Patient understanding: patient states understanding of the procedure being performed  Patient identity confirmed: verbally with patient  Location details: right ear and left ear  Patient tolerance: Patient tolerated the procedure well with no immediate complications  Procedure type: irrigation           Assessment and Plan   Diagnoses and all orders for this visit:    1. Elevated lipoprotein(a) (Primary)    2. History of CVA (cerebrovascular accident)    3. Bilateral hearing loss due to cerumen impaction  -     Ear Cerumen Removal    4. Difficulty in walking, not elsewhere classified    5. Weakness of both lower extremities    Very pleasant 88-year-old female here with her daughter Tish, overall doing well, her main complaint today is difficulty hearing even with her hearing aids, cerumen removed with minimal improvement to her hearing.    Hyperlipidemia well-controlled on statin, no recurrent symptoms of stroke, last stroke in 2020.  Thankfully she is feeling well overall.  Reviewed labs with recent ER visit for a finger lesion that was attributed to a cat bite, but could be from another cause as well as the patient does not remember.  Dementia stable.    We talked briefly through incontinence, for now would not recommend oxybutynin as this could worsen her memory and increase sundowning.    We also talked through considering physical therapy to help with some of her strength but she seems to be improving, they are welcome to call for home therapy for  generalized weakness if needed.       Follow Up   Return in about 6 months (around 4/12/2024), or if symptoms worsen or fail to improve, for Medicare Wellness.  Patient was given instructions and counseling regarding her condition or for health maintenance advice. Please see specific information pulled into the AVS if appropriate.     Haleigh Nicolas MD

## 2023-11-19 DIAGNOSIS — I10 ESSENTIAL HYPERTENSION: ICD-10-CM

## 2023-11-19 DIAGNOSIS — I67.9 CEREBROVASCULAR DISEASE: ICD-10-CM

## 2023-11-20 RX ORDER — ATORVASTATIN CALCIUM 40 MG/1
40 TABLET, FILM COATED ORAL DAILY
Qty: 90 TABLET | Refills: 3 | Status: SHIPPED | OUTPATIENT
Start: 2023-11-20

## 2023-11-20 RX ORDER — AMLODIPINE BESYLATE 5 MG/1
5 TABLET ORAL DAILY
Qty: 90 TABLET | Refills: 3 | Status: SHIPPED | OUTPATIENT
Start: 2023-11-20

## 2023-12-18 ENCOUNTER — APPOINTMENT (OUTPATIENT)
Dept: GENERAL RADIOLOGY | Facility: HOSPITAL | Age: 88
End: 2023-12-18
Payer: MEDICARE

## 2023-12-18 ENCOUNTER — HOSPITAL ENCOUNTER (EMERGENCY)
Facility: HOSPITAL | Age: 88
Discharge: HOME | End: 2023-12-18
Attending: EMERGENCY MEDICINE | Admitting: EMERGENCY MEDICINE
Payer: MEDICARE

## 2023-12-18 ENCOUNTER — TELEPHONE (OUTPATIENT)
Dept: FAMILY MEDICINE CLINIC | Facility: CLINIC | Age: 88
End: 2023-12-18
Payer: MEDICARE

## 2023-12-18 VITALS
WEIGHT: 110 LBS | DIASTOLIC BLOOD PRESSURE: 80 MMHG | OXYGEN SATURATION: 95 % | RESPIRATION RATE: 16 BRPM | SYSTOLIC BLOOD PRESSURE: 162 MMHG | HEART RATE: 74 BPM | TEMPERATURE: 98.5 F | HEIGHT: 64 IN | BODY MASS INDEX: 18.78 KG/M2

## 2023-12-18 DIAGNOSIS — M79.89 LEG SWELLING: Primary | ICD-10-CM

## 2023-12-18 DIAGNOSIS — I10 HYPERTENSION, UNSPECIFIED TYPE: ICD-10-CM

## 2023-12-18 DIAGNOSIS — I50.30 DIASTOLIC CONGESTIVE HEART FAILURE, UNSPECIFIED HF CHRONICITY: ICD-10-CM

## 2023-12-18 LAB
ALBUMIN SERPL-MCNC: 4.4 G/DL (ref 3.5–5.2)
ALBUMIN/GLOB SERPL: 1.8 G/DL
ALP SERPL-CCNC: 52 U/L (ref 39–117)
ALT SERPL W P-5'-P-CCNC: 21 U/L (ref 1–33)
ANION GAP SERPL CALCULATED.3IONS-SCNC: 10.4 MMOL/L (ref 5–15)
AST SERPL-CCNC: 20 U/L (ref 1–32)
BASOPHILS # BLD AUTO: 0.02 10*3/MM3 (ref 0–0.2)
BASOPHILS NFR BLD AUTO: 0.4 % (ref 0–1.5)
BILIRUB SERPL-MCNC: 0.5 MG/DL (ref 0–1.2)
BUN SERPL-MCNC: 18 MG/DL (ref 8–23)
BUN/CREAT SERPL: 22 (ref 7–25)
CALCIUM SPEC-SCNC: 9.5 MG/DL (ref 8.6–10.5)
CHLORIDE SERPL-SCNC: 106 MMOL/L (ref 98–107)
CO2 SERPL-SCNC: 25.6 MMOL/L (ref 22–29)
CREAT SERPL-MCNC: 0.82 MG/DL (ref 0.57–1)
DEPRECATED RDW RBC AUTO: 44.4 FL (ref 37–54)
EGFRCR SERPLBLD CKD-EPI 2021: 68.5 ML/MIN/1.73
EOSINOPHIL # BLD AUTO: 0.15 10*3/MM3 (ref 0–0.4)
EOSINOPHIL NFR BLD AUTO: 2.8 % (ref 0.3–6.2)
ERYTHROCYTE [DISTWIDTH] IN BLOOD BY AUTOMATED COUNT: 12.8 % (ref 12.3–15.4)
GEN 5 2HR TROPONIN T REFLEX: 33 NG/L
GLOBULIN UR ELPH-MCNC: 2.5 GM/DL
GLUCOSE SERPL-MCNC: 87 MG/DL (ref 65–99)
HCT VFR BLD AUTO: 35.1 % (ref 34–46.6)
HGB BLD-MCNC: 11.6 G/DL (ref 12–15.9)
IMM GRANULOCYTES # BLD AUTO: 0 10*3/MM3 (ref 0–0.05)
IMM GRANULOCYTES NFR BLD AUTO: 0 % (ref 0–0.5)
LYMPHOCYTES # BLD AUTO: 1.59 10*3/MM3 (ref 0.7–3.1)
LYMPHOCYTES NFR BLD AUTO: 29.7 % (ref 19.6–45.3)
MCH RBC QN AUTO: 31.3 PG (ref 26.6–33)
MCHC RBC AUTO-ENTMCNC: 33 G/DL (ref 31.5–35.7)
MCV RBC AUTO: 94.6 FL (ref 79–97)
MONOCYTES # BLD AUTO: 0.57 10*3/MM3 (ref 0.1–0.9)
MONOCYTES NFR BLD AUTO: 10.7 % (ref 5–12)
NEUTROPHILS NFR BLD AUTO: 3.02 10*3/MM3 (ref 1.7–7)
NEUTROPHILS NFR BLD AUTO: 56.4 % (ref 42.7–76)
NRBC BLD AUTO-RTO: 0 /100 WBC (ref 0–0.2)
NT-PROBNP SERPL-MCNC: 334 PG/ML (ref 0–1800)
PLATELET # BLD AUTO: 178 10*3/MM3 (ref 140–450)
PMV BLD AUTO: 10.3 FL (ref 6–12)
POTASSIUM SERPL-SCNC: 3.9 MMOL/L (ref 3.5–5.2)
PROT SERPL-MCNC: 6.9 G/DL (ref 6–8.5)
QT INTERVAL: 378 MS
QTC INTERVAL: 405 MS
RBC # BLD AUTO: 3.71 10*6/MM3 (ref 3.77–5.28)
SODIUM SERPL-SCNC: 142 MMOL/L (ref 136–145)
TROPONIN T DELTA: -2 NG/L
TROPONIN T SERPL HS-MCNC: 35 NG/L
WBC NRBC COR # BLD AUTO: 5.35 10*3/MM3 (ref 3.4–10.8)

## 2023-12-18 PROCEDURE — 84484 ASSAY OF TROPONIN QUANT: CPT | Performed by: EMERGENCY MEDICINE

## 2023-12-18 PROCEDURE — 36415 COLL VENOUS BLD VENIPUNCTURE: CPT

## 2023-12-18 PROCEDURE — 83880 ASSAY OF NATRIURETIC PEPTIDE: CPT | Performed by: EMERGENCY MEDICINE

## 2023-12-18 PROCEDURE — 93005 ELECTROCARDIOGRAM TRACING: CPT | Performed by: EMERGENCY MEDICINE

## 2023-12-18 PROCEDURE — 80053 COMPREHEN METABOLIC PANEL: CPT | Performed by: EMERGENCY MEDICINE

## 2023-12-18 PROCEDURE — 71045 X-RAY EXAM CHEST 1 VIEW: CPT

## 2023-12-18 PROCEDURE — 99284 EMERGENCY DEPT VISIT MOD MDM: CPT

## 2023-12-18 PROCEDURE — 85025 COMPLETE CBC W/AUTO DIFF WBC: CPT | Performed by: EMERGENCY MEDICINE

## 2023-12-18 RX ORDER — SODIUM CHLORIDE 0.9 % (FLUSH) 0.9 %
10 SYRINGE (ML) INJECTION AS NEEDED
Status: DISCONTINUED | OUTPATIENT
Start: 2023-12-18 | End: 2023-12-18 | Stop reason: HOSPADM

## 2023-12-18 NOTE — TELEPHONE ENCOUNTER
Caller: Tish Sánchez    Relationship to patient: Emergency Contact    Best call back number: [phone]     Chief complaint: SWELLING IN BOTH FEET AND ANKLES, NUMBNESS    Patient directed to call 911 or go to their nearest emergency room.     Patient verbalized understanding: [x] Yes  [] No  If no, why?    Additional notes: PLEASE ADVISE

## 2023-12-18 NOTE — DISCHARGE INSTRUCTIONS
Elevate your legs whenever possible to help reduce swelling.  Please get knee-high compression stockings to wear during the day.  Remove these at night.  Be careful not to eat too much sodium as this can contribute to fluid retention.    If your symptoms persist, call your PCP to consider starting a diuretic medication.    If your symptoms worsen despite the above interventions or if you develop shortness of breath, return to the ER for repeat evaluation.

## 2023-12-18 NOTE — ED PROVIDER NOTES
EMERGENCY DEPARTMENT ENCOUNTER    Room Number:  33/33  PCP: Haleigh Nicolas MD  Historian: Patient, daughter      HPI:  Chief Complaint: Leg swelling  A complete HPI/ROS/PMH/PSH/SH/FH are unobtainable due to: Nothing  Context: Allison Fitzpatrick is a 89 y.o. female who presents to the ED c/o leg swelling for the last couple days.  She first noticed it in her right ankle and now the left ankle swollen as well.  She denies chest pain or shortness of breath.  She reports good urine output.  She tried to elevate her legs some last night.  She does not typically wear compression stockings.  She is on amlodipine but reports that she has been on this medication for years.  She denies fever or chills.            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    HLD (hyperlipidemia) 03/28/2018    Chest pain 03/28/2018    Community acquired pneumonia of left lung 08/22/2020    HARRY (acute kidney injury) 08/23/2020    Acute ischemic stroke 08/23/2020    Cerebrovascular disease 12/03/2020    Medicare annual wellness visit, subsequent 12/14/2021    Fatigue 12/14/2021    Chest wall tenderness 12/14/2021    Difficulty in walking, not elsewhere classified 08/29/2020    Dementia 09/03/2023     Resolved Ambulatory Problems     Diagnosis Date Noted    Syncope and collapse 03/28/2018    Fracture of one rib of left side 03/28/2018     Past Medical History:   Diagnosis Date    Arthritis     Arthritis     Bladder problem     Difficulty walking     Diverticulitis     Diverticulosis     Hard of hearing     Headache, tension-type     History of colon polyps     Hypertension     Irregular heart beat     Macular degeneration     Memory loss     Sleep apnea          PAST SURGICAL HISTORY  Past  Surgical History:   Procedure Laterality Date    ABDOMINAL SURGERY      CATARACT EXTRACTION Bilateral     COLONOSCOPY N/A 2016    ih, tics, hp polyp     COLONOSCOPY W/ POLYPECTOMY      ENDOSCOPY N/A 2016    nml     HYSTERECTOMY      TONSILLECTOMY           FAMILY HISTORY  Family History   Problem Relation Age of Onset    Colon cancer Mother     Arthritis Mother     Dementia Mother     Diabetes type II Sister     Diabetes type II Brother     Stomach cancer Brother     Coronary artery disease Father 60         MI in 60s    Breast cancer Neg Hx     Stroke Neg Hx          SOCIAL HISTORY  Social History     Socioeconomic History    Marital status:    Tobacco Use    Smoking status: Never   Substance and Sexual Activity    Alcohol use: No    Drug use: No    Sexual activity: Defer         ALLERGIES  Patient has no known allergies.        REVIEW OF SYSTEMS  Review of Systems   Review of all 14 systems is negative other than stated in the HPI above.      PHYSICAL EXAM  ED Triage Vitals   Temp Heart Rate Resp BP SpO2   23 1044 23 1044 23 1044 23 1105 23 1044   98.5 °F (36.9 °C) 73 16 155/73 96 %      Temp src Heart Rate Source Patient Position BP Location FiO2 (%)   23 1044 23 1044 23 1136 23 1136 --   Tympanic Monitor Lying Left arm        Physical Exam      GENERAL: Awake and alert, no acute distress, baseline hearing impairment  HENT: nares patent  EYES: no scleral icterus, EOMI  CV: regular rhythm, normal rate, no murmur appreciated  RESPIRATORY: normal effort, lungs clear auscultation bilaterally without wheezing or crackles  ABDOMEN: soft, nondistended, nontender throughout  MUSCULOSKELETAL: no deformity, 1+ pitting edema at the ankles bilaterally.  2+ DP and PT pulses bilateral lower extremities.  NEURO: alert, moves all extremities with normal strength and no drift, follows commands, cranial nerves II through XII grossly intact.  Slightly  diminished sensation light touch in the lower extremities secondary to neuropathy, which is baseline for patient.  PSYCH:  calm, cooperative  SKIN: warm, dry    Vital signs and nursing notes reviewed.          LAB RESULTS  Recent Results (from the past 24 hour(s))   Comprehensive Metabolic Panel    Collection Time: 12/18/23 11:13 AM    Specimen: Blood   Result Value Ref Range    Glucose 87 65 - 99 mg/dL    BUN 18 8 - 23 mg/dL    Creatinine 0.82 0.57 - 1.00 mg/dL    Sodium 142 136 - 145 mmol/L    Potassium 3.9 3.5 - 5.2 mmol/L    Chloride 106 98 - 107 mmol/L    CO2 25.6 22.0 - 29.0 mmol/L    Calcium 9.5 8.6 - 10.5 mg/dL    Total Protein 6.9 6.0 - 8.5 g/dL    Albumin 4.4 3.5 - 5.2 g/dL    ALT (SGPT) 21 1 - 33 U/L    AST (SGOT) 20 1 - 32 U/L    Alkaline Phosphatase 52 39 - 117 U/L    Total Bilirubin 0.5 0.0 - 1.2 mg/dL    Globulin 2.5 gm/dL    A/G Ratio 1.8 g/dL    BUN/Creatinine Ratio 22.0 7.0 - 25.0    Anion Gap 10.4 5.0 - 15.0 mmol/L    eGFR 68.5 >60.0 mL/min/1.73   BNP    Collection Time: 12/18/23 11:13 AM    Specimen: Blood   Result Value Ref Range    proBNP 334.0 0.0 - 1,800.0 pg/mL   High Sensitivity Troponin T    Collection Time: 12/18/23 11:13 AM    Specimen: Blood   Result Value Ref Range    HS Troponin T 35 (H) <14 ng/L   CBC Auto Differential    Collection Time: 12/18/23 11:13 AM    Specimen: Blood   Result Value Ref Range    WBC 5.35 3.40 - 10.80 10*3/mm3    RBC 3.71 (L) 3.77 - 5.28 10*6/mm3    Hemoglobin 11.6 (L) 12.0 - 15.9 g/dL    Hematocrit 35.1 34.0 - 46.6 %    MCV 94.6 79.0 - 97.0 fL    MCH 31.3 26.6 - 33.0 pg    MCHC 33.0 31.5 - 35.7 g/dL    RDW 12.8 12.3 - 15.4 %    RDW-SD 44.4 37.0 - 54.0 fl    MPV 10.3 6.0 - 12.0 fL    Platelets 178 140 - 450 10*3/mm3    Neutrophil % 56.4 42.7 - 76.0 %    Lymphocyte % 29.7 19.6 - 45.3 %    Monocyte % 10.7 5.0 - 12.0 %    Eosinophil % 2.8 0.3 - 6.2 %    Basophil % 0.4 0.0 - 1.5 %    Immature Grans % 0.0 0.0 - 0.5 %    Neutrophils, Absolute 3.02 1.70 - 7.00  10*3/mm3    Lymphocytes, Absolute 1.59 0.70 - 3.10 10*3/mm3    Monocytes, Absolute 0.57 0.10 - 0.90 10*3/mm3    Eosinophils, Absolute 0.15 0.00 - 0.40 10*3/mm3    Basophils, Absolute 0.02 0.00 - 0.20 10*3/mm3    Immature Grans, Absolute 0.00 0.00 - 0.05 10*3/mm3    nRBC 0.0 0.0 - 0.2 /100 WBC   ECG 12 Lead Other; feet swelling    Collection Time: 12/18/23 11:40 AM   Result Value Ref Range    QT Interval 378 ms    QTC Interval 405 ms   High Sensitivity Troponin T 2Hr    Collection Time: 12/18/23 12:06 PM    Specimen: Blood   Result Value Ref Range    HS Troponin T 33 (H) <14 ng/L    Troponin T Delta -2 >=-4 - <+4 ng/L       Ordered the above labs and reviewed the results.        RADIOLOGY  XR Chest 1 View    Result Date: 12/18/2023  XR CHEST 1 VW-12/18/2023  HISTORY: Feet swelling.  Heart size is at the upper limits of normal. There is elevation of the right hemidiaphragm. There is mild atelectasis of the right lung base. Minimal vascular congestion is seen. No pneumothorax is seen.   This report was finalized on 12/18/2023 12:16 PM by Dr. Wilton Chakraborty M.D on Workstation: [id number]       Ordered the above noted radiological studies. Reviewed by me in PACS.            PROCEDURES  Procedures              MEDICATIONS GIVEN IN ER  Medications   sodium chloride 0.9 % flush 10 mL (has no administration in time range)                   MEDICAL DECISION MAKING, PROGRESS, and CONSULTS    All labs have been independently reviewed by me.  All radiology studies have been reviewed by me and I have also reviewed the radiology report.   EKG's independently viewed and interpreted by me.  Discussion below represents my analysis of pertinent findings related to patient's condition, differential diagnosis, treatment plan and final disposition.      Additional sources:  - Discussed/ obtained information from independent historians: Patient's daughter at bedside    - External (non-ED) record review: Recent PCP office visit and prior  echo also reviewed, summarized below    - Chronic or social conditions impacting care: N/A    - Shared decision making: Patient and daughter informed of plan to trial leg elevation and compression stockings for her mild swelling.  If symptoms persist despite these interventions, she will need to follow-up with her PCP to consider initiation of diuretic therapy.  I also counseled her on the need to monitor her sodium intake.      Orders placed during this visit:  Orders Placed This Encounter   Procedures    XR Chest 1 View    Comprehensive Metabolic Panel    BNP    High Sensitivity Troponin T    CBC Auto Differential    High Sensitivity Troponin T 2Hr    ECG 12 Lead Other; feet swelling    SCANNED - TELEMETRY      SCANNED - TELEMETRY      Insert Peripheral IV    CBC & Differential           Differential diagnosis includes but is not limited to:    CHF  Venous insufficiency  Hypoalbuminemia  Adverse effects of medication        Independent interpretation of labs, radiology studies, and discussions with consultants:  ED Course as of 12/18/23 1326   Mon Dec 18, 2023   1151 EKG          EKG time: 11:40 AM  Rhythm/Rate: Sinus rhythm, 69  P waves and ND: Normal  QRS, axis: Normal axis  ST and T waves: Borderline ST segment depression inferior laterally, T wave inversions V1 V2    Interpreted Contemporaneously by me, independently viewed  Similar compared to prior 8/25/2020       [JR]   1152 proBNP: 334.0 [JR]   1152 HS Troponin T(!): 35 [JR]   1152 WBC: 5.35 [JR]   1152 Albumin: 4.4 [JR]   1153 Record review: I reviewed PCP office visit from October 12, 2023.  Patient was seen in follow-up for weakness of both lower extremities, hyperlipidemia, history of CVA.  She also had a cerumen impaction that was treated. [JR]   1200 Chest x-ray independently interpreted in PACS.  There is mild elevation of the right hemidiaphragm, perhaps minimal vascular congestion, no infiltrate. [JR]   1202 Prior echo reviewed and showed normal  EF, grade 1 diastolic dysfunction. [JR]   1256 HS Troponin T(!): 33 [JR]   1256 Troponin T Delta: -2 [JR]      ED Course User Index  [JR] Edward Blake MD           DIAGNOSIS  Final diagnoses:   Leg swelling   Hypertension, unspecified type   Diastolic congestive heart failure, unspecified HF chronicity         DISPOSITION  DISCHARGE    Patient discharged in stable condition.    Reviewed implications of results, diagnosis, meds, responsibility to follow up, warning signs and symptoms of possible worsening, potential complications and reasons to return to ER.    Patient/Family voiced understanding of above instructions.    Discussed plan for discharge, as there is no emergent indication for admission. Patient referred to primary care provider for BP management due to today's BP. Pt/family is agreeable and understands need for follow up and repeat testing.  Pt is aware that discharge does not mean that nothing is wrong but it indicates no emergency is present that requires admission and they must continue care with follow-up as given below or physician of their choice.     FOLLOW-UP  Haleigh Nicolas MD  [address] [address]  [phone]    Schedule an appointment as soon as possible for a visit            Medication List      No changes were made to your prescriptions during this visit.                   Latest Documented Vital Signs:  As of 13:26 EST  BP- 162/80 HR- 74 Temp- 98.5 °F (36.9 °C) (Tympanic) O2 sat- 95%              --    Please note that portions of this were completed with a voice recognition program.       Note Disclaimer: At [facility], we believe that sharing information builds trust and better relationships. You are receiving this note because you are receiving care at [facility] or recently visited. It is possible you will see health information before a provider has talked with you about it. This kind of information can be easy to misunderstand. To help you  fully understand what it means for your health, we urge you to discuss this note with your provider.             Edward Blake MD  12/18/23 [id number]

## 2024-02-04 DIAGNOSIS — K21.9 GASTROESOPHAGEAL REFLUX DISEASE WITHOUT ESOPHAGITIS: ICD-10-CM

## 2024-02-05 RX ORDER — FAMOTIDINE 40 MG/1
40 TABLET, FILM COATED ORAL NIGHTLY PRN
Qty: 90 TABLET | Refills: 3 | Status: SHIPPED | OUTPATIENT
Start: 2024-02-05

## 2024-04-02 DIAGNOSIS — I10 ESSENTIAL HYPERTENSION: Primary | ICD-10-CM

## 2024-04-02 DIAGNOSIS — Z86.73 HISTORY OF CVA (CEREBROVASCULAR ACCIDENT): ICD-10-CM

## 2024-04-02 DIAGNOSIS — E78.41 ELEVATED LIPOPROTEIN(A): ICD-10-CM

## 2024-04-11 LAB
ALBUMIN SERPL-MCNC: 4.9 G/DL (ref 3.5–5.2)
ALBUMIN/GLOB SERPL: 2 G/DL
ALP SERPL-CCNC: 56 U/L (ref 39–117)
ALT SERPL-CCNC: 17 U/L (ref 1–33)
AST SERPL-CCNC: 21 U/L (ref 1–32)
BASOPHILS # BLD AUTO: 0.05 10*3/MM3 (ref 0–0.2)
BASOPHILS NFR BLD AUTO: 0.8 % (ref 0–1.5)
BILIRUB SERPL-MCNC: 0.8 MG/DL (ref 0–1.2)
BUN SERPL-MCNC: 23 MG/DL (ref 8–23)
BUN/CREAT SERPL: 22.8 (ref 7–25)
CALCIUM SERPL-MCNC: 10.1 MG/DL (ref 8.6–10.5)
CHLORIDE SERPL-SCNC: 104 MMOL/L (ref 98–107)
CHOLEST SERPL-MCNC: 136 MG/DL (ref 0–200)
CO2 SERPL-SCNC: 27 MMOL/L (ref 22–29)
CREAT SERPL-MCNC: 1.01 MG/DL (ref 0.57–1)
EGFRCR SERPLBLD CKD-EPI 2021: 53.3 ML/MIN/1.73
EOSINOPHIL # BLD AUTO: 0.15 10*3/MM3 (ref 0–0.4)
EOSINOPHIL NFR BLD AUTO: 2.4 % (ref 0.3–6.2)
ERYTHROCYTE [DISTWIDTH] IN BLOOD BY AUTOMATED COUNT: 13.2 % (ref 12.3–15.4)
GLOBULIN SER CALC-MCNC: 2.4 GM/DL
GLUCOSE SERPL-MCNC: 92 MG/DL (ref 65–99)
HCT VFR BLD AUTO: 38.8 % (ref 34–46.6)
HDLC SERPL-MCNC: 68 MG/DL (ref 40–60)
HGB BLD-MCNC: 12.5 G/DL (ref 12–15.9)
IMM GRANULOCYTES # BLD AUTO: 0.01 10*3/MM3 (ref 0–0.05)
IMM GRANULOCYTES NFR BLD AUTO: 0.2 % (ref 0–0.5)
LDLC SERPL CALC-MCNC: 55 MG/DL (ref 0–100)
LDLC/HDLC SERPL: 0.81 {RATIO}
LYMPHOCYTES # BLD AUTO: 1.59 10*3/MM3 (ref 0.7–3.1)
LYMPHOCYTES NFR BLD AUTO: 25.5 % (ref 19.6–45.3)
MCH RBC QN AUTO: 30.5 PG (ref 26.6–33)
MCHC RBC AUTO-ENTMCNC: 32.2 G/DL (ref 31.5–35.7)
MCV RBC AUTO: 94.6 FL (ref 79–97)
MONOCYTES # BLD AUTO: 0.61 10*3/MM3 (ref 0.1–0.9)
MONOCYTES NFR BLD AUTO: 9.8 % (ref 5–12)
NEUTROPHILS # BLD AUTO: 3.83 10*3/MM3 (ref 1.7–7)
NEUTROPHILS NFR BLD AUTO: 61.3 % (ref 42.7–76)
NRBC BLD AUTO-RTO: 0 /100 WBC (ref 0–0.2)
PLATELET # BLD AUTO: 182 10*3/MM3 (ref 140–450)
POTASSIUM SERPL-SCNC: 4.1 MMOL/L (ref 3.5–5.2)
PROT SERPL-MCNC: 7.3 G/DL (ref 6–8.5)
RBC # BLD AUTO: 4.1 10*6/MM3 (ref 3.77–5.28)
SODIUM SERPL-SCNC: 142 MMOL/L (ref 136–145)
TRIGL SERPL-MCNC: 64 MG/DL (ref 0–150)
VLDLC SERPL CALC-MCNC: 13 MG/DL (ref 5–40)
WBC # BLD AUTO: 6.24 10*3/MM3 (ref 3.4–10.8)

## 2024-04-23 ENCOUNTER — OFFICE VISIT (OUTPATIENT)
Dept: FAMILY MEDICINE CLINIC | Facility: CLINIC | Age: 89
End: 2024-04-23
Payer: MEDICARE

## 2024-04-23 VITALS
HEIGHT: 64 IN | WEIGHT: 109 LBS | SYSTOLIC BLOOD PRESSURE: 146 MMHG | HEART RATE: 78 BPM | DIASTOLIC BLOOD PRESSURE: 78 MMHG | BODY MASS INDEX: 18.61 KG/M2 | OXYGEN SATURATION: 95 % | TEMPERATURE: 98 F

## 2024-04-23 DIAGNOSIS — N39.41 URGE INCONTINENCE OF URINE: ICD-10-CM

## 2024-04-23 DIAGNOSIS — I10 ESSENTIAL HYPERTENSION: ICD-10-CM

## 2024-04-23 DIAGNOSIS — R39.15 URINARY URGENCY: ICD-10-CM

## 2024-04-23 DIAGNOSIS — Z00.00 MEDICARE ANNUAL WELLNESS VISIT, SUBSEQUENT: Primary | ICD-10-CM

## 2024-04-23 DIAGNOSIS — H60.393 OTHER INFECTIVE ACUTE OTITIS EXTERNA OF BOTH EARS: ICD-10-CM

## 2024-04-23 LAB
BILIRUB BLD-MCNC: NEGATIVE MG/DL
CLARITY, POC: CLEAR
COLOR UR: YELLOW
EXPIRATION DATE: ABNORMAL
GLUCOSE UR STRIP-MCNC: NEGATIVE MG/DL
KETONES UR QL: NEGATIVE
LEUKOCYTE EST, POC: NEGATIVE
Lab: 3953
NITRITE UR-MCNC: NEGATIVE MG/ML
PH UR: 6 [PH] (ref 5–8)
PROT UR STRIP-MCNC: NEGATIVE MG/DL
RBC # UR STRIP: ABNORMAL /UL
SP GR UR: 1.02 (ref 1–1.03)
UROBILINOGEN UR QL: NORMAL

## 2024-04-23 PROCEDURE — 1170F FXNL STATUS ASSESSED: CPT | Performed by: FAMILY MEDICINE

## 2024-04-23 PROCEDURE — 1160F RVW MEDS BY RX/DR IN RCRD: CPT | Performed by: FAMILY MEDICINE

## 2024-04-23 PROCEDURE — 1159F MED LIST DOCD IN RCRD: CPT | Performed by: FAMILY MEDICINE

## 2024-04-23 PROCEDURE — 99214 OFFICE O/P EST MOD 30 MIN: CPT | Performed by: FAMILY MEDICINE

## 2024-04-23 PROCEDURE — G0439 PPPS, SUBSEQ VISIT: HCPCS | Performed by: FAMILY MEDICINE

## 2024-04-23 PROCEDURE — 81003 URINALYSIS AUTO W/O SCOPE: CPT | Performed by: FAMILY MEDICINE

## 2024-04-23 RX ORDER — TRIAMCINOLONE ACETONIDE 1 MG/G
1 OINTMENT TOPICAL 2 TIMES DAILY
Qty: 30 G | Refills: 1 | Status: SHIPPED | OUTPATIENT
Start: 2024-04-23

## 2024-04-23 NOTE — PROGRESS NOTES
The ABCs of the Annual Wellness Visit  Subsequent Medicare Wellness Visit    Subjective    Allison Fitzpatrick is a 89 y.o. female who presents for a Subsequent Medicare Wellness Visit.    The following portions of the patient's history were reviewed and   updated as appropriate: allergies, current medications, past family history, past medical history, past social history, past surgical history, and problem list.    Compared to one year ago, the patient feels her physical   health is worse.    Compared to one year ago, the patient feels her mental   health is worse.    Recent Hospitalizations:  She was not admitted to the hospital during the last year.       Current Medical Providers:  Patient Care Team:  Haleigh Nicolas MD as PCP - General (Family Medicine)  Angelia Cormier MD as Consulting Physician (Otolaryngology)  Sharita Garrett MD as Consulting Physician (Cardiology)    Outpatient Medications Prior to Visit   Medication Sig Dispense Refill    acetaminophen (TYLENOL) 650 MG 8 hr tablet Take 1 tablet by mouth Every 8 (Eight) Hours As Needed for Mild Pain.      amLODIPine (NORVASC) 5 MG tablet TAKE 1 TABLET BY MOUTH DAILY 90 tablet 3    aspirin 81 MG chewable tablet Chew 1 tablet Daily.      atorvastatin (LIPITOR) 40 MG tablet TAKE 1 TABLET BY MOUTH DAILY 90 tablet 3    famotidine (PEPCID) 40 MG tablet TAKE 1 TABLET BY MOUTH AT NIGHT  AS NEEDED FOR HEARTBURN 90 tablet 3    loperamide (IMODIUM) 2 MG capsule Take 1 capsule by mouth 4 (Four) Times a Day As Needed for Diarrhea.      mupirocin (BACTROBAN) 2 % ointment Apply 1 application topically to the appropriate area as directed 3 (Three) Times a Day. 30 g 2     No facility-administered medications prior to visit.       No opioid medication identified on active medication list. I have reviewed chart for other potential  high risk medication/s and harmful drug interactions in the elderly.        Aspirin is on active medication list. Aspirin use is indicated  "based on review of current medical condition/s. Pros and cons of this therapy have been discussed today. Benefits of this medication outweigh potential harm.  Patient has been encouraged to continue taking this medication.  .      Patient Active Problem List   Diagnosis    Diverticulitis of large intestine    Near syncope    Essential hypertension    Bilateral hearing loss    Gait instability    Cholesteatoma of attic of left ear    HLD (hyperlipidemia)    Chest pain    Community acquired pneumonia of left lung    HARRY (acute kidney injury)    Acute ischemic stroke    Cerebrovascular disease    Medicare annual wellness visit, subsequent    Fatigue    Chest wall tenderness    Difficulty in walking, not elsewhere classified    Dementia     Advance Care Planning   Advance Care Planning     Advance Directive is on file.  ACP discussion was held with the patient during this visit. Patient has an advance directive in EMR which is still valid.      Objective    Vitals:    04/23/24 0919   BP: 146/78   Pulse: 78   Temp: 98 °F (36.7 °C)   SpO2: 95%   Weight: 49.4 kg (109 lb)   Height: 162.6 cm (64\")     Estimated body mass index is 18.71 kg/m² as calculated from the following:    Height as of this encounter: 162.6 cm (64\").    Weight as of this encounter: 49.4 kg (109 lb).    BMI is within normal parameters. No other follow-up for BMI required.      Does the patient have evidence of cognitive impairment? Yes, slight worsening, not wanting further intervention at this time    Lab Results   Component Value Date    CHLPL 136 04/10/2024    TRIG 64 04/10/2024    HDL 68 (H) 04/10/2024    LDL 55 04/10/2024    VLDL 13 04/10/2024        HEALTH RISK ASSESSMENT    Smoking Status:  Social History     Tobacco Use   Smoking Status Never   Smokeless Tobacco Not on file     Alcohol Consumption:  Social History     Substance and Sexual Activity   Alcohol Use No     Fall Risk Screen:    STEADI Fall Risk Assessment was completed, and patient is " at LOW risk for falls.Assessment completed on:2024    Depression Screenin/23/2024     9:23 AM   PHQ-2/PHQ-9 Depression Screening   Little Interest or Pleasure in Doing Things 0-->not at all   Feeling Down, Depressed or Hopeless 0-->not at all   PHQ-9: Brief Depression Severity Measure Score 0       Health Habits and Functional and Cognitive Screenin/23/2024     9:24 AM   Functional & Cognitive Status   Do you have difficulty preparing food and eating? No   Do you have difficulty bathing yourself, getting dressed or grooming yourself? No   Do you have difficulty using the toilet? No   Do you have difficulty moving around from place to place? No   Do you have trouble with steps or getting out of a bed or a chair? Yes   Current Diet Well Balanced Diet   Dental Exam Not up to date   Eye Exam Up to date   Exercise (times per week) 2 times per week   Current Exercises Include Walking   Do you need help using the phone?  Yes   Are you deaf or do you have serious difficulty hearing?  Yes   Do you need help to go to places out of walking distance? Yes   Do you need help shopping? Yes   Do you need help preparing meals?  No   Do you need help with housework?  No   Do you need help with laundry? No   Do you need help taking your medications? No   Do you need help managing money? No   Do you ever drive or ride in a car without wearing a seat belt? No   Have you felt unusual stress, anger or loneliness in the last month? No   Who do you live with? Child   If you need help, do you have trouble finding someone available to you? No   Have you been bothered in the last four weeks by sexual problems? No   Do you have difficulty concentrating, remembering or making decisions? Yes       Age-appropriate Screening Schedule:  Refer to the list below for future screening recommendations based on patient's age, sex and/or medical conditions. Orders for these recommended tests are listed in the plan section. The  patient has been provided with a written plan.    Health Maintenance   Topic Date Due    RSV Vaccine - Adults (1 - 1-dose 60+ series) Never done    COVID-19 Vaccine (5 - 2023-24 season) 09/01/2023    INFLUENZA VACCINE  08/01/2024    LIPID PANEL  04/10/2025    ANNUAL WELLNESS VISIT  04/23/2025    TDAP/TD VACCINES (2 - Tdap) 02/08/2028    Pneumococcal Vaccine 65+  Completed    ZOSTER VACCINE  Completed    DXA SCAN  Discontinued                  CMS Preventative Services Quick Reference  Risk Factors Identified During Encounter  Hearing Problem:  Patient not wanting hearing correction, profound hearing loss  Inactivity/Sedentary:  Discussed maintaining activity is much as she is able  Urinary Incontinence: New medication begun   Dental Screening Recommended  Vision Screening Recommended  The above risks/problems have been discussed with the patient.  Pertinent information has been shared with the patient in the After Visit Summary.  An After Visit Summary and PPPS were made available to the patient.    Follow Up:   Next Medicare Wellness visit to be scheduled in 1 year.       Additional E&M Note during same encounter follows:  Patient has multiple medical problems which are significant and separately identifiable that require additional work above and beyond the Medicare Wellness Visit.      Chief Complaint  Medicare Wellness-subsequent (Would like to check  ears today ,urinary incontinence and memory getting worse main problems today )    Subjective        HPI  Allison Fitzpatrick is also being seen today for     pressure in her ears that has been present for some months, she does have difficulty hearing which seems to be worsening slightly but is overall stable.    Urge incontinence, some increased frequency with urination.  Particularly with control, sometimes she will have complete loss of control of her bladder.  Sometimes she will stand and have complete incontinence of her bladder, this affects her at night and  "during the day.    Her memory is also worsening, hearing is worsening.  She does have loose bowels and known dementia, not wanting to consider new medications or referrals at this time.  Able to get about on her own relatively well         Objective   Vital Signs:  /78   Pulse 78   Temp 98 °F (36.7 °C)   Ht 162.6 cm (64\")   Wt 49.4 kg (109 lb)   SpO2 95%   BMI 18.71 kg/m²     Physical Exam  Vitals and nursing note reviewed.   Constitutional:       General: She is not in acute distress.     Appearance: She is well-developed.   HENT:      Head: Normocephalic.      Right Ear: Tympanic membrane normal. There is no impacted cerumen.      Left Ear: Tympanic membrane normal. There is no impacted cerumen.      Ears:      Comments: Erythema bright red with some appearance of fluffy white material within both ears.     Nose: Nose normal.   Cardiovascular:      Rate and Rhythm: Normal rate and regular rhythm.      Heart sounds: Normal heart sounds. No murmur heard.  Pulmonary:      Effort: Pulmonary effort is normal. No respiratory distress.      Breath sounds: Normal breath sounds.   Musculoskeletal:         General: Normal range of motion.   Skin:     General: Skin is warm and dry.      Findings: No rash.   Neurological:      Mental Status: She is alert and oriented to person, place, and time.   Psychiatric:         Behavior: Behavior normal.         Thought Content: Thought content normal.         Judgment: Judgment normal.                         Assessment and Plan   Diagnoses and all orders for this visit:    1. Medicare annual wellness visit, subsequent (Primary)    2. Other infective acute otitis externa of both ears  -     neomycin-polymyxin-hydrocortisone (CORTISPORIN) 3.5-02399-6 otic solution; Administer 3 drops into both ears 3 (Three) Times a Day.  Dispense: 10 mL; Refill: 0  -     triamcinolone (KENALOG) 0.1 % ointment; Apply 1 Application topically to the appropriate area as directed 2 (Two) Times a " Day. ears  Dispense: 30 g; Refill: 1    3. Urinary urgency  -     Cancel: Microalbumin / Creatinine Urine Ratio - Urine, Clean Catch  -     Urine Culture - Urine, Urine, Catheter  -     POCT urinalysis dipstick, automated    4. Urge incontinence of urine  -     Mirabegron ER (Myrbetriq) 25 MG tablet sustained-release 24 hour 24 hr tablet; Take 1 tablet by mouth Daily. Urinary incontinence  Dispense: 90 tablet; Refill: 1    5. Essential hypertension  -     Comprehensive Metabolic Panel; Future  -     CBC & Differential; Future  -     Lipid Panel; Future  -     TSH Rfx On Abnormal To Free T4; Future    Pleasant 89-year-old female here for Medicare wellness visit overall doing well.  Right port given mostly by her daughter Tish Sánchez.  There does seem to be advancement in terms of worsening dementia, memory and hearing.  She is also having worsening incontinence but she has not fallen, balance is good, diet is good.  Adherent with current meds.  She overall is doing well.    Initial urinalysis was negative for UTI will send for culture and treat for urge incontinence with Myrbetriq.  Discussed she may complain of some symptoms of dry mouth.  If it seems to be more bothersome than the urination we can discontinue this med.  (Microalbumin ordered in error and cancelled)    Also with some otitis externa, hyperemia of both ears and she is complaining of pain in both ears.  Will treat with drops as above and then continue with Kenalog for actinic keratosis.         Follow Up   Return in about 6 months (around 10/23/2024), or if symptoms worsen or fail to improve, for Recheck hypertension with labs prior.  Patient was given instructions and counseling regarding her condition or for health maintenance advice. Please see specific information pulled into the AVS if appropriate.

## 2024-05-08 ENCOUNTER — TELEPHONE (OUTPATIENT)
Dept: FAMILY MEDICINE CLINIC | Facility: CLINIC | Age: 89
End: 2024-05-08
Payer: MEDICARE

## 2024-06-11 ENCOUNTER — TELEPHONE (OUTPATIENT)
Dept: FAMILY MEDICINE CLINIC | Facility: CLINIC | Age: 89
End: 2024-06-11
Payer: MEDICARE

## 2024-06-11 NOTE — TELEPHONE ENCOUNTER
Patients daughter Tish dropped off a form to approve cataract surgery. I advised Tish to make patient an appointment, Tish declined. Surgical clearance form is in Dr. Nicolas's folder for review.

## 2024-06-11 NOTE — TELEPHONE ENCOUNTER
Not a problem, cleared for cataract surgery, okay to hold aspirin for 7-10 days prior to procedure-form completed.

## 2024-06-11 NOTE — TELEPHONE ENCOUNTER
I left the form in Dr Fidencio ochoa  ,for cataract surgery  with monitored anesthesia    SUBJECTIVE:    CHIEF COMPLAINT:   Chief Complaint   Patient presents with    Follow-up           274}    HISTORY OF PRESENT ILLNESS:  Elvia Cheney is a 41 y.o. female with Type 2 Diabetes Mellitus, HLD, and MO who presents to the clinic today for followup. She reports that she is doing well on Mounjaro. She has noticed that she does not feel nauseaous and that her cravings have decreased since starting the medication.     She also reports continued constipation.       PAST MEDICAL HISTORY:     274}  Past Medical History:   Diagnosis Date    Diabetes mellitus, type 2     Hyperlipidemia        PAST SURGICAL HISTORY:  History reviewed. No pertinent surgical history.    SOCIAL HISTORY:  Social History     Socioeconomic History    Marital status:    Tobacco Use    Smoking status: Never    Smokeless tobacco: Never   Substance and Sexual Activity    Alcohol use: Not Currently    Drug use: Never    Sexual activity: Yes     Partners: Male     Birth control/protection: None     Social Determinants of Health     Financial Resource Strain: Low Risk  (3/12/2024)    Overall Financial Resource Strain (CARDIA)     Difficulty of Paying Living Expenses: Not hard at all   Food Insecurity: No Food Insecurity (3/12/2024)    Hunger Vital Sign     Worried About Running Out of Food in the Last Year: Never true     Ran Out of Food in the Last Year: Never true   Transportation Needs: No Transportation Needs (3/12/2024)    PRAPARE - Transportation     Lack of Transportation (Medical): No     Lack of Transportation (Non-Medical): No   Physical Activity: Sufficiently Active (3/12/2024)    Exercise Vital Sign     Days of Exercise per Week: 4 days     Minutes of Exercise per Session: 90 min   Stress: Stress Concern Present (3/12/2024)    [employer] - Occupational Stress Questionnaire     Feeling of Stress : Very much   Social Connections: Unknown (3/12/2024)    Social Connection and Isolation Panel [NHANES]      Frequency of Communication with Friends and Family: More than three times a week     Frequency of Social Gatherings with Friends and Family: More than three times a week     Active Member of Clubs or Organizations: Yes     Attends Club or Organization Meetings: More than 4 times per year     Marital Status:    Housing Stability: Low Risk  (3/12/2024)    Housing Stability Vital Sign     Unable to Pay for Housing in the Last Year: No     Number of Places Lived in the Last Year: 1     Unstable Housing in the Last Year: No       FAMILY HISTORY:       Family History   Problem Relation Age of Onset    Diabetes Maternal Grandmother     Hypertension Maternal Grandmother     Diabetes Paternal Grandfather     Hypertension Paternal Grandfather     Hyperthyroidism Maternal Aunt        ALLERGIES AND MEDICATIONS: updated and reviewed.      274}  Review of patient's allergies indicates:  No Known Allergies  Medication List with Changes/Refills   New Medications    ALCOHOL SWABS PADM    Apply 1 each topically 3 (three) times daily.    BLOOD SUGAR DIAGNOSTIC (CONTOUR NEXT TEST STRIPS) STRP    1 strip by Misc.(Non-Drug; Combo Route) route 3 (three) times daily.    BLOOD-GLUCOSE METER,CONTINUOUS (DEXCOM G6 ) MISC    1 Units by Misc.(Non-Drug; Combo Route) route continuous.    BLOOD-GLUCOSE SENSOR (DEXCOM G6 SENSOR) MIRANDA    3 Devices by Misc.(Non-Drug; Combo Route) route every 10 days.    BLOOD-GLUCOSE TRANSMITTER (DEXCOM G6 TRANSMITTER) MIRANDA    1 Device by Misc.(Non-Drug; Combo Route) route continuous.    METFORMIN (GLUCOPHAGE-XR) 750 MG ER 24HR TABLET    Take 1 tablet (750 mg total) by mouth daily with breakfast.    POLYETHYLENE GLYCOL (GLYCOLAX) 17 GRAM/DOSE POWDER    Take 17 g by mouth 2 (two) times daily as needed for Constipation. For Constipation   Current Medications    ALBUTEROL (PROVENTIL/VENTOLIN HFA) 90 MCG/ACTUATION INHALER    Inhale 1 puff into the lungs every 4 (four) hours as needed for Shortness of  Breath.    ATORVASTATIN (LIPITOR) 20 MG TABLET    Take 1 tablet (20 mg total) by mouth once daily.    BLOOD SUGAR DIAGNOSTIC STRP    1 strip by Misc.(Non-Drug; Combo Route) route 2 (two) times a day. Use with meter to check blood glucose.    BLOOD-GLUCOSE METER MISC    1 each by Other route 2 (two) times a day.    CETIRIZINE (ZYRTEC) 10 MG TABLET    Take 10 mg by mouth.    FLUTICASONE PROPIONATE (FLONASE) 50 MCG/ACTUATION NASAL SPRAY    1 spray (50 mcg total) by Each Nostril route once daily.    LANCETS (TRUEPLUS LANCETS) 33 GAUGE MISC    as directed    NORETHINDRONE-ETHINYL ESTRADIOL (BLISOVI FE 1/20, 28,) 1 MG-20 MCG (21)/75 MG (7) PER TABLET    Take 1 tablet by mouth once daily.   Changed and/or Refilled Medications    Modified Medication Previous Medication    TIRZEPATIDE 7.5 MG/0.5 ML PNIJ tirzepatide 7.5 mg/0.5 mL PnIj       Inject 7.5 mg into the skin every 7 days. For type 2 diabetes    Inject 7.5 mg into the skin every 7 days. For type 2 diabetes   Discontinued Medications    METFORMIN (GLUCOPHAGE-XR) 500 MG ER 24HR TABLET    Take 1 tablet (500 mg total) by mouth 2 (two) times daily with meals.       SCREENING HISTORY:    274}  Health Maintenance         Date Due Completion Date    Diabetes Urine Screening Never done ---    Pneumococcal Vaccines (Age 0-64) (1 of 2 - PCV) Never done ---    Foot Exam Never done ---    Influenza Vaccine (1) Never done ---    COVID-19 Vaccine (3 - 2023-24 season) 09/01/2023 11/8/2021    Mammogram 06/08/2024 6/8/2023    Hemoglobin A1c 09/12/2024 3/12/2024    Eye Exam 02/21/2025 2/21/2024    Low Dose Statin 03/12/2025 3/12/2024    Lipid Panel 03/12/2025 3/12/2024    TETANUS VACCINE 03/30/2027 3/30/2017    Cervical Cancer Screening 06/09/2028 6/9/2023            REVIEW OF SYSTEMS:   Review of Systems   All other systems reviewed and are negative.      PHYSICAL EXAM:      274}  /80 (BP Location: Right arm, Patient Position: Sitting, BP Method: Large (Manual))   Pulse 90    "Temp 98.5 °F (36.9 °C)   Ht 5' 9" (1.753 m)   Wt 116.2 kg (256 lb 2.8 oz)   SpO2 98%   BMI 37.83 kg/m²   Wt Readings from Last 3 Encounters:   03/12/24 116.2 kg (256 lb 2.8 oz)   12/20/23 118.5 kg (261 lb 3.9 oz)   12/08/23 117.7 kg (259 lb 7.7 oz)     BP Readings from Last 3 Encounters:   03/12/24 120/80   12/20/23 (!) 150/83   12/08/23 138/82     Estimated body mass index is 37.83 kg/m² as calculated from the following:    Height as of this encounter: 5' 9" (1.753 m).    Weight as of this encounter: 116.2 kg (256 lb 2.8 oz).     Physical Exam  Vitals reviewed.   Constitutional:       General: She is not in acute distress.     Appearance: Normal appearance. She is well-developed. She is obese. She is not ill-appearing.   HENT:      Head: Normocephalic and atraumatic.      Right Ear: External ear normal.      Left Ear: External ear normal.      Nose: Nose normal.   Eyes:      Extraocular Movements: Extraocular movements intact.      Conjunctiva/sclera: Conjunctivae normal.      Pupils: Pupils are equal, round, and reactive to light.   Neck:      Thyroid: No thyroid mass.   Cardiovascular:      Rate and Rhythm: Normal rate and regular rhythm.      Pulses: Normal pulses.      Heart sounds: Normal heart sounds, S1 normal and S2 normal. No murmur heard.     No gallop.   Pulmonary:      Effort: Pulmonary effort is normal. No respiratory distress.      Breath sounds: Normal breath sounds and air entry. No stridor. No wheezing, rhonchi or rales.   Abdominal:      General: Bowel sounds are normal. There is no distension.      Palpations: Abdomen is soft. There is no mass.      Tenderness: There is no abdominal tenderness.   Musculoskeletal:         General: No swelling or deformity. Normal range of motion.      Cervical back: Normal range of motion and neck supple. No edema or erythema.   Skin:     General: Skin is warm and dry.      Findings: No lesion or rash.   Neurological:      General: No focal deficit present.    "   Mental Status: She is alert and oriented to person, place, and time. Mental status is at baseline.   Psychiatric:         Mood and Affect: Mood normal.         Behavior: Behavior normal. Behavior is cooperative.         Judgment: Judgment normal.         LABS:   274}  I have reviewed old labs below:  Lab Results   Component Value Date    WBC 8.98 03/12/2024    HGB 14.6 03/12/2024    HCT 44.9 03/12/2024    MCV 88 03/12/2024     03/12/2024     03/12/2024    K 4.4 03/12/2024     03/12/2024    CALCIUM 9.7 03/12/2024    CO2 24 03/12/2024     (H) 03/12/2024    BUN 8 03/12/2024    CREATININE 0.7 03/12/2024    ANIONGAP 8 03/12/2024    PROT 8.0 03/12/2024    ALBUMIN 3.8 03/12/2024    BILITOT 0.4 03/12/2024    ALKPHOS 53 (L) 03/12/2024    ALT 19 03/12/2024    AST 17 03/12/2024    CHOL 164 03/12/2024    TRIG 126 03/12/2024    HDL 40 03/12/2024    LDLCALC 98.8 03/12/2024    TSH 1.589 12/08/2023    HGBA1C 6.0 (H) 03/12/2024       ASSESSMENT AND PLAN:  274}  1. Follow-up exam  -     CBC Without Differential; Future; Expected date: 09/12/2024    2. Hyperlipidemia associated with type 2 diabetes mellitus  -     Lipid Panel; Future; Expected date: 09/12/2024    3. Severe obesity (BMI 35.0-39.9) with comorbidity  -     tirzepatide 7.5 mg/0.5 mL PnIj; Inject 7.5 mg into the skin every 7 days. For type 2 diabetes  Dispense: 8 Pen; Refill: 1  -     Hemoglobin A1C; Future; Expected date: 09/12/2024  -     Comprehensive Metabolic Panel; Future; Expected date: 03/12/2024  -     Microalbumin/Creatinine Ratio, Urine; Future; Expected date: 09/12/2024  -     Microalbumin/Creatinine Ratio, Urine; Future; Expected date: 03/12/2024    4. Type 2 diabetes mellitus without complication, without long-term current use of insulin  -     Microalbumin/Creatinine Ratio, Urine; Future; Expected date: 03/12/2024  -     blood sugar diagnostic (CONTOUR NEXT TEST STRIPS) Strp; 1 strip by Misc.(Non-Drug; Combo Route) route 3 (three)  times daily.  Dispense: 300 strip; Refill: 6  -     alcohol swabs PadM; Apply 1 each topically 3 (three) times daily.  Dispense: 300 each; Refill: 3  -     blood-glucose sensor (DEXCOM G6 SENSOR) Deepa; 3 Devices by Misc.(Non-Drug; Combo Route) route every 10 days.  Dispense: 3 each; Refill: 11  -     blood-glucose transmitter (DEXCOM G6 TRANSMITTER) Deepa; 1 Device by Misc.(Non-Drug; Combo Route) route continuous.  Dispense: 1 each; Refill: 3  -     blood-glucose meter,continuous (DEXCOM G6 ) Misc; 1 Units by Misc.(Non-Drug; Combo Route) route continuous.  Dispense: 1 each; Refill: 0  -     Ambulatory referral/consult to Podiatry; Future; Expected date: 03/19/2024  -     tirzepatide 7.5 mg/0.5 mL PnIj; Inject 7.5 mg into the skin every 7 days. For type 2 diabetes  Dispense: 8 Pen; Refill: 1  -     Hemoglobin A1C; Future; Expected date: 09/12/2024  -     Comprehensive Metabolic Panel; Future; Expected date: 03/12/2024  -     Microalbumin/Creatinine Ratio, Urine; Future; Expected date: 09/12/2024  -     Microalbumin/Creatinine Ratio, Urine; Future; Expected date: 03/12/2024  -     metFORMIN (GLUCOPHAGE-XR) 750 MG ER 24hr tablet; Take 1 tablet (750 mg total) by mouth daily with breakfast.  Dispense: 90 tablet; Refill: 1    5. Constipation, unspecified constipation type  -     polyethylene glycol (GLYCOLAX) 17 gram/dose powder; Take 17 g by mouth 2 (two) times daily as needed for Constipation. For Constipation  Dispense: 510 g; Refill: 0           Orders Placed This Encounter   Procedures    Microalbumin/Creatinine Ratio, Urine    Lipid Panel    Hemoglobin A1C    Comprehensive Metabolic Panel    CBC Without Differential    Microalbumin/Creatinine Ratio, Urine    Microalbumin/Creatinine Ratio, Urine    Ambulatory referral/consult to Podiatry       Follow up in about 6 months (around 9/12/2024). or sooner as needed.      I spent a total of 38 minutes on the day of the visit.  This includes face to face time and  non-face to face time preparing to see the patient (eg, review of tests), obtaining and/or reviewing separately obtained history, documenting clinical information in the electronic or other health record, independently interpreting results and communicating results to the patient/family/caregiver, or care coordinator.

## 2024-07-15 ENCOUNTER — APPOINTMENT (OUTPATIENT)
Dept: CT IMAGING | Facility: HOSPITAL | Age: 89
DRG: 312 | End: 2024-07-15
Payer: MEDICARE

## 2024-07-15 ENCOUNTER — APPOINTMENT (OUTPATIENT)
Dept: GENERAL RADIOLOGY | Facility: HOSPITAL | Age: 89
DRG: 312 | End: 2024-07-15
Payer: MEDICARE

## 2024-07-15 ENCOUNTER — HOSPITAL ENCOUNTER (INPATIENT)
Facility: HOSPITAL | Age: 89
LOS: 4 days | Discharge: NURSING, REHAB OR LONG-TERM CARE | DRG: 312 | End: 2024-07-20
Attending: EMERGENCY MEDICINE | Admitting: INTERNAL MEDICINE
Payer: MEDICARE

## 2024-07-15 DIAGNOSIS — E04.1 THYROID NODULE: ICD-10-CM

## 2024-07-15 DIAGNOSIS — R55 SYNCOPE, UNSPECIFIED SYNCOPE TYPE: Primary | ICD-10-CM

## 2024-07-15 DIAGNOSIS — D64.9 CHRONIC ANEMIA: ICD-10-CM

## 2024-07-15 DIAGNOSIS — N18.9 CHRONIC RENAL IMPAIRMENT, UNSPECIFIED CKD STAGE: ICD-10-CM

## 2024-07-15 LAB
ALBUMIN SERPL-MCNC: 4.2 G/DL (ref 3.5–5.2)
ALBUMIN/GLOB SERPL: 1.8 G/DL
ALP SERPL-CCNC: 46 U/L (ref 39–117)
ALT SERPL W P-5'-P-CCNC: 14 U/L (ref 1–33)
ANION GAP SERPL CALCULATED.3IONS-SCNC: 12.7 MMOL/L (ref 5–15)
AST SERPL-CCNC: 20 U/L (ref 1–32)
BACTERIA UR QL AUTO: ABNORMAL /HPF
BASOPHILS # BLD AUTO: 0.03 10*3/MM3 (ref 0–0.2)
BASOPHILS NFR BLD AUTO: 0.4 % (ref 0–1.5)
BILIRUB SERPL-MCNC: 0.6 MG/DL (ref 0–1.2)
BILIRUB UR QL STRIP: NEGATIVE
BUN SERPL-MCNC: 34 MG/DL (ref 8–23)
BUN/CREAT SERPL: 22.7 (ref 7–25)
CALCIUM SPEC-SCNC: 9.1 MG/DL (ref 8.6–10.5)
CHLORIDE SERPL-SCNC: 107 MMOL/L (ref 98–107)
CLARITY UR: CLEAR
CO2 SERPL-SCNC: 23.3 MMOL/L (ref 22–29)
COLOR UR: YELLOW
CREAT SERPL-MCNC: 1.5 MG/DL (ref 0.57–1)
DEPRECATED RDW RBC AUTO: 43.4 FL (ref 37–54)
EGFRCR SERPLBLD CKD-EPI 2021: 33.2 ML/MIN/1.73
EOSINOPHIL # BLD AUTO: 0.1 10*3/MM3 (ref 0–0.4)
EOSINOPHIL NFR BLD AUTO: 1.4 % (ref 0.3–6.2)
ERYTHROCYTE [DISTWIDTH] IN BLOOD BY AUTOMATED COUNT: 12.8 % (ref 12.3–15.4)
GEN 5 2HR TROPONIN T REFLEX: 29 NG/L
GLOBULIN UR ELPH-MCNC: 2.4 GM/DL
GLUCOSE SERPL-MCNC: 163 MG/DL (ref 65–99)
GLUCOSE UR STRIP-MCNC: ABNORMAL MG/DL
HCT VFR BLD AUTO: 36.5 % (ref 34–46.6)
HGB BLD-MCNC: 11.8 G/DL (ref 12–15.9)
HGB UR QL STRIP.AUTO: NEGATIVE
HYALINE CASTS UR QL AUTO: ABNORMAL /LPF
IMM GRANULOCYTES # BLD AUTO: 0.01 10*3/MM3 (ref 0–0.05)
IMM GRANULOCYTES NFR BLD AUTO: 0.1 % (ref 0–0.5)
KETONES UR QL STRIP: NEGATIVE
LEUKOCYTE ESTERASE UR QL STRIP.AUTO: ABNORMAL
LYMPHOCYTES # BLD AUTO: 2.29 10*3/MM3 (ref 0.7–3.1)
LYMPHOCYTES NFR BLD AUTO: 32.6 % (ref 19.6–45.3)
MCH RBC QN AUTO: 30.6 PG (ref 26.6–33)
MCHC RBC AUTO-ENTMCNC: 32.3 G/DL (ref 31.5–35.7)
MCV RBC AUTO: 94.8 FL (ref 79–97)
MONOCYTES # BLD AUTO: 0.69 10*3/MM3 (ref 0.1–0.9)
MONOCYTES NFR BLD AUTO: 9.8 % (ref 5–12)
NEUTROPHILS NFR BLD AUTO: 3.91 10*3/MM3 (ref 1.7–7)
NEUTROPHILS NFR BLD AUTO: 55.7 % (ref 42.7–76)
NITRITE UR QL STRIP: NEGATIVE
NRBC BLD AUTO-RTO: 0 /100 WBC (ref 0–0.2)
NT-PROBNP SERPL-MCNC: 175 PG/ML (ref 0–1800)
PH UR STRIP.AUTO: 5.5 [PH] (ref 5–8)
PLATELET # BLD AUTO: 180 10*3/MM3 (ref 140–450)
PMV BLD AUTO: 10.4 FL (ref 6–12)
POTASSIUM SERPL-SCNC: 4.5 MMOL/L (ref 3.5–5.2)
PROT SERPL-MCNC: 6.6 G/DL (ref 6–8.5)
PROT UR QL STRIP: ABNORMAL
RBC # BLD AUTO: 3.85 10*6/MM3 (ref 3.77–5.28)
RBC # UR STRIP: ABNORMAL /HPF
REF LAB TEST METHOD: ABNORMAL
SODIUM SERPL-SCNC: 143 MMOL/L (ref 136–145)
SP GR UR STRIP: 1.02 (ref 1–1.03)
SQUAMOUS #/AREA URNS HPF: ABNORMAL /HPF
TROPONIN T DELTA: -10 NG/L
TROPONIN T SERPL HS-MCNC: 39 NG/L
UROBILINOGEN UR QL STRIP: ABNORMAL
WBC # UR STRIP: ABNORMAL /HPF
WBC NRBC COR # BLD AUTO: 7.03 10*3/MM3 (ref 3.4–10.8)

## 2024-07-15 PROCEDURE — 99285 EMERGENCY DEPT VISIT HI MDM: CPT

## 2024-07-15 PROCEDURE — 80053 COMPREHEN METABOLIC PANEL: CPT | Performed by: EMERGENCY MEDICINE

## 2024-07-15 PROCEDURE — 87086 URINE CULTURE/COLONY COUNT: CPT | Performed by: STUDENT IN AN ORGANIZED HEALTH CARE EDUCATION/TRAINING PROGRAM

## 2024-07-15 PROCEDURE — G0378 HOSPITAL OBSERVATION PER HR: HCPCS

## 2024-07-15 PROCEDURE — 70450 CT HEAD/BRAIN W/O DYE: CPT

## 2024-07-15 PROCEDURE — 85025 COMPLETE CBC W/AUTO DIFF WBC: CPT | Performed by: EMERGENCY MEDICINE

## 2024-07-15 PROCEDURE — 71045 X-RAY EXAM CHEST 1 VIEW: CPT

## 2024-07-15 PROCEDURE — 84484 ASSAY OF TROPONIN QUANT: CPT | Performed by: EMERGENCY MEDICINE

## 2024-07-15 PROCEDURE — 93010 ELECTROCARDIOGRAM REPORT: CPT | Performed by: INTERNAL MEDICINE

## 2024-07-15 PROCEDURE — 72125 CT NECK SPINE W/O DYE: CPT

## 2024-07-15 PROCEDURE — 93005 ELECTROCARDIOGRAM TRACING: CPT | Performed by: EMERGENCY MEDICINE

## 2024-07-15 PROCEDURE — 36415 COLL VENOUS BLD VENIPUNCTURE: CPT

## 2024-07-15 PROCEDURE — 73590 X-RAY EXAM OF LOWER LEG: CPT

## 2024-07-15 PROCEDURE — 36415 COLL VENOUS BLD VENIPUNCTURE: CPT | Performed by: EMERGENCY MEDICINE

## 2024-07-15 PROCEDURE — 25810000003 SODIUM CHLORIDE 0.9 % SOLUTION: Performed by: INTERNAL MEDICINE

## 2024-07-15 PROCEDURE — 25810000003 SODIUM CHLORIDE 0.9 % SOLUTION: Performed by: EMERGENCY MEDICINE

## 2024-07-15 PROCEDURE — 83880 ASSAY OF NATRIURETIC PEPTIDE: CPT | Performed by: EMERGENCY MEDICINE

## 2024-07-15 PROCEDURE — 81001 URINALYSIS AUTO W/SCOPE: CPT | Performed by: EMERGENCY MEDICINE

## 2024-07-15 RX ORDER — SODIUM CHLORIDE 0.9 % (FLUSH) 0.9 %
10 SYRINGE (ML) INJECTION AS NEEDED
Status: DISCONTINUED | OUTPATIENT
Start: 2024-07-15 | End: 2024-07-20 | Stop reason: HOSPADM

## 2024-07-15 RX ORDER — AMOXICILLIN 250 MG
2 CAPSULE ORAL 2 TIMES DAILY PRN
Status: DISCONTINUED | OUTPATIENT
Start: 2024-07-15 | End: 2024-07-20 | Stop reason: HOSPADM

## 2024-07-15 RX ORDER — ONDANSETRON 2 MG/ML
4 INJECTION INTRAMUSCULAR; INTRAVENOUS EVERY 6 HOURS PRN
Status: DISCONTINUED | OUTPATIENT
Start: 2024-07-15 | End: 2024-07-20 | Stop reason: HOSPADM

## 2024-07-15 RX ORDER — BISACODYL 5 MG/1
5 TABLET, DELAYED RELEASE ORAL DAILY PRN
Status: DISCONTINUED | OUTPATIENT
Start: 2024-07-15 | End: 2024-07-20 | Stop reason: HOSPADM

## 2024-07-15 RX ORDER — BISACODYL 10 MG
10 SUPPOSITORY, RECTAL RECTAL DAILY PRN
Status: DISCONTINUED | OUTPATIENT
Start: 2024-07-15 | End: 2024-07-20 | Stop reason: HOSPADM

## 2024-07-15 RX ORDER — POLYETHYLENE GLYCOL 3350 17 G/17G
17 POWDER, FOR SOLUTION ORAL DAILY PRN
Status: DISCONTINUED | OUTPATIENT
Start: 2024-07-15 | End: 2024-07-20 | Stop reason: HOSPADM

## 2024-07-15 RX ORDER — ACETAMINOPHEN 650 MG/1
650 SUPPOSITORY RECTAL EVERY 4 HOURS PRN
Status: DISCONTINUED | OUTPATIENT
Start: 2024-07-15 | End: 2024-07-20 | Stop reason: HOSPADM

## 2024-07-15 RX ORDER — ACETAMINOPHEN 160 MG/5ML
650 SOLUTION ORAL EVERY 4 HOURS PRN
Status: DISCONTINUED | OUTPATIENT
Start: 2024-07-15 | End: 2024-07-20 | Stop reason: HOSPADM

## 2024-07-15 RX ORDER — SODIUM CHLORIDE 9 MG/ML
100 INJECTION, SOLUTION INTRAVENOUS CONTINUOUS
Status: DISCONTINUED | OUTPATIENT
Start: 2024-07-15 | End: 2024-07-20 | Stop reason: HOSPADM

## 2024-07-15 RX ORDER — ONDANSETRON 4 MG/1
4 TABLET, ORALLY DISINTEGRATING ORAL EVERY 6 HOURS PRN
Status: DISCONTINUED | OUTPATIENT
Start: 2024-07-15 | End: 2024-07-20 | Stop reason: HOSPADM

## 2024-07-15 RX ORDER — SODIUM CHLORIDE 9 MG/ML
75 INJECTION, SOLUTION INTRAVENOUS CONTINUOUS
Status: DISCONTINUED | OUTPATIENT
Start: 2024-07-16 | End: 2024-07-15

## 2024-07-15 RX ORDER — ASPIRIN 81 MG/1
81 TABLET, CHEWABLE ORAL DAILY
Status: DISCONTINUED | OUTPATIENT
Start: 2024-07-16 | End: 2024-07-20 | Stop reason: HOSPADM

## 2024-07-15 RX ORDER — ACETAMINOPHEN 500 MG
1000 TABLET ORAL ONCE
Status: COMPLETED | OUTPATIENT
Start: 2024-07-15 | End: 2024-07-15

## 2024-07-15 RX ORDER — ACETAMINOPHEN 325 MG/1
650 TABLET ORAL EVERY 4 HOURS PRN
Status: DISCONTINUED | OUTPATIENT
Start: 2024-07-15 | End: 2024-07-20 | Stop reason: HOSPADM

## 2024-07-15 RX ORDER — AMLODIPINE BESYLATE 5 MG/1
5 TABLET ORAL DAILY
Status: DISCONTINUED | OUTPATIENT
Start: 2024-07-16 | End: 2024-07-16

## 2024-07-15 RX ORDER — FAMOTIDINE 20 MG/1
40 TABLET, FILM COATED ORAL NIGHTLY PRN
Status: DISCONTINUED | OUTPATIENT
Start: 2024-07-15 | End: 2024-07-20 | Stop reason: HOSPADM

## 2024-07-15 RX ORDER — ATORVASTATIN CALCIUM 20 MG/1
40 TABLET, FILM COATED ORAL DAILY
Status: DISCONTINUED | OUTPATIENT
Start: 2024-07-16 | End: 2024-07-20 | Stop reason: HOSPADM

## 2024-07-15 RX ADMIN — SODIUM CHLORIDE 500 ML: 9 INJECTION, SOLUTION INTRAVENOUS at 17:58

## 2024-07-15 RX ADMIN — ACETAMINOPHEN 1000 MG: 500 TABLET ORAL at 17:57

## 2024-07-15 RX ADMIN — SODIUM CHLORIDE 100 ML/HR: 9 INJECTION, SOLUTION INTRAVENOUS at 21:43

## 2024-07-15 NOTE — ED NOTES
Nursing report ED to floor  Allison Fitzpatrick  89 y.o.  female    HPI :  HPI (Adult)  Stated Reason for Visit: Pt to ED via PV for reports of pt being less responsive per pt daughter at an eye dr appt. pt is disoriented to time. Pt daughter states, she had an episode of dizziness and fell x1 week ago. Pt did not hit her head. Pt only takes 81mg ASA daily, but has not had it since last Wednesday in preparation for a cataract procedure.  History Obtained From: family    Chief Complaint  Chief Complaint   Patient presents with    Altered Mental Status       Admitting doctor:   Emily Irvin MD    Admitting diagnosis:   The primary encounter diagnosis was Syncope, unspecified syncope type. Diagnoses of Chronic renal impairment, unspecified CKD stage, Thyroid nodule, and Chronic anemia were also pertinent to this visit.    Code status:   Current Code Status       Date Active Code Status Order ID Comments User Context       7/15/2024 1759 CPR (Attempt to Resuscitate) [id number]  Emily Irvin MD ED        Question Answer    Code Status (Patient has no pulse and is not breathing) CPR (Attempt to Resuscitate)    Medical Interventions (Patient has pulse or is breathing) Full                    Allergies:   Patient has no known allergies.    Isolation:   No active isolations    Intake and Output  No intake or output data in the 24 hours ending 07/15/24 1802    Weight:       07/15/24  1520   Weight: 49 kg (108 lb 0.4 oz)       Most recent vitals:   Vitals:    07/15/24 1528 07/15/24 1735 07/15/24 1737 07/15/24 1751   BP:  130/70 102/58 102/58   BP Location:       Patient Position:  Lying Sitting    Pulse:  81 92 84   Resp:    16   Temp: 98.2 °F (36.8 °C)      TempSrc: Oral      SpO2:    98%   Weight:       Height:           Active LDAs/IV Access:   Lines, Drains & Airways       Active LDAs       Name Placement date Placement time Site Days    Peripheral IV 07/15/24 1526 Anterior;Right Forearm 07/15/24  1526   Forearm  less than 1                    Labs (abnormal labs have a star):   Labs Reviewed   COMPREHENSIVE METABOLIC PANEL - Abnormal; Notable for the following components:       Result Value    Glucose 163 (*)     BUN 34 (*)     Creatinine 1.50 (*)     eGFR 33.2 (*)     All other components within normal limits    Narrative:     GFR Normal >60  Chronic Kidney Disease <60  Kidney Failure <15    The GFR formula is only valid for adults with stable renal function between ages 18 and 70.   CBC WITH AUTO DIFFERENTIAL - Abnormal; Notable for the following components:    Hemoglobin 11.8 (*)     All other components within normal limits   TROPONIN - Abnormal; Notable for the following components:    HS Troponin T 39 (*)     All other components within normal limits    Narrative:     High Sensitive Troponin T Reference Range:  <14.0 ng/L- Negative Female for AMI  <22.0 ng/L- Negative Male for AMI  >=14 - Abnormal Female indicating possible myocardial injury.  >=22 - Abnormal Male indicating possible myocardial injury.   Clinicians would have to utilize clinical acumen, EKG, Troponin, and serial changes to determine if it is an Acute Myocardial Infarction or myocardial injury due to an underlying chronic condition.        URINALYSIS W/ MICROSCOPIC IF INDICATED (NO CULTURE) - Abnormal; Notable for the following components:    Glucose,  mg/dL (Trace) (*)     Protein, UA Trace (*)     Leuk Esterase, UA Small (1+) (*)     All other components within normal limits   URINALYSIS, MICROSCOPIC ONLY - Abnormal; Notable for the following components:    WBC, UA 6-10 (*)     All other components within normal limits   BNP (IN-HOUSE) - Normal    Narrative:     This assay is used as an aid in the diagnosis of individuals suspected of having heart failure. It can be used as an aid in the diagnosis of acute decompensated heart failure (ADHF) in patients presenting with signs and symptoms of ADHF to the emergency department (ED). In  addition, NT-proBNP of <300 pg/mL indicates ADHF is not likely.    Age Range Result Interpretation  NT-proBNP Concentration (pg/mL:      <50             Positive            >450                   Gray                 300-450                    Negative             <300    50-75           Positive            >900                  Gray                300-900                  Negative            <300      >75             Positive            >1800                  Gray                300-1800                  Negative            <300   HIGH SENSITIVITIY TROPONIN T 2HR   CBC AND DIFFERENTIAL    Narrative:     The following orders were created for panel order CBC & Differential.  Procedure                               Abnormality         Status                     ---------                               -----------         ------                     CBC Auto Differential[[id number]]        Abnormal            Final result                 Please view results for these tests on the individual orders.       EKG:   ECG 12 Lead Syncope   Preliminary Result   HEART RATE=82  bpm   RR Wewdlyje=696  ms   NH Qjcsiqxn=917  ms   P Horizontal Axis=-14  deg   P Front Axis=67  deg   QRSD Interval=92  ms   QT Kolaqdbq=127  ms   NXkH=949  ms   QRS Axis=47  deg   T Wave Axis=21  deg   - ABNORMAL ECG -   Sinus rhythm   Anteroseptal infarct, age indeterminate   Date and Time of Study:2024-07-15 15:33:48          Meds given in ED:   Medications   sodium chloride 0.9 % flush 10 mL (has no administration in time range)   sodium chloride 0.9 % bolus 500 mL (500 mL Intravenous New Bag 7/15/24 5036)   acetaminophen (TYLENOL) tablet 650 mg (has no administration in time range)     Or   acetaminophen (TYLENOL) 160 MG/5ML oral solution 650 mg (has no administration in time range)     Or   acetaminophen (TYLENOL) suppository 650 mg (has no administration in time range)   ondansetron ODT (ZOFRAN-ODT) disintegrating tablet 4 mg (has no administration in  time range)     Or   ondansetron (ZOFRAN) injection 4 mg (has no administration in time range)   melatonin tablet 2.5 mg (has no administration in time range)   sennosides-docusate (PERICOLACE) 8.6-50 MG per tablet 2 tablet (has no administration in time range)     And   polyethylene glycol (MIRALAX) packet 17 g (has no administration in time range)     And   bisacodyl (DULCOLAX) EC tablet 5 mg (has no administration in time range)     And   bisacodyl (DULCOLAX) suppository 10 mg (has no administration in time range)   sodium chloride 0.9 % infusion (has no administration in time range)   acetaminophen (TYLENOL) tablet 1,000 mg (1,000 mg Oral Given 7/15/24 1757)       Imaging results:  XR Chest 1 View    Result Date: 7/15/2024  1. Chronic changes in the chest without evidence for acute disease. 2. Mild soft tissue swelling anterior to the patella and the patellar tendon. No evidence for acute abnormality of the right tibia/fibula.      XR Tibia Fibula 2 View Bilateral    Result Date: 7/15/2024  1. Chronic changes in the chest without evidence for acute disease. 2. Mild soft tissue swelling anterior to the patella and the patellar tendon. No evidence for acute abnormality of the right tibia/fibula.      CT Head Without Contrast    Result Date: 7/15/2024  No acute process is identified. The etiology for the patient's syncope and confusion is not established. Further evaluation could be performed with a MRI examination of the brain as indicated.   CT CERVICAL SPINE WITHOUT CONTRAST: There is a grade 1 retrolisthesis of C3 upon C4 which appears similar to 7/15/2024. There is moderate loss of disc height from C5-T1, unchanged. There is no evidence of prevertebral edema or of a cervical fracture. The facets are fused bilaterally at C7/T1. There is partial visualization of an exophytic nodule involving the left lobe of the thyroid gland. This measures at least 21 mm in AP dimension.  C2-3: Small right paracentral disc  osteophyte complex is present with zones of herniation.  C3-4: A right paracentral disc osteophyte complex is present which abuts and mildly flattens the ventral surface of the cord. Mild foraminal stenosis is present on the right secondary to uncovertebral degenerative disease.  C4-5: There is severe facet degenerative disease on the right.  C5-6: A far lateral disc osteophyte complex is present to the left which abuts the anterolateral aspect the cord to the left.  C6-7: A far lateral disc osteophyte complex is present to the left which abuts the anterolateral aspect of the cord to the left, slightly more prominent as compared to C5-6.  C7-T1: There is no evidence of disc bulge or herniation.  IMPRESSION: 1.  There is no evidence of fracture. 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 There is fusion of the facets bilaterally at C7/T1. 2.  There is partial visualization of a nodule involving the left lobe of the thyroid gland posteriorly measuring 21 mm in AP dimension. Further evaluation with a thyroid ultrasound is recommended.    Radiation dose reduction techniques were utilized, including automated exposure control and exposure modulation based on body size.   This report was finalized on 7/15/2024 4:35 PM by Dr. Sravan Nugent M.D on Workstation: [id number]      CT Cervical Spine Without Contrast    Result Date: 7/15/2024  No acute process is identified. The etiology for the patient's syncope and confusion is not established. Further evaluation could be performed with a MRI examination of the brain as indicated.   CT CERVICAL SPINE WITHOUT CONTRAST: There is a grade 1 retrolisthesis of C3 upon C4 which appears similar to 7/15/2024. There is moderate loss of disc height from C5-T1, unchanged. There is no evidence of prevertebral edema or of a cervical fracture. The  facets are fused bilaterally at C7/T1. There is partial visualization of an exophytic nodule involving the left lobe of the thyroid gland. This measures at least 21 mm in AP dimension.  C2-3: Small right paracentral disc osteophyte complex is present with zones of herniation.  C3-4: A right paracentral disc osteophyte complex is present which abuts and mildly flattens the ventral surface of the cord. Mild foraminal stenosis is present on the right secondary to uncovertebral degenerative disease.  C4-5: There is severe facet degenerative disease on the right.  C5-6: A far lateral disc osteophyte complex is present to the left which abuts the anterolateral aspect the cord to the left.  C6-7: A far lateral disc osteophyte complex is present to the left which abuts the anterolateral aspect of the cord to the left, slightly more prominent as compared to C5-6.  C7-T1: There is no evidence of disc bulge or herniation.  IMPRESSION: 1.  There is no evidence of fracture. 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 There is fusion of the facets bilaterally at C7/T1. 2.  There is partial visualization of a nodule involving the left lobe of the thyroid gland posteriorly measuring 21 mm in AP dimension. Further evaluation with a thyroid ultrasound is recommended.    Radiation dose reduction techniques were utilized, including automated exposure control and exposure modulation based on body size.   This report was finalized on 7/15/2024 4:35 PM by Dr. Sravan Nugent M.D on Workstation: [id number]       Ambulatory status:   - w assist     Social issues:   Social History     Socioeconomic History    Marital status:    Tobacco Use    Smoking status: Never   Substance and Sexual Activity    Alcohol use: No    Drug use: No    Sexual activity: Defer       Peripheral  Neurovascular  Peripheral Neurovascular (Adult)  Peripheral Neurovascular WDL: .WDL except, neurovascular assessment lower  LLE Neurovascular Assessment  Temperature LLE: warm  RLE Neurovascular Assessment  Temperature RLE: warm  Sensation RLE: tenderness present    Neuro Cognitive  Neuro Cognitive (Adult)  Cognitive/Neuro/Behavioral WDL: .WDL except, orientation  Orientation: disoriented to, time (Pt knows the month, but reports it to be 2022.)  Additional Documentation: Peter Coma Scale (Group), NIH Stroke Scale (group)  Peter Coma Scale  Best Eye Response: 4-->(E4) spontaneous  Best Motor Response: 6-->(M6) obeys commands  Best Verbal Response: 4-->(V4) confused  Peter Coma Scale Score: 14  NIH Stroke Scale  Interval: baseline  1a. Level of Consciousness: 0-->Alert, keenly responsive  1b. LOC Questions: 0-->Answers both questions correctly  1c. LOC Commands: 0-->Performs both tasks correctly  2. Best Gaze: 0-->Normal  3. Visual: 0-->No visual loss  4. Facial Palsy: 0-->Normal symmetrical movements  5a. Motor Arm, Left: 0-->No drift, limb holds 90 (or 45) degrees for full 10 secs  5b. Motor Arm, Right: 0-->No drift, limb holds 90 (or 45) degrees for full 10 secs  6a. Motor Leg, Left: 0-->No drift, leg holds 30 degree position for full 5 secs  6b. Motor Leg, Right: 0-->No drift, leg holds 30 degree position for full 5 secs  7. Limb Ataxia: 0-->Absent  8. Sensory: 0-->Normal, no sensory loss  9. Best Language: 0-->No aphasia, normal  10. Dysarthria: 0-->Normal  11. Extinction and Inattention (formerly Neglect): 0-->No abnormality  Total (NIH Stroke Scale): 0    Learning  Learning Assessment (Adult)  Learning Readiness and Ability: (S) cognitive limitation noted, sensory deficit noted, physical limitation noted (Pt uses a walker at baseline, is hard of hearing and only able to hear in the right ear, pt is disoriented to time.)    Respiratory  Respiratory (Adult)  Airway WDL: WDL  Respiratory WDL  Respiratory  WDL: WDL    Abdominal Pain  Safety Interventions  Safety Precautions/Falls Reduction: family at bedside    Pain Assessments  Pain (Adult)  (0-10) Pain Rating: Rest: 0    NIH Stroke Scale  NIH Stroke Scale  Interval: baseline  1a. Level of Consciousness: 0-->Alert, keenly responsive  1b. LOC Questions: 0-->Answers both questions correctly  1c. LOC Commands: 0-->Performs both tasks correctly  2. Best Gaze: 0-->Normal  3. Visual: 0-->No visual loss  4. Facial Palsy: 0-->Normal symmetrical movements  5a. Motor Arm, Left: 0-->No drift, limb holds 90 (or 45) degrees for full 10 secs  5b. Motor Arm, Right: 0-->No drift, limb holds 90 (or 45) degrees for full 10 secs  6a. Motor Leg, Left: 0-->No drift, leg holds 30 degree position for full 5 secs  6b. Motor Leg, Right: 0-->No drift, leg holds 30 degree position for full 5 secs  7. Limb Ataxia: 0-->Absent  8. Sensory: 0-->Normal, no sensory loss  9. Best Language: 0-->No aphasia, normal  10. Dysarthria: 0-->Normal  11. Extinction and Inattention (formerly Neglect): 0-->No abnormality  Total (NIH Stroke Scale): 0    Zoe Little RN  07/15/24 18:02 EDT

## 2024-07-15 NOTE — ED PROVIDER NOTES
EMERGENCY DEPARTMENT ENCOUNTER  Room Number:  17/17  PCP: Haleigh Nicolas MD  Independent Historians: Patient and Family      HPI:  Chief Complaint: had concerns including Altered Mental Status.     A complete HPI/ROS/PMH/PSH/SH/FH are unobtainable due to: Hard of hearing    Chronic or social conditions impacting patient care (Social Determinants of Health): None      Context: Allison Fitzpatrick is a 89 y.o. female with a medical history of diverticulitis, hypertension, irregular heartbeat, syncope, dementia who presents to the ED c/o acute syncopal episode.  Family reports that they were taking the patient to an eye doctor today and she suddenly stopped walking and slumped down.  Family had to help her to the ground.  They report she was unresponsive.  They report that 1 week ago she had an episode of dizziness and fell.  It was not witnessed.  She states she did not hit her head.  She is on 81 mg aspirin but has not had it since last Wednesday in preparation for her cataract procedure.  She is not able to provide much history.  She is severely hard of hearing.  She does complain of some posterior neck pain.  Family notes right leg bruising.      Review of prior external notes (non-ED) -and- Review of prior external test results outside of this encounter:  Cardiology note dated 3/30/2018 with syncope.  Plan outpatient tilt table test versus loop recorder if she were to have further syncopal episodes.    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    HLD (hyperlipidemia) 03/28/2018    Chest pain 03/28/2018    Community acquired pneumonia of left lung 08/22/2020    HARRY (acute kidney injury) 08/23/2020    Acute  ischemic stroke 2020    Cerebrovascular disease 2020    Medicare annual wellness visit, subsequent 2021    Fatigue 2021    Chest wall tenderness 2021    Difficulty in walking, not elsewhere classified 2020    Dementia 2023     Resolved Ambulatory Problems     Diagnosis Date Noted    Syncope and collapse 2018    Fracture of one rib of left side 2018     Past Medical History:   Diagnosis Date    Arthritis     Arthritis     Bladder problem     Difficulty walking     Diverticulitis     Diverticulosis     Hard of hearing     Headache, tension-type     History of colon polyps     Hypertension     Irregular heart beat     Macular degeneration     Memory loss     Sleep apnea          PAST SURGICAL HISTORY  Past Surgical History:   Procedure Laterality Date    ABDOMINAL SURGERY      CATARACT EXTRACTION Bilateral     COLONOSCOPY N/A 2016    ih, tics, hp polyp     COLONOSCOPY W/ POLYPECTOMY      ENDOSCOPY N/A 2016    nml     HYSTERECTOMY      TONSILLECTOMY           FAMILY HISTORY  Family History   Problem Relation Age of Onset    Colon cancer Mother     Arthritis Mother     Dementia Mother     Diabetes type II Sister     Diabetes type II Brother     Stomach cancer Brother     Coronary artery disease Father 60         MI in 60s    Breast cancer Neg Hx     Stroke Neg Hx          SOCIAL HISTORY  Social History     Socioeconomic History    Marital status:    Tobacco Use    Smoking status: Never   Substance and Sexual Activity    Alcohol use: No    Drug use: No    Sexual activity: Defer         ALLERGIES  Patient has no known allergies.      REVIEW OF SYSTEMS  Review of Systems  Included in HPI  All systems reviewed and negative except for those discussed in HPI.      PHYSICAL EXAM    I have reviewed the triage vital signs and nursing notes.    ED Triage Vitals   Temp Heart Rate Resp BP SpO2   07/15/24 1528 07/15/24 1520 07/15/24 1520 07/15/24 1520 07/15/24  1520   98.2 °F (36.8 °C) 88 16 133/59 95 %      Temp src Heart Rate Source Patient Position BP Location FiO2 (%)   07/15/24 1528 07/15/24 1520 07/15/24 1520 07/15/24 1520 --   Oral Monitor Sitting Right arm        Physical Exam  GENERAL: Awake, alert, no acute distress  SKIN: Warm, dry  HENT: Normocephalic, atraumatic  EYES: no scleral icterus  CV: regular rhythm, regular rate  RESPIRATORY: normal effort, lungs clear  ABDOMEN: soft, nontender, nondistended  MUSCULOSKELETAL: no deformity  NEURO: alert, moves all extremities, follows commands, equal upper and lower extremity strength and sensation.  Cranial nerves II through XII intact            LAB RESULTS  Recent Results (from the past 24 hour(s))   Comprehensive Metabolic Panel    Collection Time: 07/15/24  3:26 PM    Specimen: Blood   Result Value Ref Range    Glucose 163 (H) 65 - 99 mg/dL    BUN 34 (H) 8 - 23 mg/dL    Creatinine 1.50 (H) 0.57 - 1.00 mg/dL    Sodium 143 136 - 145 mmol/L    Potassium 4.5 3.5 - 5.2 mmol/L    Chloride 107 98 - 107 mmol/L    CO2 23.3 22.0 - 29.0 mmol/L    Calcium 9.1 8.6 - 10.5 mg/dL    Total Protein 6.6 6.0 - 8.5 g/dL    Albumin 4.2 3.5 - 5.2 g/dL    ALT (SGPT) 14 1 - 33 U/L    AST (SGOT) 20 1 - 32 U/L    Alkaline Phosphatase 46 39 - 117 U/L    Total Bilirubin 0.6 0.0 - 1.2 mg/dL    Globulin 2.4 gm/dL    A/G Ratio 1.8 g/dL    BUN/Creatinine Ratio 22.7 7.0 - 25.0    Anion Gap 12.7 5.0 - 15.0 mmol/L    eGFR 33.2 (L) >60.0 mL/min/1.73   CBC Auto Differential    Collection Time: 07/15/24  3:26 PM    Specimen: Blood   Result Value Ref Range    WBC 7.03 3.40 - 10.80 10*3/mm3    RBC 3.85 3.77 - 5.28 10*6/mm3    Hemoglobin 11.8 (L) 12.0 - 15.9 g/dL    Hematocrit 36.5 34.0 - 46.6 %    MCV 94.8 79.0 - 97.0 fL    MCH 30.6 26.6 - 33.0 pg    MCHC 32.3 31.5 - 35.7 g/dL    RDW 12.8 12.3 - 15.4 %    RDW-SD 43.4 37.0 - 54.0 fl    MPV 10.4 6.0 - 12.0 fL    Platelets 180 140 - 450 10*3/mm3    Neutrophil % 55.7 42.7 - 76.0 %    Lymphocyte % 32.6 19.6 -  45.3 %    Monocyte % 9.8 5.0 - 12.0 %    Eosinophil % 1.4 0.3 - 6.2 %    Basophil % 0.4 0.0 - 1.5 %    Immature Grans % 0.1 0.0 - 0.5 %    Neutrophils, Absolute 3.91 1.70 - 7.00 10*3/mm3    Lymphocytes, Absolute 2.29 0.70 - 3.10 10*3/mm3    Monocytes, Absolute 0.69 0.10 - 0.90 10*3/mm3    Eosinophils, Absolute 0.10 0.00 - 0.40 10*3/mm3    Basophils, Absolute 0.03 0.00 - 0.20 10*3/mm3    Immature Grans, Absolute 0.01 0.00 - 0.05 10*3/mm3    nRBC 0.0 0.0 - 0.2 /100 WBC   High Sensitivity Troponin T    Collection Time: 07/15/24  3:26 PM    Specimen: Blood   Result Value Ref Range    HS Troponin T 39 (H) <14 ng/L   BNP    Collection Time: 07/15/24  3:26 PM    Specimen: Blood   Result Value Ref Range    proBNP 175.0 0.0 - 1,800.0 pg/mL   ECG 12 Lead Syncope    Collection Time: 07/15/24  3:33 PM   Result Value Ref Range    QT Interval 361 ms    QTC Interval 422 ms   Urinalysis With Microscopic If Indicated (No Culture) - Urine, Clean Catch    Collection Time: 07/15/24  5:32 PM    Specimen: Urine, Clean Catch   Result Value Ref Range    Color, UA Yellow Yellow, Straw    Appearance, UA Clear Clear    pH, UA 5.5 5.0 - 8.0    Specific Gravity, UA 1.017 1.005 - 1.030    Glucose,  mg/dL (Trace) (A) Negative    Ketones, UA Negative Negative    Bilirubin, UA Negative Negative    Blood, UA Negative Negative    Protein, UA Trace (A) Negative    Leuk Esterase, UA Small (1+) (A) Negative    Nitrite, UA Negative Negative    Urobilinogen, UA 1.0 E.U./dL 0.2 - 1.0 E.U./dL   Urinalysis, Microscopic Only - Urine, Clean Catch    Collection Time: 07/15/24  5:32 PM    Specimen: Urine, Clean Catch   Result Value Ref Range    RBC, UA 0-2 None Seen, 0-2 /HPF    WBC, UA 6-10 (A) None Seen, 0-2 /HPF    Bacteria, UA None Seen None Seen /HPF    Squamous Epithelial Cells, UA 0-2 None Seen, 0-2 /HPF    Hyaline Casts, UA 0-2 None Seen /LPF    Methodology Automated Microscopy          RADIOLOGY  XR Chest 1 View, XR Tibia Fibula 2 View  Bilateral    Result Date: 7/15/2024  CHEST SINGLE VIEW RIGHT AND RIGHT TIBIA/FIBULA: AP, LATERAL OF THE  HISTORY: Syncope. Right leg bruising  COMPARISON: Portable upright chest 12/18/2023, 08/22/2020  FINDINGS: There is chronic elevation of the right hemidiaphragm. Heart size is within normal limits. Thoracic aorta is tortuous and aortic vascular calcifications are present. There are increased interstitial markings that appear chronic and similar to the previous exam. Small calcified nodules present the right lung base. There is also calcified nodule in medial left lung base. There is no evidence for pulmonary edema or infiltrate. Cardiac monitoring leads are noted. The bones are osteopenic.  RIGHT TIBIA/FIBULA: There is no evidence for fracture or acute abnormality of the right tibia/fibula. There is soft tissue swelling anterior to the patella and the patellar tendon.  LEFT TIBIA/FIBULA: There is no evidence for fracture or acute osseous abnormality of the left tibia or fibula. Soft tissues appear within normal limits.      1. Chronic changes in the chest without evidence for acute disease. 2. Mild soft tissue swelling anterior to the patella and the patellar tendon. No evidence for acute abnormality of the right tibia/fibula.      CT Head Without Contrast, CT Cervical Spine Without Contrast    Result Date: 7/15/2024  CT HEAD AND CERVICAL SPINE WITHOUT CONTRAST  HISTORY: Syncope, confusion, neck pain, fall.  COMPARISON: MRI brain 8/23/2020  CT HEAD WITHOUT CONTRAST: The brain and ventricles are symmetrical. Decreased attenuation involving the white matter of the cerebral hemispheres is appreciated bilaterally consistent with moderate small vessel ischemic disease. Moderate vascular calcification is noted. No focal area of decreased attenuation to suggest acute infarction or contusion is appreciated. Bone windows show no evidence of a calvarial fracture.      No acute process is identified. The etiology for the  patient's syncope and confusion is not established. Further evaluation could be performed with a MRI examination of the brain as indicated.   CT CERVICAL SPINE WITHOUT CONTRAST: There is a grade 1 retrolisthesis of C3 upon C4 which appears similar to 7/15/2024. There is moderate loss of disc height from C5-T1, unchanged. There is no evidence of prevertebral edema or of a cervical fracture. The facets are fused bilaterally at C7/T1. There is partial visualization of an exophytic nodule involving the left lobe of the thyroid gland. This measures at least 21 mm in AP dimension.  C2-3: Small right paracentral disc osteophyte complex is present with zones of herniation.  C3-4: A right paracentral disc osteophyte complex is present which abuts and mildly flattens the ventral surface of the cord. Mild foraminal stenosis is present on the right secondary to uncovertebral degenerative disease.  C4-5: There is severe facet degenerative disease on the right.  C5-6: A far lateral disc osteophyte complex is present to the left which abuts the anterolateral aspect the cord to the left.  C6-7: A far lateral disc osteophyte complex is present to the left which abuts the anterolateral aspect of the cord to the left, slightly more prominent as compared to C5-6.  C7-T1: There is no evidence of disc bulge or herniation.  IMPRESSION: 1.  There is no evidence of fracture. 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 There is fusion of the facets bilaterally at C7/T1. 2.  There is partial visualization of a nodule involving the left lobe of the thyroid gland posteriorly measuring 21 mm in AP dimension. Further evaluation with a thyroid ultrasound is recommended.    Radiation dose reduction techniques were utilized, including automated exposure control and exposure modulation based on body size.    This report was finalized on 7/15/2024 4:35 PM by Dr. Sravan Nugent M.D on Workstation: [id number]         MEDICATIONS GIVEN IN ER  Medications   sodium chloride 0.9 % flush 10 mL (has no administration in time range)   acetaminophen (TYLENOL) tablet 1,000 mg (has no administration in time range)   sodium chloride 0.9 % bolus 500 mL (has no administration in time range)         ORDERS PLACED DURING THIS VISIT:  Orders Placed This Encounter   Procedures    XR Chest 1 View    CT Head Without Contrast    CT Cervical Spine Without Contrast    XR Tibia Fibula 2 View Bilateral    Comprehensive Metabolic Panel    CBC Auto Differential    High Sensitivity Troponin T    BNP    Urinalysis With Microscopic If Indicated (No Culture) - Urine, Clean Catch    High Sensitivity Troponin T 2Hr    Urinalysis, Microscopic Only - Urine, Clean Catch    Orthostatic Vitals    [facility] (on-call MD unless specified) Details    ECG 12 Lead Syncope    Insert Peripheral IV    Initiate Observation Status    CBC & Differential         OUTPATIENT MEDICATION MANAGEMENT:  Current Facility-Administered Medications Ordered in Epic   Medication Dose Route Frequency Provider Last Rate Last Admin    acetaminophen (TYLENOL) tablet 1,000 mg  1,000 mg Oral Once Asher Mcknight MD        sodium chloride 0.9 % bolus 500 mL  500 mL Intravenous Once Asher Mcknight MD        sodium chloride 0.9 % flush 10 mL  10 mL Intravenous PRN Asher Mcknight MD         Current Outpatient Medications Ordered in Epic   Medication Sig Dispense Refill    amLODIPine (NORVASC) 5 MG tablet TAKE 1 TABLET BY MOUTH DAILY 90 tablet 3    aspirin 81 MG chewable tablet Chew 1 tablet Daily.      atorvastatin (LIPITOR) 40 MG tablet TAKE 1 TABLET BY MOUTH DAILY 90 tablet 3    famotidine (PEPCID) 40 MG tablet TAKE 1 TABLET BY MOUTH AT NIGHT  AS NEEDED FOR HEARTBURN 90 tablet 3    acetaminophen (TYLENOL) 650 MG 8 hr tablet Take 1 tablet by mouth Every 8 (Eight) Hours As Needed for Mild  Pain.      loperamide (IMODIUM) 2 MG capsule Take 1 capsule by mouth 4 (Four) Times a Day As Needed for Diarrhea.      Mirabegron ER (Myrbetriq) 25 MG tablet sustained-release 24 hour 24 hr tablet Take 1 tablet by mouth Daily. Urinary incontinence 90 tablet 1    neomycin-polymyxin-hydrocortisone (CORTISPORIN) 3.5-75037-2 otic solution Administer 3 drops into both ears 3 (Three) Times a Day. 10 mL 0    triamcinolone (KENALOG) 0.1 % ointment Apply 1 Application topically to the appropriate area as directed 2 (Two) Times a Day. ears 30 g 1         PROCEDURES  Procedures            PROGRESS, DATA ANALYSIS, CONSULTS, AND MEDICAL DECISION MAKING  All labs have been independently interpreted by me.  All radiology studies have been reviewed by me. All EKG's have been independently viewed and interpreted by me.  Discussion below represents my analysis of pertinent findings related to patient's condition, differential diagnosis, treatment plan and final disposition.    Differential diagnosis includes but is not limited to stroke, syncope, arrhythmia, hypotension, orthostasis.    Clinical Scores:            Total (NIH Stroke Scale): 0      ED Course as of 07/15/24 1753   Mon Jul 15, 2024   1602 CT Head Without Contrast  My independent interpretation of the imaging study is no acute hemorrhage [TR]   1605 EKG          EKG time: 1533  Rhythm/Rate: Normal sinus, rate 82  P waves and UT: Normal P, normal UT  QRS, axis: Narrow QRS, normal axis  ST and T waves: No acute    Independently Interpreted by me  Not significantly changed compared to prior 12/18/2023   [TR]   1724 I reviewed the workup and findings with the patient and family at the bedside.  Answered all questions.  The patient had a syncopal episode today.  Exact etiology at this time is unclear.  Plan admission for further evaluation given her age and presentation.  The patient and family are agreeable.  Family does report the patient has bad sundowning syndrome when she  gets admitted.  They reports she typically lives with her son and does not need a lot of support at home. [TR]   1752 Discussing with Dr. Irvin with A.  She agrees to admit. [TR]      ED Course User Index  [TR] Asher Mcknight MD             AS OF 17:53 EDT VITALS:    BP - 102/58  HR - 84  TEMP - 98.2 °F (36.8 °C) (Oral)  O2 SATS - 98%    COMPLEXITY OF CARE  The patient requires admission.      DIAGNOSIS  Final diagnoses:   Syncope, unspecified syncope type   Chronic renal impairment, unspecified CKD stage   Thyroid nodule   Chronic anemia         DISPOSITION  ED Disposition       ED Disposition   Decision to Admit    Condition   --    Comment   Level of Care: Telemetry [5]   Diagnosis: Syncope [103590]   Admitting Physician: SHERMAN IRVIN [[id number]]   Attending Physician: SHERMAN IRVIN [[id number]]   Bed Request Comments: not [address]                  Please note that portions of this document were completed with a voice recognition program.    Note Disclaimer: At [facility], we believe that sharing information builds trust and better relationships. You are receiving this note because you recently visited [facility]. It is possible you will see health information before a provider has talked with you about it. This kind of information can be easy to misunderstand. To help you fully understand what it means for your health, we urge you to discuss this note with your provider.         Asher Mcknight MD  07/15/24 4187

## 2024-07-16 ENCOUNTER — APPOINTMENT (OUTPATIENT)
Dept: CARDIOLOGY | Facility: HOSPITAL | Age: 89
DRG: 312 | End: 2024-07-16
Payer: MEDICARE

## 2024-07-16 PROBLEM — I95.1 ORTHOSTATIC SYNCOPE: Status: ACTIVE | Noted: 2024-07-16

## 2024-07-16 LAB
ANION GAP SERPL CALCULATED.3IONS-SCNC: 11 MMOL/L (ref 5–15)
BH CV XLRA MEAS LEFT DIST CCA EDV: 15.7 CM/SEC
BH CV XLRA MEAS LEFT DIST CCA PSV: 60 CM/SEC
BH CV XLRA MEAS LEFT DIST ICA EDV: -21.6 CM/SEC
BH CV XLRA MEAS LEFT DIST ICA PSV: -69.8 CM/SEC
BH CV XLRA MEAS LEFT ICA/CCA RATIO: -1.16
BH CV XLRA MEAS LEFT MID ICA EDV: -17.9 CM/SEC
BH CV XLRA MEAS LEFT MID ICA PSV: -51.5 CM/SEC
BH CV XLRA MEAS LEFT PROX CCA EDV: -16.5 CM/SEC
BH CV XLRA MEAS LEFT PROX CCA PSV: -82.3 CM/SEC
BH CV XLRA MEAS LEFT PROX ECA EDV: 7 CM/SEC
BH CV XLRA MEAS LEFT PROX ECA PSV: 53.2 CM/SEC
BH CV XLRA MEAS LEFT PROX ICA EDV: -14.7 CM/SEC
BH CV XLRA MEAS LEFT PROX ICA PSV: -48.3 CM/SEC
BH CV XLRA MEAS LEFT PROX SCLA PSV: -72.7 CM/SEC
BH CV XLRA MEAS LEFT VERTEBRAL A EDV: -9.1 CM/SEC
BH CV XLRA MEAS LEFT VERTEBRAL A PSV: -31 CM/SEC
BH CV XLRA MEAS RIGHT DIST CCA EDV: 12.5 CM/SEC
BH CV XLRA MEAS RIGHT DIST CCA PSV: 47.4 CM/SEC
BH CV XLRA MEAS RIGHT DIST ICA EDV: 17.2 CM/SEC
BH CV XLRA MEAS RIGHT DIST ICA PSV: 74.2 CM/SEC
BH CV XLRA MEAS RIGHT ICA/CCA RATIO: 1.57
BH CV XLRA MEAS RIGHT MID ICA EDV: -12.5 CM/SEC
BH CV XLRA MEAS RIGHT MID ICA PSV: -50.8 CM/SEC
BH CV XLRA MEAS RIGHT PROX CCA EDV: 18 CM/SEC
BH CV XLRA MEAS RIGHT PROX CCA PSV: 62.7 CM/SEC
BH CV XLRA MEAS RIGHT PROX ECA EDV: -9 CM/SEC
BH CV XLRA MEAS RIGHT PROX ECA PSV: -57.6 CM/SEC
BH CV XLRA MEAS RIGHT PROX ICA EDV: -11.6 CM/SEC
BH CV XLRA MEAS RIGHT PROX ICA PSV: -36.7 CM/SEC
BH CV XLRA MEAS RIGHT PROX SCLA PSV: 56.6 CM/SEC
BH CV XLRA MEAS RIGHT VERTEBRAL A EDV: 11 CM/SEC
BH CV XLRA MEAS RIGHT VERTEBRAL A PSV: 39.8 CM/SEC
BUN SERPL-MCNC: 29 MG/DL (ref 8–23)
BUN/CREAT SERPL: 34.9 (ref 7–25)
CALCIUM SPEC-SCNC: 8.8 MG/DL (ref 8.6–10.5)
CHLORIDE SERPL-SCNC: 109 MMOL/L (ref 98–107)
CO2 SERPL-SCNC: 24 MMOL/L (ref 22–29)
CREAT SERPL-MCNC: 0.83 MG/DL (ref 0.57–1)
DEPRECATED RDW RBC AUTO: 42.9 FL (ref 37–54)
EGFRCR SERPLBLD CKD-EPI 2021: 67.5 ML/MIN/1.73
ERYTHROCYTE [DISTWIDTH] IN BLOOD BY AUTOMATED COUNT: 12.5 % (ref 12.3–15.4)
GLUCOSE SERPL-MCNC: 92 MG/DL (ref 65–99)
HCT VFR BLD AUTO: 35.6 % (ref 34–46.6)
HGB BLD-MCNC: 11.6 G/DL (ref 12–15.9)
LEFT ARM BP: NORMAL MMHG
MCH RBC QN AUTO: 30.8 PG (ref 26.6–33)
MCHC RBC AUTO-ENTMCNC: 32.6 G/DL (ref 31.5–35.7)
MCV RBC AUTO: 94.4 FL (ref 79–97)
PLATELET # BLD AUTO: 171 10*3/MM3 (ref 140–450)
PMV BLD AUTO: 10.6 FL (ref 6–12)
POTASSIUM SERPL-SCNC: 3.7 MMOL/L (ref 3.5–5.2)
QT INTERVAL: 361 MS
QTC INTERVAL: 422 MS
RBC # BLD AUTO: 3.77 10*6/MM3 (ref 3.77–5.28)
RIGHT ARM BP: NORMAL MMHG
SODIUM SERPL-SCNC: 144 MMOL/L (ref 136–145)
WBC NRBC COR # BLD AUTO: 6.65 10*3/MM3 (ref 3.4–10.8)

## 2024-07-16 PROCEDURE — 25810000003 SODIUM CHLORIDE 0.9 % SOLUTION: Performed by: STUDENT IN AN ORGANIZED HEALTH CARE EDUCATION/TRAINING PROGRAM

## 2024-07-16 PROCEDURE — 25810000003 SODIUM CHLORIDE 0.9 % SOLUTION: Performed by: INTERNAL MEDICINE

## 2024-07-16 PROCEDURE — 80048 BASIC METABOLIC PNL TOTAL CA: CPT | Performed by: INTERNAL MEDICINE

## 2024-07-16 PROCEDURE — 93880 EXTRACRANIAL BILAT STUDY: CPT | Performed by: SURGERY

## 2024-07-16 PROCEDURE — 97166 OT EVAL MOD COMPLEX 45 MIN: CPT

## 2024-07-16 PROCEDURE — 93880 EXTRACRANIAL BILAT STUDY: CPT

## 2024-07-16 PROCEDURE — 25010000002 CEFTRIAXONE PER 250 MG: Performed by: STUDENT IN AN ORGANIZED HEALTH CARE EDUCATION/TRAINING PROGRAM

## 2024-07-16 PROCEDURE — 97162 PT EVAL MOD COMPLEX 30 MIN: CPT

## 2024-07-16 PROCEDURE — 85027 COMPLETE CBC AUTOMATED: CPT | Performed by: INTERNAL MEDICINE

## 2024-07-16 RX ORDER — QUETIAPINE FUMARATE 25 MG/1
25 TABLET, FILM COATED ORAL NIGHTLY
Status: DISCONTINUED | OUTPATIENT
Start: 2024-07-16 | End: 2024-07-20 | Stop reason: HOSPADM

## 2024-07-16 RX ADMIN — ACETAMINOPHEN 325MG 650 MG: 325 TABLET ORAL at 10:21

## 2024-07-16 RX ADMIN — CEFTRIAXONE SODIUM 1000 MG: 1 INJECTION, POWDER, FOR SOLUTION INTRAMUSCULAR; INTRAVENOUS at 11:38

## 2024-07-16 RX ADMIN — SODIUM CHLORIDE 100 ML/HR: 9 INJECTION, SOLUTION INTRAVENOUS at 18:22

## 2024-07-16 RX ADMIN — ATORVASTATIN CALCIUM 40 MG: 20 TABLET, FILM COATED ORAL at 10:21

## 2024-07-16 RX ADMIN — SODIUM CHLORIDE 500 ML: 9 INJECTION, SOLUTION INTRAVENOUS at 10:17

## 2024-07-16 RX ADMIN — QUETIAPINE FUMARATE 25 MG: 25 TABLET ORAL at 21:52

## 2024-07-16 NOTE — DISCHARGE PLACEMENT REQUEST
"Allison Bradshaw (89 y.o. Female)       Date of Birth   1934    Social Security Number       Address   [address] [place] [address]    Home Phone   [phone]    MRN   [id number]       Hindu   None    Marital Status                               Admission Date   7/15/24    Admission Type   Emergency    Admitting Provider   Emily Irvin MD    Attending Provider   Kareem Wallace MD    Department, Room/Bed   [address], E452/1       Discharge Date       Discharge Disposition       Discharge Destination                                 Attending Provider: Kareem Wallace MD    Allergies: No Known Allergies    Isolation: None   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)   Code Status: No CPR    Ht: 162.6 cm (64\")   Wt: 49 kg (108 lb 0.4 oz)    Admission Cmt: None   Principal Problem: Orthostatic syncope [I95.1]                   Active Insurance as of 7/15/2024       Primary Coverage       Payor Plan Insurance Group Employer/Plan Group    MEDICARE MEDICARE A & B        Payor Plan Address Payor Plan Phone Number Payor Plan Fax Number Effective Dates    [address] [phone]  9/1/1998 - None Entered    [facility] [id number]         Subscriber Name Subscriber Birth Date Member ID       ALLISON BRADSHAW 1934 [id number]               Secondary Coverage       Payor Plan Insurance Group Employer/Plan Group    ANTHEM BLUE CROSS ANTHEM BLUE CROSS BLUE SHIELD PPO [id number]       Payor Plan Address Payor Plan Phone Number Payor Plan Fax Number Effective Dates    [address] [phone]  1/1/2011 - None Entered    [facility] [id number]         Subscriber Name Subscriber Birth Date Member ID       ALLISON BRADSHAW 1934 [id number]                     Emergency Contacts        (Rel.) Home Phone Work Phone Mobile Phone    Tish Sánchez (Daughter) [phone]    ElvisJayro carr (Son) -- -- [phone]    " Allison Cardona (Grandchild) -- -- [phone]

## 2024-07-16 NOTE — NURSING NOTE
07/16/24 4260   Skin Interventions   Pressure Reduction Devices alternating pressure pump (ADD)  (On Accumax pump)   Pressure Reduction Techniques heels elevated off bed;positioned off wounds;pressure points protected     Wound/Ostomy: We see patient at the request of the floor nurse regarding skin issue on Coccyx area. Patient is alert, able to turn with assistance, upon assessment is observed slight redness in the Coccyx, bilateral heels but blanchable, Optifoam dressing to facilitate cushioning is ordered.  Heels must be kept off-loaded at all times.  Please re-consult for any additional need.

## 2024-07-16 NOTE — THERAPY EVALUATION
Patient Name: Allison Fitzpatrick  : 1934    MRN: [id number]                              Today's Date: 2024       Admit Date: 7/15/2024    Visit Dx:     ICD-10-CM ICD-9-CM   1. Syncope, unspecified syncope type  R55 780.2   2. Chronic renal impairment, unspecified CKD stage  N18.9 585.9   3. Thyroid nodule  E04.1 241.0   4. Chronic anemia  D64.9 285.9     Patient Active Problem List   Diagnosis    Diverticulitis of large intestine    Near syncope    Essential hypertension    Bilateral hearing loss    Gait instability    Cholesteatoma of attic of left ear    HLD (hyperlipidemia)    Chest pain    Community acquired pneumonia of left lung    HARRY (acute kidney injury)    Acute ischemic stroke    Cerebrovascular disease    Medicare annual wellness visit, subsequent    Fatigue    Chest wall tenderness    Difficulty in walking, not elsewhere classified    Dementia    Syncope     Past Medical History:   Diagnosis Date    Acute ischemic stroke 2020    Arthritis     Arthritis     Bilateral hearing loss     Bladder problem     Dementia     Difficulty walking     Diverticulitis     Diverticulosis     Fracture of one rib of left side 3/28/2018    Hard of hearing     Headache, tension-type     History of colon polyps     Hypertension     Irregular heart beat     Macular degeneration     Memory loss     Sleep apnea     Syncope and collapse 3/28/2018     Past Surgical History:   Procedure Laterality Date    ABDOMINAL SURGERY      CATARACT EXTRACTION Bilateral     COLONOSCOPY N/A 2016    ih, tics, hp polyp     COLONOSCOPY W/ POLYPECTOMY      ENDOSCOPY N/A 2016    nml     HYSTERECTOMY      TONSILLECTOMY        General Information       Row Name 24 1008          Physical Therapy Time and Intention    Document Type evaluation  -ST     Mode of Treatment physical therapy;co-treatment  co-tx utilized as two sets of skilled hands needed to maximally and safely progress mobility  -ST       Row Name 24  1008          General Information    Patient Profile Reviewed yes  -ST     Existing Precautions/Restrictions fall;orthostatic hypotension  -ST     Barriers to Rehab cognitive status;hearing deficit  -       Row Name 07/16/24 1008          Living Environment    People in Home child(mila), adult  -       Row Name 07/16/24 1008          Home Main Entrance    Number of Stairs, Main Entrance two  -ST     Stair Railings, Main Entrance railings safe and in good condition  -       Row Name 07/16/24 1008          Cognition    Orientation Status (Cognition) oriented to;person  -[facility] Name 07/16/24 1008          Safety Issues, Functional Mobility    Safety Issues Affecting Function (Mobility) insight into deficits/self-awareness;awareness of need for assistance  -ST     Impairments Affecting Function (Mobility) balance;endurance/activity tolerance;postural/trunk control;pain  -ST     Comment, Safety Issues/Impairments (Mobility) gait belt, nonskid socks donned  -ST               User Key  (r) = Recorded By, (t) = Taken By, (c) = Cosigned By      Initials Name Provider Type    Modesta Armas PT Physical Therapist                   Mobility       Row Name 07/16/24 1008          Bed Mobility    Bed Mobility supine-sit;sit-supine  -ST     Supine-Sit [place] (Bed Mobility) contact guard;minimum assist (75% patient effort)  -ST     Sit-Supine [place] (Bed Mobility) moderate assist (50% patient effort);2 person assist  -ST     Assistive Device (Bed Mobility) bed rails;head of bed elevated  -[facility] Name 07/16/24 1008          Sit-Stand Transfer    Sit-Stand [place] (Transfers) minimum assist (75% patient effort);moderate assist (50% patient effort);2 person assist  -ST     Comment, (Sit-Stand Transfer) HH x 2  -ST       Row Name 07/16/24 1008          Gait/Stairs (Locomotion)    [place] Level (Gait) not tested  -ST     Deviations/Abnormal Patterns (Gait) gait speed decreased;stride length  decreased;alisa decreased  -ST     Bilateral Gait Deviations forward flexed posture;heel strike decreased  -ST     Comment, (Gait/Stairs) unable to progress to ambulation as pt with significant orthostatic hypotension  -ST               User Key  (r) = Recorded By, (t) = Taken By, (c) = Cosigned By      Initials Name Provider Type    ST Modesta Cummings, PT Physical Therapist                   Obj/Interventions       [facility] Name 07/16/24 1009          Range of Motion Comprehensive    Comment, General Range of Motion bilat LE WFL  -[facility] Name 07/16/24 1009          Strength Comprehensive (MMT)    Comment, General Manual Muscle Testing (MMT) Assessment bilat LE L  -[facility] Name 07/16/24 1009          Balance    Comment, Balance min A of 2 to stand initially but mod to max A of 2 for static standing as BP assess and orthostatic hypotension noted  -ST               User Key  (r) = Recorded By, (t) = Taken By, (c) = Cosigned By      Initials Name Provider Type    ST Modesta Cummings, PT Physical Therapist                   Goals/Plan       Row Name 07/16/24 1010          Bed Mobility Goal 1 (PT)    Activity/Assistive Device (Bed Mobility Goal 1, PT) bed mobility activities, all  -ST     [place] Level/Cues Needed (Bed Mobility Goal 1, PT) standby assist  -ST     Time Frame (Bed Mobility Goal 1, PT) 1 week  -ST     Progress/Outcomes (Bed Mobility Goal 1, PT) new goal  -ST       Row Name 07/16/24 1010          Transfer Goal 1 (PT)    Activity/Assistive Device (Transfer Goal 1, PT) sit-to-stand/stand-to-sit;bed-to-chair/chair-to-bed  -ST     [place] Level/Cues Needed (Transfer Goal 1, PT) supervision required  -ST     Time Frame (Transfer Goal 1, PT) 1 week  -ST     Progress/Outcome (Transfer Goal 1, PT) new goal  -ST       Row Name 07/16/24 1010          Gait Training Goal 1 (PT)    Activity/Assistive Device (Gait Training Goal 1, PT) gait (walking locomotion);assistive device use  -ST      [place] Level (Gait Training Goal 1, PT) standby assist  -ST     Distance (Gait Training Goal 1, PT) 50'  -ST     Time Frame (Gait Training Goal 1, PT) 1 week  -ST     Progress/Outcome (Gait Training Goal 1, PT) new goal  -ST               User Key  (r) = Recorded By, (t) = Taken By, (c) = Cosigned By      Initials Name Provider Type    ST Modesta Cummings, PT Physical Therapist                   Clinical Impression       Row Name 07/16/24 1010          Pain    Pretreatment Pain Rating 0/10 - no pain  -ST     Posttreatment Pain Rating 0/10 - no pain  -ST       Row Name 07/16/24 1010          Plan of Care Review    Plan of Care Reviewed With patient;family  -ST     Outcome Evaluation Pt is 88 y/o F admitted to [facility] on 7/15/24 with syncope. Pt with hx of dementia. At baseline pt is indep with rwx - lives with adult son, 2 [place]. Pt currently demos min A for bed mobility, min/mod A of 2 for transfers and standing balance. Did assess orthostatics. Supine: 128/75, standing 80/39 with increased assist to stand noted and pt less responsive. Return to supine: 138/84. MD present in room at the time. Pt demos mobility below baseline and therefore would benefit from acute skilled PT to address functional mobility deficits. Anticipate d/c home with assist and home health as needed.  -ST       Row Name 07/16/24 1010          Therapy Assessment/Plan (PT)    Rehab Potential (PT) good, to achieve stated therapy goals  -ST     Criteria for Skilled Interventions Met (PT) yes  -ST     Therapy Frequency (PT) 5 times/wk  -ST     Predicted Duration of Therapy Intervention (PT) 1 week  -ST       Row Name 07/16/24 1010          Positioning and Restraints    Pre-Treatment Position in bed  -ST     Post Treatment Position bed  -ST     In Bed notified nsg;supine;call light within reach;encouraged to call for assist;exit alarm on;with family/caregiver  -ST               User Key  (r) = Recorded By, (t) = Taken By, (c) = Cosigned By       Initials Name Provider Type    ST Modesta Cummings PT Physical Therapist                   Outcome Measures       Row Name 07/16/24 1010          How much help from another person do you currently need...    Turning from your back to your side while in flat bed without using bedrails? 3  -ST     Moving from lying on back to sitting on the side of a flat bed without bedrails? 3  -ST     Moving to and from a bed to a chair (including a wheelchair)? 2  -ST     Standing up from a chair using your arms (e.g., wheelchair, bedside chair)? 2  -ST     Climbing 3-5 steps with a railing? 2  -ST     To walk in hospital room? 2  -ST     AM-PAC 6 Clicks Score (PT) 14  -ST     Highest Level of Mobility Goal 4 --> Transfer to chair/commode  -ST               User Key  (r) = Recorded By, (t) = Taken By, (c) = Cosigned By      Initials Name Provider Type     Modesta Cummings PT Physical Therapist                                 Physical Therapy Education       Title: PT OT SLP Therapies (In Progress)       Topic: Physical Therapy (In Progress)       Point: Mobility training (In Progress)       Learning Progress Summary             Patient Acceptance, TB,E, NR by  at 7/16/2024 1010                         Point: Home exercise program (Not Started)       Learner Progress:  Not documented in this visit.              Point: Body mechanics (Not Started)       Learner Progress:  Not documented in this visit.              Point: Precautions (Not Started)       Learner Progress:  Not documented in this visit.                              User Key       Initials Effective Dates Name Provider Type Discipline     09/22/22 -  Modesta Cummings PT Physical Therapist PT                  PT Recommendation and Plan     Plan of Care Reviewed With: patient, family  Outcome Evaluation: Pt is 88 y/o F admitted to [facility] on 7/15/24 with syncope. Pt with hx of dementia. At baseline pt is indep with rwx - lives with adult son, 2 [place]. Pt  currently demos min A for bed mobility, min/mod A of 2 for transfers and standing balance. Did assess orthostatics. Supine: 128/75, standing 80/39 with increased assist to stand noted and pt less responsive. Return to supine: 138/84. MD present in room at the time. Pt demos mobility below baseline and therefore would benefit from acute skilled PT to address functional mobility deficits. Anticipate d/c home with assist and home health as needed.     Time Calculation:         PT Charges       Row Name 07/16/24 1013             Time Calculation    Start Time 0941  -ST      Stop Time 0959  -ST      Time Calculation (min) 18 min  -ST      PT Received On 07/16/24  -ST      PT - Next Appointment 07/17/24  -ST      PT Goal Re-Cert Due Date 07/23/24  -ST         Time Calculation- PT    Total Timed Code Minutes- PT 0 minute(s)  -ST                User Key  (r) = Recorded By, (t) = Taken By, (c) = Cosigned By      Initials Name Provider Type    ST Modesta Cummings, PT Physical Therapist                  Therapy Charges for Today       Code Description Service Date Service Provider Modifiers Qty    [id number] HC PT EVAL MOD COMPLEXITY 2 7/16/2024 Modesta Cummings, PT GP 1            PT G-Codes  AM-PAC 6 Clicks Score (PT): 14  PT Discharge Summary  Anticipated Discharge Disposition (PT): home with 24/7 care, home with home health    Modesta Cummings, PT  7/16/2024

## 2024-07-16 NOTE — H&P
HISTORY AND PHYSICAL   [facility]        Date of Admission: 7/15/2024  Patient Identification:  Name: Allison Fitzpatrick  Age: 89 y.o.  Sex: female  :  1934  MRN: [id number]                     Primary Care Physician: Haleigh Nicolas MD    Chief Complaint:  89 year old female was taken to the ED after she had a syncopal episode; her family was taking her to an eye appointment; she also had an episode of dizziness a week ago; she is very hard of hearing and has a history of dementia; she is not able to give any history; no visitors are present    History of Present Illness:   As above    Past Medical History:  Past Medical History:   Diagnosis Date    Acute ischemic stroke 2020    Arthritis     Arthritis     Bilateral hearing loss     Bladder problem     Dementia     Difficulty walking     Diverticulitis     Diverticulosis     Fracture of one rib of left side 3/28/2018    Hard of hearing     Headache, tension-type     History of colon polyps     Hypertension     Irregular heart beat     Macular degeneration     Memory loss     Sleep apnea     Syncope and collapse 3/28/2018     Past Surgical History:  Past Surgical History:   Procedure Laterality Date    ABDOMINAL SURGERY      CATARACT EXTRACTION Bilateral     COLONOSCOPY N/A 2016    ih, tics, hp polyp     COLONOSCOPY W/ POLYPECTOMY      ENDOSCOPY N/A 2016    nml     HYSTERECTOMY      TONSILLECTOMY        Home Meds:  Medications Prior to Admission   Medication Sig Dispense Refill Last Dose    aspirin 81 MG chewable tablet Chew 1 tablet Daily.   Past Week    famotidine (PEPCID) 40 MG tablet TAKE 1 TABLET BY MOUTH AT NIGHT  AS NEEDED FOR HEARTBURN 90 tablet 3     acetaminophen (TYLENOL) 650 MG 8 hr tablet Take 1 tablet by mouth Every 8 (Eight) Hours As Needed for Mild Pain.       amLODIPine (NORVASC) 5 MG tablet TAKE 1 TABLET BY MOUTH DAILY 90 tablet 3 Unknown    atorvastatin (LIPITOR) 40 MG tablet TAKE 1 TABLET BY MOUTH DAILY 90  tablet 3 Unknown    loperamide (IMODIUM) 2 MG capsule Take 1 capsule by mouth 4 (Four) Times a Day As Needed for Diarrhea.       Mirabegron ER (Myrbetriq) 25 MG tablet sustained-release 24 hour 24 hr tablet Take 1 tablet by mouth Daily. Urinary incontinence 90 tablet 1     neomycin-polymyxin-hydrocortisone (CORTISPORIN) 3.5-43875-2 otic solution Administer 3 drops into both ears 3 (Three) Times a Day. 10 mL 0     triamcinolone (KENALOG) 0.1 % ointment Apply 1 Application topically to the appropriate area as directed 2 (Two) Times a Day. ears 30 g 1        Allergies:  No Known Allergies  Immunizations:  Immunization History   Administered Date(s) Administered    COVID-19 (PFIZER) Purple Cap Monovalent 2021, 2021, 2021    Covid-19 (Pfizer) Gray Cap Monovalent 05/15/2022    Flu Vaccine Quad PF >36MO 10/21/2015    Fluad Quad 65+ 10/12/2020    Fluzone High Dose =>65 Years ([employer] ONLY) 2016, 10/22/2017, 09/15/2019    Fluzone High-Dose 65+yrs 2021, 10/23/2022, 2023    PPD Test 2020    Pneumococcal Conjugate 13-Valent (PCV13) 2018    Pneumococcal Conjugate 20-Valent (PCV20) 2023    Pneumococcal Polysaccharide (PPSV23) 2019    Shingrix 12/15/2022    Td (TDVAX) 2018     Social History:   Social History     Social History Narrative    Not on file     Social History     Socioeconomic History    Marital status:    Tobacco Use    Smoking status: Never    Smokeless tobacco: Never   Vaping Use    Vaping status: Never Used   Substance and Sexual Activity    Alcohol use: No    Drug use: No    Sexual activity: Defer       Family History:  Family History   Problem Relation Age of Onset    Colon cancer Mother     Arthritis Mother     Dementia Mother     Diabetes type II Sister     Diabetes type II Brother     Stomach cancer Brother     Coronary artery disease Father 60         MI in 60s    Breast cancer Neg Hx     Stroke Neg Hx         Review of Systems  Not  "obtainable from the patient       Objective:  T Max 24 hrs: Temp (24hrs), Av.2 °F (36.8 °C), Min:98.2 °F (36.8 °C), Max:98.2 °F (36.8 °C)    Vitals Ranges:   Temp:  [98.2 °F (36.8 °C)] 98.2 °F (36.8 °C)  Heart Rate:  [81-92] 84  Resp:  [16] 16  BP: (102-133)/(58-70) 102/58      Exam:  /58   Pulse 84   Temp 98.2 °F (36.8 °C) (Oral)   Resp 16   Ht 162.6 cm (64\")   Wt 49 kg (108 lb 0.4 oz)   LMP  (LMP Unknown)   SpO2 98%   BMI 18.54 kg/m²     General Appearance:    Alert, cooperative, no distress, appears stated age; thin, frail   Head:    Normocephalic, without obvious abnormality, atraumatic; bitemporal wasting   Eyes:    PERRL, conjunctivae/corneas clear, EOM's intact, both eyes   Ears:    Normal external ear canals, both ears   Nose:   Nares normal, septum midline, mucosa normal, no drainage    or sinus tenderness   Throat:   Lips, mucosa, and tongue normal   Neck:   Supple, symmetrical, trachea midline, no adenopathy;     thyroid:  no enlargement/tenderness/nodules; no carotid    bruit or JVD   Back:     Symmetric, no curvature, ROM normal, no CVA tenderness   Lungs:     Clear to auscultation bilaterally, respirations unlabored   Chest Wall:    No tenderness or deformity    Heart:    Regular rate and rhythm, S1 and S2 normal, no murmur, rub   or gallop   Abdomen:     Soft, nontender, bowel sounds active all four quadrants,     no masses, no hepatomegaly, no splenomegaly   Extremities:   Extremities normal, atraumatic, no cyanosis or edema                       .    Data Review:  Labs in chart were reviewed.  WBC   Date Value Ref Range Status   07/15/2024 7.03 3.40 - 10.80 10*3/mm3 Final     Hemoglobin   Date Value Ref Range Status   07/15/2024 11.8 (L) 12.0 - 15.9 g/dL Final     Hematocrit   Date Value Ref Range Status   07/15/2024 36.5 34.0 - 46.6 % Final     Platelets   Date Value Ref Range Status   07/15/2024 180 140 - 450 10*3/mm3 Final     Sodium   Date Value Ref Range Status   07/15/2024 " 143 136 - 145 mmol/L Final     Potassium   Date Value Ref Range Status   07/15/2024 4.5 3.5 - 5.2 mmol/L Final     Comment:     Slight hemolysis detected by analyzer. Result may be falsely elevated.     Chloride   Date Value Ref Range Status   07/15/2024 107 98 - 107 mmol/L Final     CO2   Date Value Ref Range Status   07/15/2024 23.3 22.0 - 29.0 mmol/L Final     BUN   Date Value Ref Range Status   07/15/2024 34 (H) 8 - 23 mg/dL Final     Creatinine   Date Value Ref Range Status   07/15/2024 1.50 (H) 0.57 - 1.00 mg/dL Final     Glucose   Date Value Ref Range Status   07/15/2024 163 (H) 65 - 99 mg/dL Final     Calcium   Date Value Ref Range Status   07/15/2024 9.1 8.6 - 10.5 mg/dL Final                Imaging Results (All)       Procedure Component Value Units Date/Time    XR Chest 1 View [[id number]] Collected: 07/15/24 1650     Updated: 07/15/24 1920    Narrative:      CHEST SINGLE VIEW  BOTH TIBIA/FIBULA: AP, LATERAL OF EACH     HISTORY: Syncope. Right leg bruising     COMPARISON: Portable upright chest 12/18/2023, 08/22/2020     FINDINGS:     CHEST: There is chronic elevation of the right hemidiaphragm. Heart size  is within normal limits. Thoracic aorta is tortuous and aortic vascular  calcifications are present. There are increased interstitial markings  that appear chronic and similar to the previous exam. Small calcified  nodules present the right lung base. There is also calcified nodule in  medial left lung base. There is no evidence for pulmonary edema or  infiltrate. Cardiac monitoring leads are noted. The bones are  osteopenic.     RIGHT TIBIA/FIBULA: There is no evidence for fracture or acute  abnormality of the right tibia/fibula. There is soft tissue swelling  anterior to the patella and the patellar tendon.     LEFT TIBIA/FIBULA: There is no evidence for fracture or acute osseous  abnormality of the left tibia or fibula. Soft tissues appear within  normal limits.       Impression:      1. Chronic  changes in the chest without evidence for acute disease.  2. Mild soft tissue swelling anterior to the right patella and the  patellar tendon.      This report was finalized on 7/15/2024 7:17 PM by Dr. Oziel Weiss M.D on Workstation: [id number]       XR Tibia Fibula 2 View Bilateral [[id number]] Collected: 07/15/24 1650     Updated: 07/15/24 1920    Narrative:      CHEST SINGLE VIEW  BOTH TIBIA/FIBULA: AP, LATERAL OF EACH     HISTORY: Syncope. Right leg bruising     COMPARISON: Portable upright chest 12/18/2023, 08/22/2020     FINDINGS:     CHEST: There is chronic elevation of the right hemidiaphragm. Heart size  is within normal limits. Thoracic aorta is tortuous and aortic vascular  calcifications are present. There are increased interstitial markings  that appear chronic and similar to the previous exam. Small calcified  nodules present the right lung base. There is also calcified nodule in  medial left lung base. There is no evidence for pulmonary edema or  infiltrate. Cardiac monitoring leads are noted. The bones are  osteopenic.     RIGHT TIBIA/FIBULA: There is no evidence for fracture or acute  abnormality of the right tibia/fibula. There is soft tissue swelling  anterior to the patella and the patellar tendon.     LEFT TIBIA/FIBULA: There is no evidence for fracture or acute osseous  abnormality of the left tibia or fibula. Soft tissues appear within  normal limits.       Impression:      1. Chronic changes in the chest without evidence for acute disease.  2. Mild soft tissue swelling anterior to the right patella and the  patellar tendon.      This report was finalized on 7/15/2024 7:17 PM by Dr. Oziel Weiss M.D on Workstation: [id number]       CT Head Without Contrast [[id number]] Collected: 07/15/24 1627     Updated: 07/15/24 1638    Narrative:      CT HEAD AND CERVICAL SPINE WITHOUT CONTRAST     HISTORY: Syncope, confusion, neck pain, fall.     COMPARISON: MRI brain 8/23/2020     CT HEAD  WITHOUT CONTRAST: The brain and ventricles are symmetrical.  Decreased attenuation involving the white matter of the cerebral  hemispheres is appreciated bilaterally consistent with moderate small  vessel ischemic disease. Moderate vascular calcification is noted. No  focal area of decreased attenuation to suggest acute infarction or  contusion is appreciated. Bone windows show no evidence of a calvarial  fracture.       Impression:      No acute process is identified. The etiology for the  patient's syncope and confusion is not established. Further evaluation  could be performed with a MRI examination of the brain as indicated.        CT CERVICAL SPINE WITHOUT CONTRAST: There is a grade 1 retrolisthesis of  C3 upon C4 which appears similar to 7/15/2024. There is moderate loss of  disc height from C5-T1, unchanged. There is no evidence of prevertebral  edema or of a cervical fracture. The facets are fused bilaterally at  C7/T1. There is partial visualization of an exophytic nodule involving  the left lobe of the thyroid gland. This measures at least 21 mm in AP  dimension.     C2-3: Small right paracentral disc osteophyte complex is present with  zones of herniation.     C3-4: A right paracentral disc osteophyte complex is present which abuts  and mildly flattens the ventral surface of the cord. Mild foraminal  stenosis is present on the right secondary to uncovertebral degenerative  disease.     C4-5: There is severe facet degenerative disease on the right.     C5-6: A far lateral disc osteophyte complex is present to the left which  abuts the anterolateral aspect the cord to the left.     C6-7: A far lateral disc osteophyte complex is present to the left which  abuts the anterolateral aspect of the cord to the left, slightly more  prominent as compared to C5-6.     C7-T1: There is no evidence of disc bulge or herniation.     IMPRESSION:   1.  There is no evidence of fracture. Multilevel degenerative  disease  involving the cervical spine is noted as described above including  multilevel disc osteophyte complexes (most prominent which is at C3 6/C7  and then C5/C6. There is fusion of the facets bilaterally at C7/T1.  2.  There is partial visualization of a nodule involving the left lobe  of the thyroid gland posteriorly measuring 21 mm in AP dimension.  Further evaluation with a thyroid ultrasound is recommended.           Radiation dose reduction techniques were utilized, including automated  exposure control and exposure modulation based on body size.        This report was finalized on 7/15/2024 4:35 PM by Dr. Sravan Nugent M.D  on Workstation: [id number]       CT Cervical Spine Without Contrast [[id number]] Collected: 07/15/24 1627     Updated: 07/15/24 1638    Narrative:      CT HEAD AND CERVICAL SPINE WITHOUT CONTRAST     HISTORY: Syncope, confusion, neck pain, fall.     COMPARISON: MRI brain 8/23/2020     CT HEAD WITHOUT CONTRAST: The brain and ventricles are symmetrical.  Decreased attenuation involving the white matter of the cerebral  hemispheres is appreciated bilaterally consistent with moderate small  vessel ischemic disease. Moderate vascular calcification is noted. No  focal area of decreased attenuation to suggest acute infarction or  contusion is appreciated. Bone windows show no evidence of a calvarial  fracture.       Impression:      No acute process is identified. The etiology for the  patient's syncope and confusion is not established. Further evaluation  could be performed with a MRI examination of the brain as indicated.        CT CERVICAL SPINE WITHOUT CONTRAST: There is a grade 1 retrolisthesis of  C3 upon C4 which appears similar to 7/15/2024. There is moderate loss of  disc height from C5-T1, unchanged. There is no evidence of prevertebral  edema or of a cervical fracture. The facets are fused bilaterally at  C7/T1. There is partial visualization of an exophytic nodule involving  the  left lobe of the thyroid gland. This measures at least 21 mm in AP  dimension.     C2-3: Small right paracentral disc osteophyte complex is present with  zones of herniation.     C3-4: A right paracentral disc osteophyte complex is present which abuts  and mildly flattens the ventral surface of the cord. Mild foraminal  stenosis is present on the right secondary to uncovertebral degenerative  disease.     C4-5: There is severe facet degenerative disease on the right.     C5-6: A far lateral disc osteophyte complex is present to the left which  abuts the anterolateral aspect the cord to the left.     C6-7: A far lateral disc osteophyte complex is present to the left which  abuts the anterolateral aspect of the cord to the left, slightly more  prominent as compared to C5-6.     C7-T1: There is no evidence of disc bulge or herniation.     IMPRESSION:   1.  There is no evidence of fracture. Multilevel degenerative disease  involving the cervical spine is noted as described above including  multilevel disc osteophyte complexes (most prominent which is at C3 6/C7  and then C5/C6. There is fusion of the facets bilaterally at C7/T1.  2.  There is partial visualization of a nodule involving the left lobe  of the thyroid gland posteriorly measuring 21 mm in AP dimension.  Further evaluation with a thyroid ultrasound is recommended.           Radiation dose reduction techniques were utilized, including automated  exposure control and exposure modulation based on body size.        This report was finalized on 7/15/2024 4:35 PM by Dr. Sravan Nugent M.D  on Workstation: [id number]                 Assessment:  Active Hospital Problems    Diagnosis  POA    **Syncope [R55]  Yes      Resolved Hospital Problems   No resolved problems to display.   Dizziness  Dementia  Hard of hearing  Hypertension  Acute kidney injury   Anemia  hyperglycemia    Plan:  Will monitor on telemetry  Fluids  Echo  Trend labs  Dw ed provider    Emily  Brown Irvin MD  7/15/2024  23:02 EDT

## 2024-07-16 NOTE — PROGRESS NOTES
Name: Allison Fitzpatrick ADMIT: 7/15/2024   : 1934  PCP: Haleigh Nicolas MD    MRN: [id number] LOS: 0 days   AGE/SEX: 89 y.o. female  ROOM: [facility]     Subjective   Subjective   Patient is extremely hard of hearing.  Family at bedside.  Patient having orthostatics done that were remarkably positive.  Patient put back in bed.  Patient required some medication overnight because of agitation.  But right now she is currently very pleasant and making jokes          Objective   Objective   Vital Signs  Temp:  [97.9 °F (36.6 °C)-98.2 °F (36.8 °C)] 98.2 °F (36.8 °C)  Heart Rate:  [80-92] 80  Resp:  [16] 16  BP: (102-146)/(58-79) 141/70  SpO2:  [95 %-98 %] 98 %  on   ;   Device (Oxygen Therapy): room air  Body mass index is 18.54 kg/m².  Physical Exam  Constitutional:       General: She is not in acute distress.     Appearance: She is not ill-appearing.      Comments: Elderly, frail   Cardiovascular:      Rate and Rhythm: Normal rate and regular rhythm.   Pulmonary:      Effort: Pulmonary effort is normal. No respiratory distress.   Abdominal:      General: Abdomen is flat. There is no distension.      Tenderness: There is no abdominal tenderness.   Musculoskeletal:         General: No swelling or deformity. Normal range of motion.   Skin:     General: Skin is warm and dry.   Neurological:      General: No focal deficit present.      Mental Status: She is alert. Mental status is at baseline.         Results Review     I reviewed the patient's new clinical results.  Results from last 7 days   Lab Units 24  0312 07/15/24  1526   WBC 10*3/mm3 6.65 7.03   HEMOGLOBIN g/dL 11.6* 11.8*   PLATELETS 10*3/mm3 171 180     Results from last 7 days   Lab Units 24  0312 07/15/24  1526   SODIUM mmol/L 144 143   POTASSIUM mmol/L 3.7 4.5   CHLORIDE mmol/L 109* 107   CO2 mmol/L 24.0 23.3   BUN mg/dL 29* 34*   CREATININE mg/dL 0.83 1.50*   GLUCOSE mg/dL 92 163*   Estimated Creatinine Clearance: 35.5 mL/min (by C-G  "formula based on SCr of 0.83 mg/dL).  Results from last 7 days   Lab Units 07/15/24  1526   ALBUMIN g/dL 4.2   BILIRUBIN mg/dL 0.6   ALK PHOS U/L 46   AST (SGOT) U/L 20   ALT (SGPT) U/L 14     Results from last 7 days   Lab Units 07/16/24  0312 07/15/24  1526   CALCIUM mg/dL 8.8 9.1   ALBUMIN g/dL  --  4.2       COVID19   Date Value Ref Range Status   08/28/2020 Not Detected Not Detected - Ref. Range Final   08/22/2020 Not Detected Not Detected - Ref. Range Final     No results found for: \"HGBA1C\", \"POCGLU\"    Duplex Carotid Ultrasound CAR    Right internal carotid artery demonstrates a less than 50% stenosis.    Left internal carotid artery demonstrates a less than 50% stenosis.    I reviewed the patient's daily medications.  Scheduled Medications  [Held by provider] aspirin, 81 mg, Oral, Daily  atorvastatin, 40 mg, Oral, Daily  cefTRIAXone, 1,000 mg, Intravenous, Q24H  QUEtiapine, 25 mg, Oral, Nightly    Infusions  sodium chloride, 100 mL/hr, Last Rate: 100 mL/hr (07/16/24 0900)    Diet  Diet: Cardiac; Healthy Heart (2-3 Na+); Fluid Consistency: Thin (IDDSI 0)         I have personally reviewed:  [x]  Laboratory   [x]  Microbiology   [x]  Radiology   []  EKG/Telemetry   []  Cardiology/Vascular   []  Pathology   [x]  Records     Assessment/Plan     Active Hospital Problems    Diagnosis  POA    **Orthostatic syncope [I95.1]  Yes    Syncope [R55]  Yes    Dementia [F03.90]  Yes    HARRY (acute kidney injury) [N17.9]  Yes    HLD (hyperlipidemia) [E78.5]  Yes    Gait instability [R26.81]  Yes      Resolved Hospital Problems   No resolved problems to display.       89 y.o. female admitted with Orthostatic syncope.    Orthostatic syncope  -Orthostatics this morning positive after fluids overnight.  Give additional bolus and continue IVF  -Check orthostatics every shift  -Stop amlodipine  -Echo pending, bilateral carotid duplex less than 50% stenosis  -Discussed with daughter the importance of making sure patient stays " hydrated    HARRY  -Creatinine 1.5 on admission, down to 0.83 today    Abnormal UA  -Treat with ceftriaxone until urine culture returns.  Patient is not a reliable historian and states she is having some discomfort with urination, but this sensation has been going on for months.    Hyperlipidemia-continue home statin    GERD-continue home Pepcid    Dementia  -Some agitation overnight.  Start Seroquel this evening    SCDs for DVT prophylaxis.  DNR.  Discussed with patient, family, and nursing staff.  Anticipate discharge home with family in 1-2 days. Pending blood pressures    Expected Discharge Date: 7/18/2024; Expected Discharge Time:       Kareem Wallace MD  [facility]  07/16/24  15:37 EDT

## 2024-07-16 NOTE — THERAPY EVALUATION
Patient Name: Allison Fitzpatrick  : 1934    MRN: [id number]                              Today's Date: 2024       Admit Date: 7/15/2024    Visit Dx:     ICD-10-CM ICD-9-CM   1. Syncope, unspecified syncope type  R55 780.2   2. Chronic renal impairment, unspecified CKD stage  N18.9 585.9   3. Thyroid nodule  E04.1 241.0   4. Chronic anemia  D64.9 285.9     Patient Active Problem List   Diagnosis    Diverticulitis of large intestine    Near syncope    Essential hypertension    Bilateral hearing loss    Gait instability    Cholesteatoma of attic of left ear    HLD (hyperlipidemia)    Chest pain    Community acquired pneumonia of left lung    HARRY (acute kidney injury)    Acute ischemic stroke    Cerebrovascular disease    Medicare annual wellness visit, subsequent    Fatigue    Chest wall tenderness    Difficulty in walking, not elsewhere classified    Dementia    Syncope     Past Medical History:   Diagnosis Date    Acute ischemic stroke 2020    Arthritis     Arthritis     Bilateral hearing loss     Bladder problem     Dementia     Difficulty walking     Diverticulitis     Diverticulosis     Fracture of one rib of left side 3/28/2018    Hard of hearing     Headache, tension-type     History of colon polyps     Hypertension     Irregular heart beat     Macular degeneration     Memory loss     Sleep apnea     Syncope and collapse 3/28/2018     Past Surgical History:   Procedure Laterality Date    ABDOMINAL SURGERY      CATARACT EXTRACTION Bilateral     COLONOSCOPY N/A 2016    ih, tics, hp polyp     COLONOSCOPY W/ POLYPECTOMY      ENDOSCOPY N/A 2016    nml     HYSTERECTOMY      TONSILLECTOMY        General Information       Row Name 24 1048          OT Time and Intention    Document Type evaluation  -RB     Mode of Treatment occupational therapy;co-treatment;physical therapy  low pt activity tolerance  -RB       Row Name 24 1048          General Information    Existing  Precautions/Restrictions fall;orthostatic hypotension  -RB     Barriers to Rehab cognitive status;hearing deficit  -RB       Row Name 07/16/24 1048          Living Environment    People in Home child(mila), adult  -RB       Row Name 07/16/24 1048          Home Main Entrance    Number of Stairs, Main Entrance two  -RB     Stair Railings, Main Entrance railings safe and in good condition  -RB       Row Name 07/16/24 1048          Cognition    Orientation Status (Cognition) oriented to;person;verbal cues/prompts needed for orientation;other (see comments)  pleasantly confused  -RB       Row Name 07/16/24 1048          Safety Issues, Functional Mobility    Safety Issues Affecting Function (Mobility) insight into deficits/self-awareness;awareness of need for assistance;safety precaution awareness;safety precautions follow-through/compliance  -RB     Impairments Affecting Function (Mobility) balance;cognition;endurance/activity tolerance;strength;postural/trunk control;pain  -RB               User Key  (r) = Recorded By, (t) = Taken By, (c) = Cosigned By      Initials Name Provider Type    RB Cadence Gar OT Occupational Therapist                     Mobility/ADL's       Row Name 07/16/24 1049          Bed Mobility    Bed Mobility supine-sit;sit-supine  -RB     Supine-Sit [place] (Bed Mobility) minimum assist (75% patient effort);1 person assist;verbal cues  -RB     Sit-Supine [place] (Bed Mobility) moderate assist (50% patient effort);2 person assist;verbal cues  -RB     Assistive Device (Bed Mobility) bed rails  -RB     Comment, (Bed Mobility) increased assist provided for sit-> supine due to the drop in her pressure  -RB       Row Name 07/16/24 1049          Transfers    Transfers sit-stand transfer;stand-sit transfer  -RB     Comment, (Transfers) stood twice to obtain BP  -RB       Row Name 07/16/24 1049          Sit-Stand Transfer    Sit-Stand [place] (Transfers) moderate assist (50% patient  effort);2 person assist;verbal cues  -RB     Comment, (Sit-Stand Transfer) hha x 2, very weak  -RB       [facility] Name 07/16/24 1049          Stand-Sit Transfer    Stand-Sit Delta (Transfers) moderate assist (50% patient effort);2 person assist;verbal cues  -RB     Comment, (Stand-Sit Transfer) hha x 2  -RB       [facility] Name 07/16/24 1049          Functional Mobility    Functional Mobility- Ind. Level not tested;unable to perform  -RB       [facility] Name 07/16/24 1049          Activities of Daily Living    BADL Assessment/Intervention lower body dressing  -RB       Row Name 07/16/24 1049          Lower Body Dressing Assessment/Training    Delta Level (Lower Body Dressing) lower body dressing skills;dependent (less than 25% patient effort)  -RB     Position (Lower Body Dressing) supine  -RB               User Key  (r) = Recorded By, (t) = Taken By, (c) = Cosigned By      Initials Name Provider Type    RB Cadence Gar OT Occupational Therapist                   Obj/Interventions       [facility] Name 07/16/24 1051          Sensory Assessment (Somatosensory)    Sensory Assessment (Somatosensory) sensation intact  -RB       Row Name 07/16/24 1051          Vision Assessment/Intervention    Visual Impairment/Limitations WFL  -RB     Vision Assessment Comment + nystagmus when BP dropped standing and she returned to bed  -RB       [facility] Name 07/16/24 1051          Range of Motion Comprehensive    General Range of Motion no range of motion deficits identified  -RB       Row Name 07/16/24 1051          Strength Comprehensive (MMT)    Comment, General Manual Muscle Testing (MMT) Assessment generalized weakness  -RB       Row Name 07/16/24 1051          Motor Skills    Motor Skills functional endurance  -RB     Functional Endurance poor - pt stood with Mod A x2 and max encouragement for ~3-5 min max  -RB       [facility] Name 07/16/24 1051          Balance    Comment, Balance CGA sitting balance, Mod-Max A for standing in place when BP  dropped  -RB               User Key  (r) = Recorded By, (t) = Taken By, (c) = Cosigned By      Initials Name Provider Type    RB Cadence Gar, OT Occupational Therapist                   Goals/Plan       Row Name 07/16/24 1053          Bed Mobility Goal 1 (OT)    Activity/Assistive Device (Bed Mobility Goal 1, OT) bed mobility activities, all  -RB     [place]ison Level/Cues Needed (Bed Mobility Goal 1, OT) standby assist  -RB     Time Frame (Bed Mobility Goal 1, OT) short term goal (STG);2 weeks  -RB     Progress/Outcomes (Bed Mobility Goal 1, OT) new goal  -RB       Row Name 07/16/24 1053          Transfer Goal 1 (OT)    Activity/Assistive Device (Transfer Goal 1, OT) transfers, all  -RB     [place] Level/Cues Needed (Transfer Goal 1, OT) standby assist  -RB     Time Frame (Transfer Goal 1, OT) short term goal (STG);2 weeks  -RB     Progress/Outcome (Transfer Goal 1, OT) new goal  -RB       Row Name 07/16/24 1053          Bathing Goal 1 (OT)    Activity/Device (Bathing Goal 1, OT) bathing skills, all  -RB     [place]ison Level/Cues Needed (Bathing Goal 1, OT) minimum assist (75% or more patient effort)  -RB     Time Frame (Bathing Goal 1, OT) short term goal (STG);2 weeks  -RB     Progress/Outcomes (Bathing Goal 1, OT) new goal  -RB       Row Name 07/16/24 1053          Dressing Goal 1 (OT)    Activity/Device (Dressing Goal 1, OT) dressing skills, all  -RB     [place]ison/Cues Needed (Dressing Goal 1, OT) minimum assist (75% or more patient effort)  -RB     Time Frame (Dressing Goal 1, OT) short term goal (STG);2 weeks  -RB     Progress/Outcome (Dressing Goal 1, OT) new goal  -RB       Row Name 07/16/24 1053          Toileting Goal 1 (OT)    Activity/Device (Toileting Goal 1, OT) toileting skills, all  -RB     [place]ison Level/Cues Needed (Toileting Goal 1, OT) minimum assist (75% or more patient effort)  -RB     Time Frame (Toileting Goal 1, OT) short term goal (STG);2 weeks  -RB      Progress/Outcome (Toileting Goal 1, OT) new goal  -RB       Row Name 07/16/24 1053          Grooming Goal 1 (OT)    Activity/Device (Grooming Goal 1, OT) grooming skills, all  -RB     [place] (Grooming Goal 1, OT) standby assist  -RB     Time Frame (Grooming Goal 1, OT) short term goal (STG);2 weeks  -RB     Progress/Outcome (Grooming Goal 1, OT) new goal  -RB       Row Name 07/16/24 1053          Therapy Assessment/Plan (OT)    Planned Therapy Interventions (OT) transfer/mobility retraining;strengthening exercise;ROM/therapeutic exercise;activity tolerance training;adaptive equipment training;BADL retraining;functional balance retraining;occupation/activity based interventions;patient/caregiver education/training;cognitive/visual perception retraining  -RB               User Key  (r) = Recorded By, (t) = Taken By, (c) = Cosigned By      Initials Name Provider Type    RB Cadence Gar, OT Occupational Therapist                   Clinical Impression       Row Name 07/16/24 1052          Pain Assessment    Pretreatment Pain Rating 0/10 - no pain  -RB     Posttreatment Pain Rating 0/10 - no pain  -RB       Row Name 07/16/24 1052          Plan of Care Review    Plan of Care Reviewed With patient;family  -RB     Progress improving  -RB     Outcome Evaluation The pt was admitted to [facility] 2/2 to stop walking and slumped down at the eye doctor. Pmhx significant for diverticulitis, hypertension, irregular heartbeat, syncope, dementia. The pt is very [place]. The pt lives with her son and is independent at baseline using a walker. Today, the pt completed bed mobility with Min A. Orthostatics completed.  Supine: 128/75  Standing >3 min: 80/39  Supine post stand: 138/84.   When standing the pt required Mod/Max A for standing balance due to her drop in pressure. At this time she requires assist for all ADL's and transfers due to weakness.   SNF recommended pending progress.  -RB       Row Name 07/16/24 1052          Therapy  Assessment/Plan (OT)    Rehab Potential (OT) good, to achieve stated therapy goals  -RB     Criteria for Skilled Therapeutic Interventions Met (OT) yes;skilled treatment is necessary  -RB     Therapy Frequency (OT) 5 times/wk  -RB       Row Name 07/16/24 1052          Therapy Plan Review/Discharge Plan (OT)    Anticipated Discharge Disposition (OT) skilled nursing facility  -RB       Row Name 07/16/24 1052          Positioning and Restraints    Pre-Treatment Position in bed  -RB     Post Treatment Position bed  -RB     In Bed notified nsg;supine;call light within reach;encouraged to call for assist;exit alarm on;fowlers;with family/caregiver  -RB               User Key  (r) = Recorded By, (t) = Taken By, (c) = Cosigned By      Initials Name Provider Type    RB Cadence Gar, ANDERSON Occupational Therapist                   Outcome Measures       Row Name 07/16/24 1054          How much help from another is currently needed...    Putting on and taking off regular lower body clothing? 1  -RB     Bathing (including washing, rinsing, and drying) 2  -RB     Toileting (which includes using toilet bed pan or urinal) 1  -RB     Putting on and taking off regular upper body clothing 2  -RB     Taking care of personal grooming (such as brushing teeth) 2  -RB     Eating meals 2  -RB     AM-PAC 6 Clicks Score (OT) 10  -RB       Row Name 07/16/24 1010          How much help from another person do you currently need...    Turning from your back to your side while in flat bed without using bedrails? 3  -ST     Moving from lying on back to sitting on the side of a flat bed without bedrails? 3  -ST     Moving to and from a bed to a chair (including a wheelchair)? 2  -ST     Standing up from a chair using your arms (e.g., wheelchair, bedside chair)? 2  -ST     Climbing 3-5 steps with a railing? 2  -ST     To walk in hospital room? 2  -ST     AM-PAC 6 Clicks Score (PT) 14  -ST     Highest Level of Mobility Goal 4 --> Transfer to  chair/commode  -ST       Row Name 07/16/24 1054          Modified Lorain Scale    Modified [place]ain Scale 4 - Moderately severe disability.  Unable to walk without assistance, and unable to attend to own bodily needs without assistance.  -RB       Row Name 07/16/24 1054          Functional Assessment    Outcome Measure Options AM-PAC 6 Clicks Daily Activity (OT);Modified [place]  -RB               User Key  (r) = Recorded By, (t) = Taken By, (c) = Cosigned By      Initials Name Provider Type    RB Cadence Gar, OT Occupational Therapist    Modesta Armas, PT Physical Therapist                    Occupational Therapy Education       Title: PT OT SLP Therapies (In Progress)       Topic: Occupational Therapy (Done)       Point: ADL training (Done)       Description:   Instruct learner(s) on proper safety adaptation and remediation techniques during self care or transfers.   Instruct in proper use of assistive devices.                  Learning Progress Summary             Patient Acceptance, TB,E,D, VU,DU by RB at 7/16/2024 1054                         Point: Home exercise program (Done)       Description:   Instruct learner(s) on appropriate technique for monitoring, assisting and/or progressing therapeutic exercises/activities.                  Learning Progress Summary             Patient Acceptance, TB,E,D, VU,DU by RB at 7/16/2024 1054                         Point: Precautions (Done)       Description:   Instruct learner(s) on prescribed precautions during self-care and functional transfers.                  Learning Progress Summary             Patient Acceptance, TB,E,D, VU,DU by RB at 7/16/2024 1054                         Point: Body mechanics (Done)       Description:   Instruct learner(s) on proper positioning and spine alignment during self-care, functional mobility activities and/or exercises.                  Learning Progress Summary             Patient Acceptance, TB,E,D, VU,DU by RB at  7/16/2024 1054                                         User Key       Initials Effective Dates Name Provider Type Discipline     06/16/21 -  Cadence Gar OT Occupational Therapist OT                  OT Recommendation and Plan  Planned Therapy Interventions (OT): transfer/mobility retraining, strengthening exercise, ROM/therapeutic exercise, activity tolerance training, adaptive equipment training, BADL retraining, functional balance retraining, occupation/activity based interventions, patient/caregiver education/training, cognitive/visual perception retraining  Therapy Frequency (OT): 5 times/wk  Plan of Care Review  Plan of Care Reviewed With: patient, family  Progress: improving  Outcome Evaluation: The pt was admitted to [facility] 2/2 to stop walking and slumped down at the eye doctor. Pmhx significant for diverticulitis, hypertension, irregular heartbeat, syncope, dementia. The pt is very [place]. The pt lives with her son and is independent at baseline using a walker. Today, the pt completed bed mobility with Min A. Orthostatics completed.  Supine: 128/75  Standing >3 min: 80/39  Supine post stand: 138/84.   When standing the pt required Mod/Max A for standing balance due to her drop in pressure. At this time she requires assist for all ADL's and transfers due to weakness.   SNF recommended pending progress.     Time Calculation:   Evaluation Complexity (OT)  Review Occupational Profile/Medical/Therapy History Complexity: expanded/moderate complexity  Assessment, Occupational Performance/Identification of Deficit Complexity: 3-5 performance deficits  Clinical Decision Making Complexity (OT): detailed assessment/moderate complexity  Overall Complexity of Evaluation (OT): moderate complexity     Time Calculation- OT       Row Name 07/16/24 1048             Time Calculation- OT    OT Start Time 0957  -RB      OT Stop Time 1025  -RB      OT Time Calculation (min) 28 min  -RB      Total Timed Code Minutes- OT 10  minute(s)  -RB      OT Received On 07/16/24  -RB      OT - Next Appointment 07/17/24  -RB      OT Goal Re-Cert Due Date 07/30/24  -RB         Timed Charges    67097 - OT Therapeutic Activity Minutes 10  -RB         Untimed Charges    OT Eval/Re-eval Minutes 18  -RB         Total Minutes    Timed Charges Total Minutes 10  -RB      Untimed Charges Total Minutes 18  -RB       Total Minutes 28  -RB                User Key  (r) = Recorded By, (t) = Taken By, (c) = Cosigned By      Initials Name Provider Type    Cadence Davis OT Occupational Therapist                  Therapy Charges for Today       Code Description Service Date Service Provider Modifiers Qty    [id number] HC OT EVAL MOD COMPLEXITY 3 7/16/2024 Cadence Gar OT GO 1                 Cadence Gar OT  7/16/2024

## 2024-07-16 NOTE — CASE MANAGEMENT/SOCIAL WORK
Discharge Planning Assessment  [facility]     Patient Name: Allison Fitzpatrick  MRN: [id number]  Today's Date: 7/16/2024    Admit Date: 7/15/2024    Plan: [facility] prior to returning home. Referral pending. Would need 3 midnight qualifying stay per Medicare for SNF.   Discharge Needs Assessment       Row Name 07/16/24 1753       Living Environment    People in Home child(mila), adult    Name(s) of People in Home Lives with her son but is alone while he is at work    Current Living Arrangements home    Potentially Unsafe Housing Conditions none    In the past 12 months has the electric, gas, oil, or water company threatened to shut off services in your home? No    Primary Care Provided by self;child(mila)    Provides Primary Care For no one, unable/limited ability to care for self    Family Caregiver if Needed child(mila), adult    Quality of Family Relationships involved;supportive    Able to Return to Prior Arrangements yes       Resource/Environmental Concerns    Resource/Environmental Concerns none    Transportation Concerns none       Transportation Needs    In the past 12 months, has lack of transportation kept you from medical appointments or from getting medications? no    In the past 12 months, has lack of transportation kept you from meetings, work, or from getting things needed for daily living? No       Food Insecurity    Within the past 12 months, you worried that your food would run out before you got the money to buy more. Never true    Within the past 12 months, the food you bought just didn't last and you didn't have money to get more. Never true       Transition Planning    Patient/Family Anticipates Transition to inpatient rehabilitation facility    Patient/Family Anticipated Services at Transition skilled nursing;home health care    Transportation Anticipated family or friend will provide       Discharge Needs Assessment    Readmission Within the Last 30 Days no previous admission in last 30  days    Equipment Currently Used at Home walker, rolling    Concerns to be Addressed care coordination/care conferences;discharge planning    Anticipated Changes Related to Illness inability to care for self    Equipment Needed After Discharge none    Discharge Facility/Level of Care Needs nursing facility, skilled;home with home health    Provided Post Acute Provider List? Refused    Refused Provider List Comment Daughter states she would like for her to go back to [facility] for SNF    Provided Post Acute Provider Quality & Resource List? Refused    Current Discharge Risk cognitively impaired;chronically ill;lack of support system/caregiver                   Discharge Plan       Row Name 07/16/24 1611       Plan    Plan [facility] prior to returning home. Referral pending. Would need 3 midnight qualifying stay per Medicare for SNF.    Patient/Family in Agreement with Plan yes    Plan Comments Met with pt. and daughter at bedside. Pt is [place]. Explained roll of . Face sheet and pharmacy verified. Pt lives with her son but is alone while he is at work.  There are 2 steps with hand rails to enter home. Home DME includes a walker.  Pt was independent with ADLs and ambulating with walker independently. Pt has been to [facility] and used [facility]. Discussed D/C plan. Daughter would like pt to go to [facility] for SNF prior to returning home. Referral for [facility] placed in [facility] and pending.  Pt's PCP is Haleigh Nicolas MD. Uses [facility] on [address] in [facility]. Pt's daughter drives her to appointments. Explained that CCP would follow to assess for discharge needs.  Lan Moncada RN-BC                  Continued Care and Services - Admitted Since 7/15/2024       Destination       Service Provider Request Status Selected Services Address Phone Fax Patient Preferred    [facility] Accepted N/A [address] [facility] [id number] [phone] --                   Expected Discharge Date and Time       Expected Discharge Date Expected Discharge Time    Jul 18, 2024            Demographic Summary       Row Name 07/16/24 1752       General Information    Admission Type inpatient    Arrived From emergency department    Required Notices Provided Important Message from Medicare    Reason for Consult care coordination/care conference;discharge planning    Preferred Language English                   Functional Status       Row Name 07/16/24 1752       Functional Status    Usual Activity Tolerance moderate    Current Activity Tolerance fair       Functional Status, IADL    Medications assistive equipment and person    Meal Preparation assistive equipment and person    Housekeeping assistive equipment and person    Laundry completely dependent    Shopping completely dependent       Mental Status    General Appearance WDL WDL       Mental Status Summary    Recent Changes in Mental Status/Cognitive Functioning no changes       Employment/    Employment Status retired                 Lan Moncada, RN

## 2024-07-16 NOTE — CASE MANAGEMENT/SOCIAL WORK
Continued Stay Note  [facility]     Patient Name: Allison Fitzpatrick  MRN: [id number]  Today's Date: 7/16/2024    Admit Date: 7/15/2024    Plan: [facility] prior to returning home. Referral pending. Would need 3 midnight qualifying stay per Medicare for SNF.   Discharge Plan       Row Name 07/16/24 1802       Plan    Plan [facility] prior to returning home. Referral pending. Would need 3 midnight qualifying stay per Medicare for SNF.    Patient/Family in Agreement with Plan yes    Plan Comments Met with pt. and daughter at bedside. Pt is [facility]. Explained roll of . Face sheet and pharmacy verified. Pt lives with her son but is alone while he is at work.  There are 2 steps with hand rails to enter home. Home DME includes a walker.  Pt was independent with ADLs and ambulating with walker independently. Pt has been to [facility] and used [facility]. Discussed D/C plan. Daughter would like pt to go to [facility] for SNF prior to returning home. Referral for [facility] placed in [facility] and pending.  Pt's PCP is Haleigh Nicolas MD. Uses [facility] on [address] [facility] in [facility]. Pt's daughter drives her to appointments. Explained that CCP would follow to assess for discharge needs.  Lan Moncada RN-BC                   Discharge Codes    No documentation.                 Expected Discharge Date and Time       Expected Discharge Date Expected Discharge Time    Jul 18, 2024               Lan Moncada RN

## 2024-07-17 ENCOUNTER — APPOINTMENT (OUTPATIENT)
Dept: CARDIOLOGY | Facility: HOSPITAL | Age: 89
DRG: 312 | End: 2024-07-17
Payer: MEDICARE

## 2024-07-17 LAB
BACTERIA SPEC AEROBE CULT: NORMAL
BH CV ECHO MEAS - ACS: 1.73 CM
BH CV ECHO MEAS - AO MAX PG: 7.5 MMHG
BH CV ECHO MEAS - AO MEAN PG: 3.4 MMHG
BH CV ECHO MEAS - AO ROOT DIAM: 3 CM
BH CV ECHO MEAS - AO V2 MAX: 137.2 CM/SEC
BH CV ECHO MEAS - AO V2 VTI: 25.4 CM
BH CV ECHO MEAS - AVA(I,D): 1.93 CM2
BH CV ECHO MEAS - EDV(MOD-SP2): 61 ML
BH CV ECHO MEAS - EDV(MOD-SP4): 75 ML
BH CV ECHO MEAS - EF(MOD-BP): 52 %
BH CV ECHO MEAS - EF(MOD-SP2): 54.1 %
BH CV ECHO MEAS - EF(MOD-SP4): 56 %
BH CV ECHO MEAS - ESV(MOD-SP2): 28 ML
BH CV ECHO MEAS - ESV(MOD-SP4): 33 ML
BH CV ECHO MEAS - LAT PEAK E' VEL: 9.2 CM/SEC
BH CV ECHO MEAS - LV DIASTOLIC VOL/BSA (35-75): 49.4 CM2
BH CV ECHO MEAS - LV MAX PG: 3.1 MMHG
BH CV ECHO MEAS - LV MEAN PG: 1.46 MMHG
BH CV ECHO MEAS - LV SYSTOLIC VOL/BSA (12-30): 21.8 CM2
BH CV ECHO MEAS - LV V1 MAX: 87.5 CM/SEC
BH CV ECHO MEAS - LV V1 VTI: 15.6 CM
BH CV ECHO MEAS - LVOT AREA: 3.1 CM2
BH CV ECHO MEAS - LVOT DIAM: 2 CM
BH CV ECHO MEAS - MED PEAK E' VEL: 5.5 CM/SEC
BH CV ECHO MEAS - MV DEC SLOPE: 1610 CM/SEC2
BH CV ECHO MEAS - MV E MAX VEL: 94.1 CM/SEC
BH CV ECHO MEAS - MV MAX PG: 3.8 MMHG
BH CV ECHO MEAS - MV MEAN PG: 1.52 MMHG
BH CV ECHO MEAS - MV P1/2T: 18.2 MSEC
BH CV ECHO MEAS - MV V2 VTI: 13.9 CM
BH CV ECHO MEAS - MVA(P1/2T): 12.1 CM2
BH CV ECHO MEAS - MVA(VTI): 3.5 CM2
BH CV ECHO MEAS - PA ACC TIME: 0.16 SEC
BH CV ECHO MEAS - PA V2 MAX: 103.6 CM/SEC
BH CV ECHO MEAS - QP/QS: 1.15
BH CV ECHO MEAS - RV MAX PG: 2.16 MMHG
BH CV ECHO MEAS - RV V1 MAX: 73.5 CM/SEC
BH CV ECHO MEAS - RV V1 VTI: 15.8 CM
BH CV ECHO MEAS - RVOT DIAM: 2.13 CM
BH CV ECHO MEAS - RVSP: 28 MMHG
BH CV ECHO MEAS - SV(LVOT): 49 ML
BH CV ECHO MEAS - SV(MOD-SP2): 33 ML
BH CV ECHO MEAS - SV(MOD-SP4): 42 ML
BH CV ECHO MEAS - SV(RVOT): 56.2 ML
BH CV ECHO MEAS - SVI(LVOT): 32.3 ML/M2
BH CV ECHO MEAS - SVI(MOD-SP2): 21.8 ML/M2
BH CV ECHO MEAS - SVI(MOD-SP4): 27.7 ML/M2
BH CV ECHO MEAS - TAPSE (>1.6): 1.4 CM
BH CV ECHO MEAS - TR MAX PG: 23.6 MMHG
BH CV ECHO MEAS - TR MAX VEL: 242.8 CM/SEC
BH CV ECHO MEASUREMENTS AVERAGE E/E' RATIO: 12.8
BH CV XLRA - RV BASE: 2.6 CM
BH CV XLRA - RV LENGTH: 5.6 CM
BH CV XLRA - RV MID: 2.33 CM
BH CV XLRA - TDI S': 6.9 CM/SEC
SINUS: 2.36 CM
STJ: 2.39 CM

## 2024-07-17 PROCEDURE — 93356 MYOCRD STRAIN IMG SPCKL TRCK: CPT

## 2024-07-17 PROCEDURE — 93306 TTE W/DOPPLER COMPLETE: CPT

## 2024-07-17 PROCEDURE — 25510000001 PERFLUTREN (DEFINITY) 8.476 MG IN SODIUM CHLORIDE (PF) 0.9 % 10 ML INJECTION: Performed by: INTERNAL MEDICINE

## 2024-07-17 PROCEDURE — 90791 PSYCH DIAGNOSTIC EVALUATION: CPT | Performed by: SOCIAL WORKER

## 2024-07-17 PROCEDURE — 97530 THERAPEUTIC ACTIVITIES: CPT

## 2024-07-17 PROCEDURE — 25010000002 CEFTRIAXONE PER 250 MG: Performed by: STUDENT IN AN ORGANIZED HEALTH CARE EDUCATION/TRAINING PROGRAM

## 2024-07-17 PROCEDURE — 93306 TTE W/DOPPLER COMPLETE: CPT | Performed by: INTERNAL MEDICINE

## 2024-07-17 PROCEDURE — 93356 MYOCRD STRAIN IMG SPCKL TRCK: CPT | Performed by: INTERNAL MEDICINE

## 2024-07-17 RX ADMIN — QUETIAPINE FUMARATE 25 MG: 25 TABLET ORAL at 20:57

## 2024-07-17 RX ADMIN — ATORVASTATIN CALCIUM 40 MG: 20 TABLET, FILM COATED ORAL at 09:10

## 2024-07-17 RX ADMIN — SODIUM CHLORIDE 100 ML/HR: 9 INJECTION, SOLUTION INTRAVENOUS at 15:32

## 2024-07-17 RX ADMIN — CEFTRIAXONE SODIUM 1000 MG: 1 INJECTION, POWDER, FOR SOLUTION INTRAMUSCULAR; INTRAVENOUS at 11:47

## 2024-07-17 RX ADMIN — PERFLUTREN 3 ML: 6.52 INJECTION, SUSPENSION INTRAVENOUS at 08:32

## 2024-07-17 NOTE — THERAPY TREATMENT NOTE
Patient Name: Allison Fitzpatrick  : 1934    MRN: [id number]                              Today's Date: 2024       Admit Date: 7/15/2024    Visit Dx:     ICD-10-CM ICD-9-CM   1. Syncope, unspecified syncope type  R55 780.2   2. Chronic renal impairment, unspecified CKD stage  N18.9 585.9   3. Thyroid nodule  E04.1 241.0   4. Chronic anemia  D64.9 285.9     Patient Active Problem List   Diagnosis    Diverticulitis of large intestine    Near syncope    Essential hypertension    Bilateral hearing loss    Gait instability    Cholesteatoma of attic of left ear    HLD (hyperlipidemia)    Chest pain    Community acquired pneumonia of left lung    HARRY (acute kidney injury)    Acute ischemic stroke    Cerebrovascular disease    Medicare annual wellness visit, subsequent    Fatigue    Chest wall tenderness    Difficulty in walking, not elsewhere classified    Dementia    Syncope    Orthostatic syncope     Past Medical History:   Diagnosis Date    Acute ischemic stroke 2020    Arthritis     Arthritis     Bilateral hearing loss     Bladder problem     Dementia     Difficulty walking     Diverticulitis     Diverticulosis     Fracture of one rib of left side 3/28/2018    Hard of hearing     Headache, tension-type     History of colon polyps     Hypertension     Irregular heart beat     Macular degeneration     Memory loss     Sleep apnea     Syncope and collapse 3/28/2018     Past Surgical History:   Procedure Laterality Date    ABDOMINAL SURGERY      CATARACT EXTRACTION Bilateral     COLONOSCOPY N/A 2016    ih, tics, hp polyp     COLONOSCOPY W/ POLYPECTOMY      ENDOSCOPY N/A 2016    nml     HYSTERECTOMY      TONSILLECTOMY        General Information       Row Name 24 1521          Physical Therapy Time and Intention    Document Type therapy note (daily note)  -ST     Mode of Treatment physical therapy  -ST       Row Name 24 1521          General Information    Patient Profile Reviewed yes  -ST      Existing Precautions/Restrictions fall;orthostatic hypotension  -       Row Name 07/17/24 1521          Cognition    Orientation Status (Cognition) oriented to;person;verbal cues/prompts needed for orientation  -[facility] Name 07/17/24 1521          Safety Issues, Functional Mobility    Impairments Affecting Function (Mobility) balance;cognition;endurance/activity tolerance;strength;postural/trunk control;pain  -     Comment, Safety Issues/Impairments (Mobility) gait belt, nonskid socks donned  -               User Key  (r) = Recorded By, (t) = Taken By, (c) = Cosigned By      Initials Name Provider Type    Modesta Armas PT Physical Therapist                   Mobility       Row Name 07/17/24 1522          Bed Mobility    Bed Mobility supine-sit  -ST     Supine-Sit Isabella (Bed Mobility) contact guard  -     Assistive Device (Bed Mobility) bed rails;head of bed elevated  -[facility] Name 07/17/24 1522          Sit-Stand Transfer    Sit-Stand Isabella (Transfers) minimum assist (75% patient effort)  -     Assistive Device (Sit-Stand Transfers) walker, front-wheeled  -       Row Name 07/17/24 1522          Gait/Stairs (Locomotion)    Isabella Level (Gait) verbal cues;contact guard;minimum assist (75% patient effort)  -     Assistive Device (Gait) walker, front-wheeled  -     Distance in Feet (Gait) 2  -ST     Deviations/Abnormal Patterns (Gait) gait speed decreased;stride length decreased;alisa decreased  -ST     Bilateral Gait Deviations forward flexed posture;heel strike decreased  -ST     Comment, (Gait/Stairs) take a few steps to reach recliner in room  -               User Key  (r) = Recorded By, (t) = Taken By, (c) = Cosigned By      Initials Name Provider Type    Modesta Armas PT Physical Therapist                   Obj/Interventions       Row Name 07/17/24 1523          Balance    Comment, Balance SBA for siting balance CGA to min A for prolonged  static standing to assess orthostatics today  -ST               User Key  (r) = Recorded By, (t) = Taken By, (c) = Cosigned By      Initials Name Provider Type    Modesta Armas PT Physical Therapist                   Goals/Plan    No documentation.                  Clinical Impression       Row Name 07/17/24 1523          Pain    Pretreatment Pain Rating 0/10 - no pain  -ST     Posttreatment Pain Rating 0/10 - no pain  -ST       Row Name 07/17/24 1523          Plan of Care Review    Plan of Care Reviewed With patient  -ST     Progress improving  -ST     Outcome Evaluation Pt seen for PT this PM with improved tolerance for activity. Pt continues to be positive for orthostatic hypotension, supine: 180/82, standing for 4 min: 100/67, reclined: 138/75. pt was less symptomatic today compared to previous session - was able to take a few steps to reach recliner for change in position. she continues to benefit from skileld PT to address mobility deficits. Recommend home with assist vs SNU  -ST       [facility] Name 07/17/24 1523          Therapy Assessment/Plan (PT)    Rehab Potential (PT) good, to achieve stated therapy goals  -ST     Criteria for Skilled Interventions Met (PT) yes  -ST     Therapy Frequency (PT) 5 times/wk  -ST       Row Name 07/17/24 1523          Positioning and Restraints    Pre-Treatment Position in bed  -ST     Post Treatment Position chair  -ST     In Chair notified nsg;reclined;call light within reach;encouraged to call for assist;exit alarm on  -ST               User Key  (r) = Recorded By, (t) = Taken By, (c) = Cosigned By      Initials Name Provider Type    Modesta Armas, PT Physical Therapist                   Outcome Measures       Row Name 07/17/24 1525 07/17/24 0910       How much help from another person do you currently need...    Turning from your back to your side while in flat bed without using bedrails? 3  -ST 3  -JH    Moving from lying on back to sitting on the side of a  flat bed without bedrails? 3  -ST 3  -JH    Moving to and from a bed to a chair (including a wheelchair)? 3  -ST 2  -JH    Standing up from a chair using your arms (e.g., wheelchair, bedside chair)? 3  -ST 2  -JH    Climbing 3-5 steps with a railing? 2  -ST 2  -JH    To walk in hospital room? 3  -ST 2  -JH    AM-PAC 6 Clicks Score (PT) 17  -ST 14  -    Highest Level of Mobility Goal 5 --> Static standing  -ST 4 --> Transfer to chair/commode  -      Row Name 07/17/24 1525          Functional Assessment    Outcome Measure Options AM-PAC 6 Clicks Basic Mobility (PT)  -               User Key  (r) = Recorded By, (t) = Taken By, (c) = Cosigned By      Initials Name Provider Type    Bertha Brown RN Registered Nurse    Modesta Armas PT Physical Therapist                                 Physical Therapy Education       Title: PT OT SLP Therapies (In Progress)       Topic: Physical Therapy (In Progress)       Point: Mobility training (Done)       Learning Progress Summary             Patient Acceptance, TB,E, VU,NR by  at 7/17/2024 1526    Acceptance, TB,E, NR by  at 7/16/2024 1010                         Point: Home exercise program (Not Started)       Learner Progress:  Not documented in this visit.              Point: Body mechanics (Not Started)       Learner Progress:  Not documented in this visit.              Point: Precautions (Not Started)       Learner Progress:  Not documented in this visit.                              User Key       Initials Effective Dates Name Provider Type Discipline     09/22/22 -  Modesta Cummings PT Physical Therapist PT                  PT Recommendation and Plan     Plan of Care Reviewed With: patient  Progress: improving  Outcome Evaluation: Pt seen for PT this PM with improved tolerance for activity. Pt continues to be positive for orthostatic hypotension, supine: 180/82, standing for 4 min: 100/67, reclined: 138/75. pt was less symptomatic today compared  to previous session - was able to take a few steps to reach recliner for change in position. she continues to benefit from skileld PT to address mobility deficits. Recommend home with assist vs SNU     Time Calculation:         PT Charges       Row Name 07/17/24 1527             Time Calculation    Start Time 1418  -ST      Stop Time 1437  -ST      Time Calculation (min) 19 min  -ST      PT Received On 07/17/24  -ST      PT - Next Appointment 07/18/24  -ST         Time Calculation- PT    Total Timed Code Minutes- PT 19 minute(s)  -ST         Timed Charges    42091 - PT Therapeutic Activity Minutes 19  -ST         Total Minutes    Timed Charges Total Minutes 19  -ST       Total Minutes 19  -ST                User Key  (r) = Recorded By, (t) = Taken By, (c) = Cosigned By      Initials Name Provider Type    ST Modesta Cummings PT Physical Therapist                  Therapy Charges for Today       Code Description Service Date Service Provider Modifiers Qty    86551092384  PT EVAL MOD COMPLEXITY 2 7/16/2024 Modesta Cummings, PT GP 1    [id number]  PT THERAPEUTIC ACT EA 15 MIN 7/17/2024 Modesta Cummings, PT GP 1            PT G-Codes  Outcome Measure Options: AM-PAC 6 Clicks Basic Mobility (PT)  AM-PAC 6 Clicks Score (PT): 17  AM-PAC 6 Clicks Score (OT): 10  Modified [place] Scale: 4 - Moderately severe disability.  Unable to walk without assistance, and unable to attend to own bodily needs without assistance.  PT Discharge Summary  Anticipated Discharge Disposition (PT): home with 24/7 care, home with home health, skilled nursing facility (pending progress)    Modesta Cummings PT  7/17/2024

## 2024-07-17 NOTE — PROGRESS NOTES
Name: Allison Fitzpatrick ADMIT: 7/15/2024   : 1934  PCP: Haleigh Nicolas MD    MRN: [id number] LOS: 1 days   AGE/SEX: 89 y.o. female  ROOM: [facility]     Subjective   Subjective   Patient is extremely hard of hearing.  Daughter at bedside and orthostatics are still very positive, with systolics going from 160 - 120.       Objective   Objective   Vital Signs  Temp:  [97.3 °F (36.3 °C)-98.2 °F (36.8 °C)] 97.9 °F (36.6 °C)  Heart Rate:  [79-92] 90  Resp:  [16-18] 16  BP: ()/() 120/69  SpO2:  [94 %-98 %] 98 %  on   ;   Device (Oxygen Therapy): room air  Body mass index is 19.03 kg/m².  Physical Exam  Constitutional:       General: She is not in acute distress.     Appearance: She is not ill-appearing.      Comments: Elderly, frail  Hard of hearing    Eyes:      Pupils: Pupils are equal, round, and reactive to light.   Cardiovascular:      Rate and Rhythm: Normal rate and regular rhythm.   Pulmonary:      Effort: Pulmonary effort is normal. No respiratory distress.   Abdominal:      General: Abdomen is flat. There is no distension.      Tenderness: There is no abdominal tenderness.   Musculoskeletal:         General: No swelling or deformity. Normal range of motion.   Skin:     General: Skin is warm and dry.   Neurological:      General: No focal deficit present.      Mental Status: She is alert. Mental status is at baseline.         Results Review     I reviewed the patient's new clinical results.  Results from last 7 days   Lab Units 24  0312 07/15/24  1526   WBC 10*3/mm3 6.65 7.03   HEMOGLOBIN g/dL 11.6* 11.8*   PLATELETS 10*3/mm3 171 180     Results from last 7 days   Lab Units 24  0312 07/15/24  1526   SODIUM mmol/L 144 143   POTASSIUM mmol/L 3.7 4.5   CHLORIDE mmol/L 109* 107   CO2 mmol/L 24.0 23.3   BUN mg/dL 29* 34*   CREATININE mg/dL 0.83 1.50*   GLUCOSE mg/dL 92 163*   Estimated Creatinine Clearance: 36.5 mL/min (by C-G formula based on SCr of 0.83 mg/dL).  Results from last 7  "days   Lab Units 07/15/24  1526   ALBUMIN g/dL 4.2   BILIRUBIN mg/dL 0.6   ALK PHOS U/L 46   AST (SGOT) U/L 20   ALT (SGPT) U/L 14     Results from last 7 days   Lab Units 07/16/24  0312 07/15/24  1526   CALCIUM mg/dL 8.8 9.1   ALBUMIN g/dL  --  4.2       COVID19   Date Value Ref Range Status   08/28/2020 Not Detected Not Detected - Ref. Range Final   08/22/2020 Not Detected Not Detected - Ref. Range Final     No results found for: \"HGBA1C\", \"POCGLU\"    Duplex Carotid Ultrasound CAR    Right internal carotid artery demonstrates a less than 50% stenosis.    Left internal carotid artery demonstrates a less than 50% stenosis.    I reviewed the patient's daily medications.  Scheduled Medications  [Held by provider] aspirin, 81 mg, Oral, Daily  atorvastatin, 40 mg, Oral, Daily  cefTRIAXone, 1,000 mg, Intravenous, Q24H  QUEtiapine, 25 mg, Oral, Nightly    Infusions  sodium chloride, 100 mL/hr, Last Rate: 100 mL/hr (07/16/24 1822)    Diet  Diet: Cardiac; Healthy Heart (2-3 Na+); Fluid Consistency: Thin (IDDSI 0)         I have personally reviewed:  [x]  Laboratory   [x]  Microbiology   [x]  Radiology   []  EKG/Telemetry   []  Cardiology/Vascular   []  Pathology   [x]  Records     Assessment/Plan     Active Hospital Problems    Diagnosis  POA    **Orthostatic syncope [I95.1]  Yes    Syncope [R55]  Yes    Dementia [F03.90]  Yes    HARRY (acute kidney injury) [N17.9]  Yes    HLD (hyperlipidemia) [E78.5]  Yes    Gait instability [R26.81]  Yes      Resolved Hospital Problems   No resolved problems to display.       89 y.o. female admitted with Orthostatic syncope.    Orthostatic syncope  -Orthostatics still positive.   -Check orthostatics every shift  -Stop amlodipine  -Echo pending, bilateral carotid duplex less than 50% stenosis  -Discussed with daughter the importance of making sure patient stays hydrated    HARRY  -Creatinine 1.5 on admission   Now resolved     Abnormal UA  -Treat with ceftriaxone until urine culture returns.  " Patient is not a reliable historian and states she is having some discomfort with urination, but this sensation has been going on for months.    Hyperlipidemia-continue home statin    GERD-continue home Pepcid    Dementia  -Some agitation overnight.  Started on seroquel    SCDs for DVT prophylaxis.  DNR.  Discussed with patient, family, and nursing staff.  Anticipate discharge home with family in 1-2 days. Pending blood pressures    Expected Discharge Date: 7/19/2024; Expected Discharge Time:       Grzegorz Lira MD  [facility]  07/17/24  11:28 EDT

## 2024-07-17 NOTE — CONSULTS
"Access talked with pt regarding brief suicidal remark she made to staff. Pt is extremely hard of hearing and had to be asked several times if she was suicidal. Pt finally able to understand question and stated \"Oh no. I'm just ready to go.\" Pt also stated \" I get aggravated sometimes because I try to take care of everybody else.\"Access talked with pt's daughter who mentioned to staff pt has made brief suicidal statements in past but only when aggravated. Pt has dementia dx and sundowners. Pt's daughter does not feel pt would harm self and states she does well most of the time. Daughter states pt enjoys visiting with family,including grandchildren. Daughter states pt has no hx of suicide attempts or any psychiatric treatment. Access offered psychiatrist consult but daughter felt it was not necessary as pt does well most of the time. Access will not follow. Please re consult if need arises.  "

## 2024-07-17 NOTE — CASE MANAGEMENT/SOCIAL WORK
Continued Stay Note  [facility]     Patient Name: Allison Fitzpatrick  MRN: [id number]  Today's Date: 7/17/2024    Admit Date: 7/15/2024    Plan: [facility] prior to returning home.  Plan for D/C Friday. Per [facility] will have bed. Needs stretcher transport.   Discharge Plan       Row Name 07/17/24 1613       Plan    Plan [facility] prior to returning home.  Plan for D/C Friday. Per [facility] will have bed. Needs stretcher transport.    Patient/Family in Agreement with Plan yes    Plan Comments Per [facility], they can accept pt on Friday. Discussed in huddle and all aware. Daughter updated on [facility] acceptance. Lan Moncada RN-BC                   Discharge Codes    No documentation.                 Expected Discharge Date and Time       Expected Discharge Date Expected Discharge Time    Jul 19, 2024               Lan Moncada RN

## 2024-07-17 NOTE — PROGRESS NOTES
"Nutrition Services    Patient Name:  Allison Fitzpatrick  YOB: 1934  MRN: [id number]  Admit Date:  7/15/2024    Assessment Date:  07/17/24    Summary: Initial Visit for MST 2  Pt is a 89 y/o female who presented with AMS. Pt has a hx of diverticulitis, HTN, HLD, HARRY, dementia.  Pt laying in bed when I visited. Pt is incredibly hard of hearing and was not able to provide much of a hx. Per EMR, pt ate 50% of breakfast yesterday and 75% of lunch and dinner yesterday. Pt weight appears stable per EMR. RD performed NFPE which revealed significant muscle wasting and fat loss. RD will add supplements.     Patient meets ASPEN/AND criteria for nutrition diagnosis of severe malnutrition of chronic illness based on: muscle wasting and fat loss.     NFPE results below    RECS:  Boost Plus QD    CLINICAL NUTRITION ASSESSMENT      Reason for Assessment MST score 2+     Diagnosis/Problem   AMS. Pt has a hx of diverticulitis, HTN, HLD, HARRY, dementia.   Medical/Surgical History Past Medical History:   Diagnosis Date    Acute ischemic stroke 8/23/2020    Arthritis     Arthritis     Bilateral hearing loss     Bladder problem     Dementia     Difficulty walking     Diverticulitis     Diverticulosis     Fracture of one rib of left side 3/28/2018    Hard of hearing     Headache, tension-type     History of colon polyps     Hypertension     Irregular heart beat     Macular degeneration     Memory loss     Sleep apnea     Syncope and collapse 3/28/2018       Past Surgical History:   Procedure Laterality Date    ABDOMINAL SURGERY      CATARACT EXTRACTION Bilateral     COLONOSCOPY N/A 6/6/2016    ih, tics, hp polyp     COLONOSCOPY W/ POLYPECTOMY      ENDOSCOPY N/A 6/6/2016    nml     HYSTERECTOMY      TONSILLECTOMY          Anthropometrics        Current Height  Current Weight  BMI kg/m2 Height: 162.6 cm (64\")  Weight: 50.3 kg (110 lb 14.3 oz) (07/17/24 0500)  Body mass index is 19.03 kg/m².   Adjusted BMI (if applicable)  "   BMI Category Normal/Healthy (18.4 - 24.9)   Ideal Body Weight (IBW) 120 lbs   Usual Body Weight (UBW) 115 lbs   Weight Trend Stable   Weight History Wt Readings from Last 30 Encounters:   07/17/24 0500 50.3 kg (110 lb 14.3 oz)   07/15/24 1520 49 kg (108 lb 0.4 oz)   04/23/24 0919 49.4 kg (109 lb)   12/18/23 1105 49.9 kg (110 lb)   10/12/23 0858 51.3 kg (113 lb)   09/02/23 2200 54.4 kg (120 lb)   04/11/23 0915 54.4 kg (120 lb)   09/22/22 0807 58.1 kg (128 lb)   03/22/22 0816 61.2 kg (135 lb)   12/14/21 1353 57.2 kg (126 lb)   12/03/20 1441 57.6 kg (127 lb)   10/12/20 1250 62.6 kg (138 lb 1.6 oz)   08/28/20 0413 63.6 kg (140 lb 3.4 oz)   08/24/20 1059 61.7 kg (136 lb)   08/22/20 1958 61.9 kg (136 lb 7.4 oz)   08/19/20 0912 62.6 kg (138 lb)   08/13/20 1125 62.8 kg (138 lb 6.4 oz)   12/13/19 1054 59.9 kg (132 lb)   10/02/19 1350 61.2 kg (135 lb)   08/16/19 1927 66.2 kg (146 lb)   03/05/19 0940 63.5 kg (140 lb)   02/12/19 1331 66.4 kg (146 lb 4.8 oz)   10/23/18 1426 65.6 kg (144 lb 11.2 oz)   10/08/18 1512 66.6 kg (146 lb 14.4 oz)   06/26/18 1021 64 kg (141 lb)   05/15/18 1407 64.4 kg (142 lb)   03/28/18 1505 65.3 kg (144 lb)   03/28/18 0847 65.8 kg (145 lb)   03/15/18 1503 66.7 kg (147 lb)   02/08/18 0956 65.3 kg (143 lb 14.4 oz)   10/28/17 1109 64.9 kg (143 lb)   07/01/17 1010 64.4 kg (142 lb)   09/01/16 1601 66.2 kg (146 lb)   06/06/16 0916 64.1 kg (141 lb 6.4 oz)        Estimated/Assessed Needs        Energy Requirements    Weight for Calculation 50 kg   Method for Estimation  25-30 kcal/kg   EST Needs (kcal/day) 0813-0597       Protein Requirements    Weight for Calculation 50 kg   EST Protein Needs (g/kg) 1.5 - 2.0 gm/kg   EST Daily Needs (g/day)        Fluid Requirements     Method for Estimation 1 mL/kcal    Estimated Needs (mL/day)        Fluid Deficit    Current Na Level (mEq/L)    Desired Na Level (mEq/L)    Estimated Fluid Deficit (L)       Labs       Pertinent Labs    Results from last 7 days   Lab  Units 07/16/24  0312 07/15/24  1526   SODIUM mmol/L 144 143   POTASSIUM mmol/L 3.7 4.5   CHLORIDE mmol/L 109* 107   CO2 mmol/L 24.0 23.3   BUN mg/dL 29* 34*   CREATININE mg/dL 0.83 1.50*   CALCIUM mg/dL 8.8 9.1   BILIRUBIN mg/dL  --  0.6   ALK PHOS U/L  --  46   ALT (SGPT) U/L  --  14   AST (SGOT) U/L  --  20   GLUCOSE mg/dL 92 163*     Results from last 7 days   Lab Units 07/16/24  0312 07/15/24  1526   HEMOGLOBIN g/dL 11.6* 11.8*   HEMATOCRIT % 35.6 36.5   WBC 10*3/mm3 6.65 7.03   ALBUMIN g/dL  --  4.2     Results from last 7 days   Lab Units 07/16/24  0312 07/15/24  1526   PLATELETS 10*3/mm3 171 180     COVID19   Date Value Ref Range Status   08/28/2020 Not Detected Not Detected - Ref. Range Final     Lab Results   Component Value Date    HGBA1C 6.10 (H) 08/24/2020          Medications           Scheduled Medications [Held by provider] aspirin, 81 mg, Oral, Daily  atorvastatin, 40 mg, Oral, Daily  cefTRIAXone, 1,000 mg, Intravenous, Q24H  QUEtiapine, 25 mg, Oral, Nightly       Infusions sodium chloride, 100 mL/hr, Last Rate: 100 mL/hr (07/16/24 1822)       PRN Medications   acetaminophen **OR** acetaminophen **OR** acetaminophen    senna-docusate sodium **AND** polyethylene glycol **AND** bisacodyl **AND** bisacodyl    famotidine    melatonin    ondansetron ODT **OR** ondansetron    [COMPLETED] Insert Peripheral IV **AND** sodium chloride     Physical Findings          General Findings alert, frail, generalized wasting, hard of hearing, loss of muscle mass, loss of subcutaneous fat, room air   Oral/Mouth Cavity WDL   Edema  not assessed   Gastrointestinal WDL   Skin  pressure injury: coccyx   Tubes/Drains/Lines none   NFPE See Malnutrition Severity Assessment     Malnutrition Severity Assessment      Patient meets criteria for : (P) Severe Malnutrition  Malnutrition Type (Last 8 Hours)       Malnutrition Severity Assessment       Row Name 07/17/24 0953       Malnutrition Severity Assessment    Malnutrition Type  Chronic Disease - Related Malnutrition (P)       Row Name 07/17/24 0953       Insufficient Energy Intake     Insufficient Energy Intake Findings None (P)       Row Name 07/17/24 0953       Unintentional Weight Loss     Unintentional Weight Loss Findings None (P)       Row Name 07/17/24 0953       Muscle Loss    Loss of Muscle Mass Findings Severe (P)     [place]azidism Region Severe - deep hollowing/scooping, lack of muscle to touch, facial bones well defined (P)     Clavicle Bone Region Severe - protruding prominent bone (P)     Acromion Bone Region Severe - squared shoulders, bones, and acromion process protrusion prominent (P)     Scapular Bone Region Severe - prominent bones, depressions easily visible between ribs, scapula, spine, shoulders (P)     Dorsal Hand Region Severe - prominent depression (P)       [facility] Name 07/17/24 0953       Fat Loss    Subcutaneous Fat Loss Findings Severe (P)     Orbital Region  Severe - pronounced hollowness/depression, dark circles, loose saggy skin (P)     Upper Arm Region Severe - mostly skin, very little space between folds, fingers touch (P)       Row Name 07/17/24 0953       Criteria Met (Must meet criteria for severity in at least 2 of these categories: M Wasting, Fat Loss, Fluid, Secondary Signs, Wt. Status, Intake)    Patient meets criteria for  Severe Malnutrition (P)                      Current Nutrition Orders & Evaluation of Intake       Oral Nutrition     Food Allergies NKFA   Current PO Diet Diet: Cardiac; Healthy Heart (2-3 Na+); Fluid Consistency: Thin (IDDSI 0)   Supplement n/a   PO Evaluation     % PO Intake 50-75% of recorded meals while admitted per EMR    Factors Affecting Intake: No factors at this time     PES STATEMENT / NUTRITION DIAGNOSIS      Nutrition Dx Problem  Problem: Malnutrition (severe)  Etiology: Medical Diagnosis - AMS. Pt has a hx of diverticulitis, HTN, HLD, HARRY, dementia.    Signs/Symptoms: NFPE Results   --  NUTRITION INTERVENTION / PLAN OF CARE       Intervention Goal(s) Maintain nutrition status, Establish goals of care, Meet estimated needs, Maintain intake, Accepts oral nutrition supplement, Maintain weight, No significant weight loss, and PO intake goal %: 75%         RD Intervention/Action Encourage intake, Continue to monitor, Care plan reviewed, and Recommend/order: Boost QD         Prescription/Orders:       PO Diet       Supplements Boost QD      Enteral Nutrition       Parenteral Nutrition    New Prescription Ordered? Yes   --      Monitor/Evaluation Per protocol, PO intake, Supplement intake, Weight, Skin status, GI status, Symptoms, POC/GOC, Swallow function   Discharge Plan/Needs No discharge needs identified at this time   --    RD to follow per protocol.      Electronically signed by:  Jaleel German  07/17/24 09:47 EDT

## 2024-07-18 PROBLEM — E43 SEVERE MALNUTRITION: Status: ACTIVE | Noted: 2024-07-18

## 2024-07-18 LAB
ANION GAP SERPL CALCULATED.3IONS-SCNC: 12.9 MMOL/L (ref 5–15)
BUN SERPL-MCNC: 24 MG/DL (ref 8–23)
BUN/CREAT SERPL: 25 (ref 7–25)
CALCIUM SPEC-SCNC: 8.9 MG/DL (ref 8.6–10.5)
CHLORIDE SERPL-SCNC: 108 MMOL/L (ref 98–107)
CO2 SERPL-SCNC: 20.1 MMOL/L (ref 22–29)
CREAT SERPL-MCNC: 0.96 MG/DL (ref 0.57–1)
DEPRECATED RDW RBC AUTO: 45.5 FL (ref 37–54)
EGFRCR SERPLBLD CKD-EPI 2021: 56.7 ML/MIN/1.73
ERYTHROCYTE [DISTWIDTH] IN BLOOD BY AUTOMATED COUNT: 12.7 % (ref 12.3–15.4)
GLUCOSE SERPL-MCNC: 143 MG/DL (ref 65–99)
HCT VFR BLD AUTO: 36.9 % (ref 34–46.6)
HGB BLD-MCNC: 11.6 G/DL (ref 12–15.9)
MCH RBC QN AUTO: 30.2 PG (ref 26.6–33)
MCHC RBC AUTO-ENTMCNC: 31.4 G/DL (ref 31.5–35.7)
MCV RBC AUTO: 96.1 FL (ref 79–97)
PLATELET # BLD AUTO: 168 10*3/MM3 (ref 140–450)
PMV BLD AUTO: 10.2 FL (ref 6–12)
POTASSIUM SERPL-SCNC: 3.4 MMOL/L (ref 3.5–5.2)
RBC # BLD AUTO: 3.84 10*6/MM3 (ref 3.77–5.28)
SODIUM SERPL-SCNC: 141 MMOL/L (ref 136–145)
WBC NRBC COR # BLD AUTO: 9.62 10*3/MM3 (ref 3.4–10.8)

## 2024-07-18 PROCEDURE — 80048 BASIC METABOLIC PNL TOTAL CA: CPT | Performed by: STUDENT IN AN ORGANIZED HEALTH CARE EDUCATION/TRAINING PROGRAM

## 2024-07-18 PROCEDURE — 25010000002 CEFTRIAXONE PER 250 MG: Performed by: STUDENT IN AN ORGANIZED HEALTH CARE EDUCATION/TRAINING PROGRAM

## 2024-07-18 PROCEDURE — 85027 COMPLETE CBC AUTOMATED: CPT | Performed by: STUDENT IN AN ORGANIZED HEALTH CARE EDUCATION/TRAINING PROGRAM

## 2024-07-18 PROCEDURE — 99221 1ST HOSP IP/OBS SF/LOW 40: CPT | Performed by: INTERNAL MEDICINE

## 2024-07-18 PROCEDURE — 25810000003 SODIUM CHLORIDE 0.9 % SOLUTION: Performed by: INTERNAL MEDICINE

## 2024-07-18 RX ADMIN — Medication 2.5 MG: at 01:55

## 2024-07-18 RX ADMIN — SENNOSIDES AND DOCUSATE SODIUM 2 TABLET: 50; 8.6 TABLET ORAL at 08:26

## 2024-07-18 RX ADMIN — QUETIAPINE FUMARATE 25 MG: 25 TABLET ORAL at 20:55

## 2024-07-18 RX ADMIN — SODIUM CHLORIDE 100 ML/HR: 9 INJECTION, SOLUTION INTRAVENOUS at 11:21

## 2024-07-18 RX ADMIN — BISACODYL 5 MG: 5 TABLET, COATED ORAL at 01:55

## 2024-07-18 RX ADMIN — ACETAMINOPHEN 325MG 650 MG: 325 TABLET ORAL at 01:55

## 2024-07-18 RX ADMIN — CEFTRIAXONE SODIUM 1000 MG: 1 INJECTION, POWDER, FOR SOLUTION INTRAMUSCULAR; INTRAVENOUS at 11:22

## 2024-07-18 RX ADMIN — SODIUM CHLORIDE 100 ML/HR: 9 INJECTION, SOLUTION INTRAVENOUS at 01:43

## 2024-07-18 RX ADMIN — ATORVASTATIN CALCIUM 40 MG: 20 TABLET, FILM COATED ORAL at 08:21

## 2024-07-18 RX ADMIN — SODIUM CHLORIDE 100 ML/HR: 9 INJECTION, SOLUTION INTRAVENOUS at 21:19

## 2024-07-18 NOTE — PROGRESS NOTES
Name: Allison Fitzpatrick ADMIT: 7/15/2024   : 1934  PCP: Haleigh Nicolas MD    MRN: [id number] LOS: 2 days   AGE/SEX: 89 y.o. female  ROOM: [facility]     Subjective   Subjective   Once again, orthostatic signs remain positive.  Echocardiogram was concerning for a possible abnormality of the tricuspid valve however it was a 4 view and abnormality could have just been the tricuspid valve itself.  She does have a murmur in the tricuspid area.       Objective   Objective   Vital Signs  Temp:  [97.5 °F (36.4 °C)-98.6 °F (37 °C)] 97.5 °F (36.4 °C)  Heart Rate:  [78-92] 78  Resp:  [16] 16  BP: (116-183)/(68-96) 154/68  SpO2:  [94 %-100 %] 100 %  on   ;   Device (Oxygen Therapy): room air  Body mass index is 18.92 kg/m².  Physical Exam  Constitutional:       General: She is not in acute distress.     Appearance: She is not ill-appearing.      Comments: Elderly, frail  Hard of hearing    Eyes:      Pupils: Pupils are equal, round, and reactive to light.   Cardiovascular:      Rate and Rhythm: Normal rate and regular rhythm.      Heart sounds: Murmur heard.   Pulmonary:      Effort: Pulmonary effort is normal. No respiratory distress.   Abdominal:      General: Abdomen is flat. There is no distension.      Tenderness: There is no abdominal tenderness.   Musculoskeletal:         General: No swelling or deformity. Normal range of motion.   Skin:     General: Skin is warm and dry.   Neurological:      General: No focal deficit present.      Mental Status: She is alert. Mental status is at baseline.         Results Review     I reviewed the patient's new clinical results.  Results from last 7 days   Lab Units 24  0950 24  0312 07/15/24  1526   WBC 10*3/mm3 9.62 6.65 7.03   HEMOGLOBIN g/dL 11.6* 11.6* 11.8*   PLATELETS 10*3/mm3 168 171 180     Results from last 7 days   Lab Units 24  0950 24  0312 07/15/24  1526   SODIUM mmol/L 141 144 143   POTASSIUM mmol/L 3.4* 3.7 4.5   CHLORIDE mmol/L 108* 109*  "107   CO2 mmol/L 20.1* 24.0 23.3   BUN mg/dL 24* 29* 34*   CREATININE mg/dL 0.96 0.83 1.50*   GLUCOSE mg/dL 143* 92 163*   Estimated Creatinine Clearance: 31.4 mL/min (by C-G formula based on SCr of 0.96 mg/dL).  Results from last 7 days   Lab Units 07/15/24  1526   ALBUMIN g/dL 4.2   BILIRUBIN mg/dL 0.6   ALK PHOS U/L 46   AST (SGOT) U/L 20   ALT (SGPT) U/L 14     Results from last 7 days   Lab Units 07/18/24  0950 07/16/24  0312 07/15/24  1526   CALCIUM mg/dL 8.9 8.8 9.1   ALBUMIN g/dL  --   --  4.2       COVID19   Date Value Ref Range Status   08/28/2020 Not Detected Not Detected - Ref. Range Final   08/22/2020 Not Detected Not Detected - Ref. Range Final     No results found for: \"HGBA1C\", \"POCGLU\"    Adult Transthoracic Echo Complete W/ Cont if Necessary Per Protocol    The study is technically difficult for diagnosis.  Views are very off   axis    Left ventricular systolic function is normal. Calculated left   ventricular EF = 52%    Left ventricular diastolic function was indeterminate.    Mild mitral valve regurgitation is present.    Mild tricuspid valve regurgitation is present.    Estimated right ventricular systolic pressure from tricuspid   regurgitation is normal (<35 mmHg). Calculated right ventricular systolic   pressure from tricuspid regurgitation is 28 mmHg.    There is a few views which there is a suggestion of a potential   vegetation on the tricuspid valve.  However as noted above views are off   axis and this could easily be the valve itself.  Clinical correlation   required is not definitive but suggested.    I reviewed the patient's daily medications.  Scheduled Medications  [Held by provider] aspirin, 81 mg, Oral, Daily  atorvastatin, 40 mg, Oral, Daily  cefTRIAXone, 1,000 mg, Intravenous, Q24H  QUEtiapine, 25 mg, Oral, Nightly    Infusions  sodium chloride, 100 mL/hr, Last Rate: 100 mL/hr (07/18/24 0827)    Diet  Diet: Cardiac; Healthy Heart (2-3 Na+); Fluid Consistency: Thin (IDDSI 0)     "     I have personally reviewed:  [x]  Laboratory   [x]  Microbiology   [x]  Radiology   []  EKG/Telemetry   []  Cardiology/Vascular   []  Pathology   [x]  Records     Assessment/Plan     Active Hospital Problems    Diagnosis  POA    **Orthostatic syncope [I95.1]  Yes    Syncope [R55]  Yes    Dementia [F03.90]  Yes    HARRY (acute kidney injury) [N17.9]  Yes    HLD (hyperlipidemia) [E78.5]  Yes    Gait instability [R26.81]  Yes      Resolved Hospital Problems   No resolved problems to display.       89 y.o. female admitted with Orthostatic syncope.    Orthostatic syncope  -Orthostatics still positive.   -Check orthostatics every shift  -Stop amlodipine  -Echo findings as above.  Suggestion of a potential vegetation on the tricuspid valve.  Will ask cardiology for evaluation but doubt that there is any thing to do about this-if suspicion for endocarditis very low suspicion for endocarditis patient is not presenting with infectious symptoms.   bilateral carotid duplex less than 50% stenosis    HARRY  -Creatinine 1.5 on admission   Now resolved     Abnormal UA  -Treat with ceftriaxone until urine culture returns.  Patient is not a reliable historian and states she is having some discomfort with urination, but this sensation has been going on for months.  -Status post 3 days of ceftriaxone    Hyperlipidemia-continue home statin    GERD-continue home Pepcid    Dementia  -Some agitation overnight.  Started on seroquel    SCDs for DVT prophylaxis.  DNR.  Discussed with patient, family, and nursing staff.  Anticipate discharge to be determined     Expected Discharge Date: 7/19/2024; Expected Discharge Time:       Grzegorz Lira MD  [facility]  07/18/24  11:16 EDT

## 2024-07-18 NOTE — CONSULTS
[facility]  Cardiology Consult Note    Patient Identification:  Name: Allison Fitzpatrick  Age: 89 y.o.  Sex: female  :  1934  MRN: [id number]             Requesting Physician: Dr Lira    Reason for Consultation / Chief Complaint: profound orthostasis and abnormal TTE    History of Present Illness:     This is a 89-year-old female who is known with our service with hypertension, history of PSVT, stroke, dementia.  She presented to [facility] ER for syncopal episode while going to an eye appointment, also some episodes of dizziness.  Workup in ER high-sensitivity troponin 39, repeat 29.  , creatinine 1.5, CBC relatively unremarkable other than hemoglobin of 11.  Chest x-ray with chronic changes in the chest without evidence for acute disease.  CT head without acute process identified. Orthostatics were positive and she has been given IV fluids.  Amlodipine was discontinued.    Echo 2024 EF 52%, indeterminate LV diastolic function, mild MR and TR.  Technically difficult study and diffuse are off axis few views suggestive of potential vegetation on the tricuspid valve but is noted views are off axis clinical correlation recommended.    Stress test 2018 was a normal myocardial perfusion study with no evidence of ischemia.  Duplex carotid artery 2024 bilateral less than 50% stenosis.    Comorbid cardiac risk factors: age, hypertension    Past Medical History:  Past Medical History:   Diagnosis Date    Acute ischemic stroke 2020    Arthritis     Arthritis     Bilateral hearing loss     Bladder problem     Dementia     Difficulty walking     Diverticulitis     Diverticulosis     Fracture of one rib of left side 3/28/2018    Hard of hearing     Headache, tension-type     History of colon polyps     Hypertension     Irregular heart beat     Macular degeneration     Memory loss     Sleep apnea     Syncope and collapse 3/28/2018     Past Surgical History:  Past  Surgical History:   Procedure Laterality Date    ABDOMINAL SURGERY      CATARACT EXTRACTION Bilateral     COLONOSCOPY N/A 6/6/2016    ih, tics, hp polyp     COLONOSCOPY W/ POLYPECTOMY      ENDOSCOPY N/A 6/6/2016    nml     HYSTERECTOMY      TONSILLECTOMY        Allergies:  No Known Allergies  Home Meds:  Medications Prior to Admission   Medication Sig Dispense Refill Last Dose    aspirin 81 MG chewable tablet Chew 1 tablet Daily.   Past Week    famotidine (PEPCID) 40 MG tablet TAKE 1 TABLET BY MOUTH AT NIGHT  AS NEEDED FOR HEARTBURN 90 tablet 3     acetaminophen (TYLENOL) 650 MG 8 hr tablet Take 1 tablet by mouth Every 8 (Eight) Hours As Needed for Mild Pain.       amLODIPine (NORVASC) 5 MG tablet TAKE 1 TABLET BY MOUTH DAILY 90 tablet 3 Unknown    atorvastatin (LIPITOR) 40 MG tablet TAKE 1 TABLET BY MOUTH DAILY 90 tablet 3 Unknown    loperamide (IMODIUM) 2 MG capsule Take 1 capsule by mouth 4 (Four) Times a Day As Needed for Diarrhea.       Mirabegron ER (Myrbetriq) 25 MG tablet sustained-release 24 hour 24 hr tablet Take 1 tablet by mouth Daily. Urinary incontinence 90 tablet 1     neomycin-polymyxin-hydrocortisone (CORTISPORIN) 3.5-62545-7 otic solution Administer 3 drops into both ears 3 (Three) Times a Day. 10 mL 0     triamcinolone (KENALOG) 0.1 % ointment Apply 1 Application topically to the appropriate area as directed 2 (Two) Times a Day. ears 30 g 1      Current Meds:   Current Facility-Administered Medications   Medication Dose Route Frequency Provider Last Rate Last Admin    acetaminophen (TYLENOL) tablet 650 mg  650 mg Oral Q4H PRN Emily Irvin MD   650 mg at 07/18/24 0155    Or    acetaminophen (TYLENOL) 160 MG/5ML oral solution 650 mg  650 mg Oral Q4H PRN Emily Irvin MD        Or    acetaminophen (TYLENOL) suppository 650 mg  650 mg Rectal Q4H PRN Emily Irvin MD        [Held by provider] aspirin chewable tablet 81 mg  81 mg Oral Daily Emily Irvin MD         atorvastatin (LIPITOR) tablet 40 mg  40 mg Oral Daily Emily Irvin MD   40 mg at 24 0821    sennosides-docusate (PERICOLACE) 8.6-50 MG per tablet 2 tablet  2 tablet Oral BID PRN Emily Irvin MD   2 tablet at 24 0826    And    polyethylene glycol (MIRALAX) packet 17 g  17 g Oral Daily PRN Emily Irvin MD        And    bisacodyl (DULCOLAX) EC tablet 5 mg  5 mg Oral Daily PRN Emily Irvin MD   5 mg at 24 0155    And    bisacodyl (DULCOLAX) suppository 10 mg  10 mg Rectal Daily PRN Emily Irvin MD        cefTRIAXone (ROCEPHIN) 1,000 mg in sodium chloride 0.9 % 100 mL MBP  1,000 mg Intravenous Q24H Kareem Wallace  mL/hr at 24 1122 1,000 mg at 24 1122    famotidine (PEPCID) tablet 40 mg  40 mg Oral Nightly PRN Emily Irvin MD        melatonin tablet 2.5 mg  2.5 mg Oral Nightly PRN Emily Irvin MD   2.5 mg at 24 0155    ondansetron ODT (ZOFRAN-ODT) disintegrating tablet 4 mg  4 mg Oral Q6H PRN Emily Irvin MD        Or    ondansetron (ZOFRAN) injection 4 mg  4 mg Intravenous Q6H PRN Emily Irvin MD        QUEtiapine (SEROquel) tablet 25 mg  25 mg Oral Nightly Kareem Wallace MD   25 mg at 24    sodium chloride 0.9 % flush 10 mL  10 mL Intravenous PRN Asher Mcknight MD        sodium chloride 0.9 % infusion  100 mL/hr Intravenous Continuous Emily Irvin  mL/hr at 24 1121 100 mL/hr at 24 112       Social History:   Social History     Tobacco Use    Smoking status: Never    Smokeless tobacco: Never   Substance Use Topics    Alcohol use: No      Family History:  Family History   Problem Relation Age of Onset    Colon cancer Mother     Arthritis Mother     Dementia Mother     Diabetes type II Sister     Diabetes type II Brother     Stomach cancer Brother     Coronary artery disease Father 60         MI in 60s    Breast cancer Neg Hx     Stroke Neg Hx      "    Review of Systems  Constitutional: No wt loss, fever   Gastrointestinal: No nausea , abdominal pain  Behavioral/Psych: No insomnia or anxiety   Cardiovascular ----positive for short of breath. All other systems reviewed and are negative            /97 (BP Location: Left arm, Patient Position: Standing)   Pulse 83   Temp 98.1 °F (36.7 °C) (Oral)   Resp 16   Ht 162.6 cm (64\")   Wt 50 kg (110 lb 3.7 oz)   LMP  (LMP Unknown)   SpO2 97%   BMI 18.92 kg/m²   General appearance: No acute changes   Neck: Trachea midline; NECK, supple, no thyromegaly or lymphadenopathy   Lungs: Normal size and shape, normal breath sounds, equal distribution of air, no rales and rhonchi   CV: S1-S2 regular, no murmurs, no rub, no gallop   Abdomen: Soft, nontender; no masses , no abnormal abdominal sounds   Extremities: No deformity , normal color , no peripheral edema   Skin: Normal temperature, turgor and texture; no rash, ulcers                Cardiographics  ECG:     ECG 12/18/2023 for comparison      Echocardiogram:   Interpretation Summary         The study is technically difficult for diagnosis.  Views are very off axis    Left ventricular systolic function is normal. Calculated left ventricular EF = 52%    Left ventricular diastolic function was indeterminate.    Mild mitral valve regurgitation is present.    Mild tricuspid valve regurgitation is present.    Estimated right ventricular systolic pressure from tricuspid regurgitation is normal (<35 mmHg). Calculated right ventricular systolic pressure from tricuspid regurgitation is 28 mmHg.    There is a few views which there is a suggestion of a potential vegetation on the tricuspid valve.  However as noted above views are off axis and this could easily be the valve itself.  Clinical correlation required is not definitive but suggested.     2018Interpretation Summary    Findings consistent with a normal ECG stress test.  Left ventricular ejection fraction is normal " (Calculated EF = 70%).  Myocardial perfusion imaging indicates a normal myocardial perfusion study with no evidence of ischemia.  Impressions are consistent with a low risk study.    Imaging  Chest X-ray:   IMPRESSION:  1. Chronic changes in the chest without evidence for acute disease.  2. Mild soft tissue swelling anterior to the right patella and the  patellar tendon.      This report was finalized on 7/15/2024 7:17 PM by Dr. Oziel Weiss M.D on Workstation: [id number]     Lab Review   Results from last 7 days   Lab Units 07/15/24  2026 07/15/24  1526   HSTROP T ng/L 29* 39*         Results from last 7 days   Lab Units 07/18/24  0950   SODIUM mmol/L 141   POTASSIUM mmol/L 3.4*   BUN mg/dL 24*   CREATININE mg/dL 0.96   CALCIUM mg/dL 8.9     @[id number]@  Results from last 7 days   Lab Units 07/18/24  0950 07/16/24  0312 07/15/24  1526   WBC 10*3/mm3 9.62 6.65 7.03   HEMOGLOBIN g/dL 11.6* 11.6* 11.8*   HEMATOCRIT % 36.9 35.6 36.5   PLATELETS 10*3/mm3 168 171 180             Assessment:  Orthostatic syncope  Acute kidney injury  Hyperlipidemia  Dementia  Hard of hearing      Recommendations / Plan:   This is a pleasant but very hard of hearing 89-year-old female who is known to our service admitted for syncope found to be orthostatic, on amlodipine at home which has been discontinued and treated with IV fluids, as well as abnormal UA on abx. Her cr was elevated on admission and this has improved with hydration.  Echo technically difficult study for diagnosis EF 52%, indeterminate LV diastolic function, concern for some views with possible abnormal imaging of tricuspid valve concern fr vegetation but was also suboptimal study and axis off. Dr Garrett to review echo. She has normal white count, afebrile, no chest pain or shortness of breath, no clinical signs of vegetation, no further workup for abnormal echo indicated at this time. Will check orthostatic vitals today. Continue hydration and holding  antihypertensives.         Corazon Mariscal, APRN  7/18/2024, 11:20 EDT  89-year-old female with known history of hyperlipidemia dementia has been admitted with severe orthostatic hypotension leading to syncope multifactorial has been partially reversed with IV fluids  Abnormal echocardiogram with possible vegetation  Considering the clinically no signs and symptoms of endocarditis will be treated conservatively  MD SALVATORE Ogden/Transcription:   Dictated utilizing Dragon dictation

## 2024-07-18 NOTE — CASE MANAGEMENT/SOCIAL WORK
Continued Stay Note  [facility]     Patient Name: Allison Fitzpatrick  MRN: [id number]  Today's Date: 7/18/2024    Admit Date: 7/15/2024    Plan: [facility] to [facility] home. Per Nad[facility] can accept 7/19. Needs stretcher transport.  Waiting to see Cards plan for possible vegetation on valve seen on echo   Discharge Plan       Row Name 07/18/24 1612       Plan    Plan [facility] prior to returning home. Per Nad[facility] can accept 7/19. Needs stretcher transport.  Waiting to see Cards plan for possible vegetation on valve seen on echo    Patient/Family in Agreement with Plan yes    Plan Comments Echo showed, that there is a suggestion of a potential vegetation on the tricuspid valve. Cardiology to see. D/C currently pending Cardiology plan. Per [facility] can accept 7/19. Needs stretcher transport. Needs stretcher transport at D/C. Lan Moncada RN-BC                   Discharge Codes    No documentation.                 Expected Discharge Date and Time       Expected Discharge Date Expected Discharge Time    Jul 19, 2024               Lan Moncada RN

## 2024-07-18 NOTE — SIGNIFICANT NOTE
Orthostatic Blood Pressure    Patient stated that she felt dizzy when she stood up.      07/18/24 1316 07/18/24 1318 07/18/24 1320   Vital Signs   Heart Rate 81 83 84   Heart Rate Source Monitor  --   --    Resp 16  --   --    Resp Rate Source Visual  --   --    /84 135/67 124/97   Noninvasive MAP (mmHg) 101 86 106   BP Location Left arm Left arm Left arm   BP Method Automatic Automatic Automatic   Patient Position Lying Sitting Standing

## 2024-07-18 NOTE — SIGNIFICANT NOTE
07/18/24 1536   OTHER   Discipline physical therapist   Rehab Time/Intention   Session Not Performed other (see comments)  (pt sleeping soundly upon arrival, unable to keep pt awake long enought to participate in PT, will f/u)   Recommendation   PT - Next Appointment 07/19/24

## 2024-07-19 LAB
ANION GAP SERPL CALCULATED.3IONS-SCNC: 13 MMOL/L (ref 5–15)
BUN SERPL-MCNC: 25 MG/DL (ref 8–23)
BUN/CREAT SERPL: 24 (ref 7–25)
CALCIUM SPEC-SCNC: 9 MG/DL (ref 8.6–10.5)
CHLORIDE SERPL-SCNC: 109 MMOL/L (ref 98–107)
CO2 SERPL-SCNC: 20 MMOL/L (ref 22–29)
CREAT SERPL-MCNC: 1.04 MG/DL (ref 0.57–1)
DEPRECATED RDW RBC AUTO: 43 FL (ref 37–54)
EGFRCR SERPLBLD CKD-EPI 2021: 51.5 ML/MIN/1.73
ERYTHROCYTE [DISTWIDTH] IN BLOOD BY AUTOMATED COUNT: 12.9 % (ref 12.3–15.4)
GLUCOSE SERPL-MCNC: 109 MG/DL (ref 65–99)
HCT VFR BLD AUTO: 35.8 % (ref 34–46.6)
HGB BLD-MCNC: 11.8 G/DL (ref 12–15.9)
MCH RBC QN AUTO: 30.3 PG (ref 26.6–33)
MCHC RBC AUTO-ENTMCNC: 33 G/DL (ref 31.5–35.7)
MCV RBC AUTO: 91.8 FL (ref 79–97)
PLATELET # BLD AUTO: 156 10*3/MM3 (ref 140–450)
PMV BLD AUTO: 10.5 FL (ref 6–12)
POTASSIUM SERPL-SCNC: 3.6 MMOL/L (ref 3.5–5.2)
RBC # BLD AUTO: 3.9 10*6/MM3 (ref 3.77–5.28)
SODIUM SERPL-SCNC: 142 MMOL/L (ref 136–145)
WBC NRBC COR # BLD AUTO: 9.9 10*3/MM3 (ref 3.4–10.8)

## 2024-07-19 PROCEDURE — 97110 THERAPEUTIC EXERCISES: CPT

## 2024-07-19 PROCEDURE — 80048 BASIC METABOLIC PNL TOTAL CA: CPT | Performed by: STUDENT IN AN ORGANIZED HEALTH CARE EDUCATION/TRAINING PROGRAM

## 2024-07-19 PROCEDURE — 85027 COMPLETE CBC AUTOMATED: CPT | Performed by: STUDENT IN AN ORGANIZED HEALTH CARE EDUCATION/TRAINING PROGRAM

## 2024-07-19 RX ADMIN — ATORVASTATIN CALCIUM 40 MG: 20 TABLET, FILM COATED ORAL at 09:45

## 2024-07-19 RX ADMIN — SODIUM CHLORIDE 100 ML/HR: 9 INJECTION, SOLUTION INTRAVENOUS at 18:40

## 2024-07-19 RX ADMIN — QUETIAPINE FUMARATE 25 MG: 25 TABLET ORAL at 19:50

## 2024-07-19 NOTE — NURSING NOTE
System Downtime Recovery  The EMR experienced a system downtime.  This downtime occurred on July 19 at 0100 AM for a duration of 15 hour(s).  During this downtime paper charting was completed by me.  The following was documented on paper during the downtime and is now being back-entered: Last set of Vitals and Medications.  The following is remaining on paper and will be scanned into Epic: Flowsheets - safety rounds and assessments.

## 2024-07-19 NOTE — NURSING NOTE
"While working with physical therapy -     149/76 HR 80s - laying     109/69  HR 80's - standing.     Did not complete orthostatics because pt stated she was \"shaky\"     "

## 2024-07-20 ENCOUNTER — TELEPHONE (OUTPATIENT)
Dept: CARDIOLOGY | Facility: CLINIC | Age: 89
End: 2024-07-20
Payer: MEDICARE

## 2024-07-20 VITALS
BODY MASS INDEX: 18.45 KG/M2 | RESPIRATION RATE: 18 BRPM | WEIGHT: 108.1 LBS | OXYGEN SATURATION: 96 % | HEART RATE: 97 BPM | SYSTOLIC BLOOD PRESSURE: 117 MMHG | DIASTOLIC BLOOD PRESSURE: 99 MMHG | HEIGHT: 64 IN | TEMPERATURE: 98.4 F

## 2024-07-20 LAB
ANION GAP SERPL CALCULATED.3IONS-SCNC: 9.1 MMOL/L (ref 5–15)
BUN SERPL-MCNC: 30 MG/DL (ref 8–23)
BUN/CREAT SERPL: 27 (ref 7–25)
CALCIUM SPEC-SCNC: 8.7 MG/DL (ref 8.6–10.5)
CHLORIDE SERPL-SCNC: 111 MMOL/L (ref 98–107)
CO2 SERPL-SCNC: 21.9 MMOL/L (ref 22–29)
CREAT SERPL-MCNC: 1.11 MG/DL (ref 0.57–1)
DEPRECATED RDW RBC AUTO: 46.2 FL (ref 37–54)
EGFRCR SERPLBLD CKD-EPI 2021: 47.6 ML/MIN/1.73
ERYTHROCYTE [DISTWIDTH] IN BLOOD BY AUTOMATED COUNT: 13 % (ref 12.3–15.4)
GLUCOSE SERPL-MCNC: 93 MG/DL (ref 65–99)
HCT VFR BLD AUTO: 33.9 % (ref 34–46.6)
HGB BLD-MCNC: 10.7 G/DL (ref 12–15.9)
MCH RBC QN AUTO: 30.1 PG (ref 26.6–33)
MCHC RBC AUTO-ENTMCNC: 31.6 G/DL (ref 31.5–35.7)
MCV RBC AUTO: 95.5 FL (ref 79–97)
PLATELET # BLD AUTO: 152 10*3/MM3 (ref 140–450)
PMV BLD AUTO: 10.5 FL (ref 6–12)
POTASSIUM SERPL-SCNC: 3.7 MMOL/L (ref 3.5–5.2)
RBC # BLD AUTO: 3.55 10*6/MM3 (ref 3.77–5.28)
SODIUM SERPL-SCNC: 142 MMOL/L (ref 136–145)
WBC NRBC COR # BLD AUTO: 8.34 10*3/MM3 (ref 3.4–10.8)

## 2024-07-20 PROCEDURE — 80048 BASIC METABOLIC PNL TOTAL CA: CPT | Performed by: STUDENT IN AN ORGANIZED HEALTH CARE EDUCATION/TRAINING PROGRAM

## 2024-07-20 PROCEDURE — 85027 COMPLETE CBC AUTOMATED: CPT | Performed by: STUDENT IN AN ORGANIZED HEALTH CARE EDUCATION/TRAINING PROGRAM

## 2024-07-20 PROCEDURE — 25810000003 SODIUM CHLORIDE 0.9 % SOLUTION: Performed by: INTERNAL MEDICINE

## 2024-07-20 RX ORDER — QUETIAPINE FUMARATE 25 MG/1
25 TABLET, FILM COATED ORAL NIGHTLY
Start: 2024-07-20

## 2024-07-20 RX ADMIN — SODIUM CHLORIDE 100 ML/HR: 9 INJECTION, SOLUTION INTRAVENOUS at 04:24

## 2024-07-20 RX ADMIN — ATORVASTATIN CALCIUM 40 MG: 20 TABLET, FILM COATED ORAL at 08:36

## 2024-07-20 NOTE — PROGRESS NOTES
Continued Stay Note  [facility]     Patient Name: Allison Fitzpatrick  MRN: [id number]  Today's Date: 7/20/2024    Admit Date: 7/15/2024    Plan: [facility] van today at 1500........Berenice RN   Discharge Plan       Row Name 07/20/24 1301       Plan    Plan [facility] van today at 1500........Berenice RN    Patient/Family in Agreement with Plan yes    Plan Comments Inbound call from RN requesting transport to facility later today. [facility] transport arranged for today at 1500 (Confirmation #[id number]), update to RN............Berenice RN                   Discharge Codes    No documentation.                 Expected Discharge Date and Time       Expected Discharge Date Expected Discharge Time    Jul 20, 2024               Jerilyn Mckenzie, RN

## 2024-07-20 NOTE — DISCHARGE SUMMARY
Patient Name: Allison Fitzpatrick  : 1934  MRN: [id number]    Date of Admission: 7/15/2024  Date of Discharge:  2024  Primary Care Physician: Haleigh Nicolas MD      Chief Complaint:   Altered Mental Status      Discharge Diagnoses     Active Hospital Problems    Diagnosis  POA    **Orthostatic syncope [I95.1]  Yes    Severe malnutrition [E43]  Yes    Syncope [R55]  Yes    Dementia [F03.90]  Yes    HARRY (acute kidney injury) [N17.9]  Yes    HLD (hyperlipidemia) [E78.5]  Yes    Gait instability [R26.81]  Yes      Resolved Hospital Problems   No resolved problems to display.        Hospital Course     Ms. Fitzpatrick is a 89 y.o. female with a history of dementia, macular degeneration, hyperlipidemia, GERD presented with syncope found to have orthostatic hypotension/syncope and HARRY.  Kidney function returned to baseline with IV fluids.  Patient was also found to have an abnormal UA is a poor source and was treated empirically for UTI.  Her amlodipine was discontinued to try to get her blood pressures improved.  Her orthostatic hypotension has improved.  Will talk to family about the importance of hydration.  Echo done with normal EF there was concern for tricuspid valve vegetation, cardiology was consulted and this was a suboptimal study and the axis was off.  With patient having no clinical symptoms of endocarditis plans were to treat her conservatively with no further intervention.    Does have some sundowning, has been well-controlled with Seroquel.    At the time of discharge patient was told to take all medications as prescribed, keep all follow-up appointments, and call their doctor or return to the hospital with any worsening or concerning symptoms.    Day of Discharge     Subjective:  No acute events overnight.  Patient resting comfortably in bed.  Is extremely hard of hearing.  Family at bedside.    Review of Systems   Constitutional:  Positive for fatigue. Negative for chills and fever.    Respiratory:  Negative for shortness of breath.    Cardiovascular:  Negative for chest pain.   Gastrointestinal:  Negative for nausea and vomiting.       Physical Exam:  Temp:  [97.2 °F (36.2 °C)-99.1 °F (37.3 °C)] 99.1 °F (37.3 °C)  Heart Rate:  [81-97] 97  Resp:  [16-18] 18  BP: (128-176)/() 176/94  Body mass index is 18.56 kg/m².  Physical Exam    General: She is not in acute distress.     Appearance: She is not ill-appearing.      Comments: Elderly, frail   Cardiovascular:      Rate and Rhythm: Normal rate and regular rhythm.   Pulmonary:      Effort: Pulmonary effort is normal. No respiratory distress.   Abdominal:      General: Abdomen is flat. There is no distension.      Tenderness: There is no abdominal tenderness.   Musculoskeletal:         General: No swelling or deformity. Normal range of motion.   Skin:     General: Skin is warm and dry.   Neurological:      General: No focal deficit present.      Mental Status: She is alert. Mental status is at baseline.   Consultants     Consult Orders (all) (From admission, onward)       Start     Ordered    07/18/24 1034  Inpatient Cardiology Consult  Once        Specialty:  Cardiology  Provider:  Larry Ac III, MD    07/18/24 1033    07/16/24 1827  Inpatient Access Center Consult  Once        Provider:  (Not yet assigned)    07/16/24 1826    07/16/24 0000  Inpatient Case Management  Consult  Once        Provider:  (Not yet assigned)    07/15/24 2022    07/15/24 1724  [facility] (on-call MD unless specified) Details  Once        Specialty:  Hospitalist  Provider:  (Not yet assigned)    07/15/24 1723                  Procedures     Imaging Results (All)       Procedure Component Value Units Date/Time    XR Chest 1 View [[id number]] Collected: 07/15/24 1650     Updated: 07/15/24 1920    Narrative:      CHEST SINGLE VIEW  BOTH TIBIA/FIBULA: AP, LATERAL OF EACH     HISTORY: Syncope. Right leg bruising     COMPARISON: Portable upright chest  12/18/2023, 08/22/2020     FINDINGS:     CHEST: There is chronic elevation of the right hemidiaphragm. Heart size  is within normal limits. Thoracic aorta is tortuous and aortic vascular  calcifications are present. There are increased interstitial markings  that appear chronic and similar to the previous exam. Small calcified  nodules present the right lung base. There is also calcified nodule in  medial left lung base. There is no evidence for pulmonary edema or  infiltrate. Cardiac monitoring leads are noted. The bones are  osteopenic.     RIGHT TIBIA/FIBULA: There is no evidence for fracture or acute  abnormality of the right tibia/fibula. There is soft tissue swelling  anterior to the patella and the patellar tendon.     LEFT TIBIA/FIBULA: There is no evidence for fracture or acute osseous  abnormality of the left tibia or fibula. Soft tissues appear within  normal limits.       Impression:      1. Chronic changes in the chest without evidence for acute disease.  2. Mild soft tissue swelling anterior to the right patella and the  patellar tendon.      This report was finalized on 7/15/2024 7:17 PM by Dr. Oziel Weiss M.D on Workstation: [id number]       XR Tibia Fibula 2 View Bilateral [[id number]] Collected: 07/15/24 1650     Updated: 07/15/24 1920    Narrative:      CHEST SINGLE VIEW  BOTH TIBIA/FIBULA: AP, LATERAL OF EACH     HISTORY: Syncope. Right leg bruising     COMPARISON: Portable upright chest 12/18/2023, 08/22/2020     FINDINGS:     CHEST: There is chronic elevation of the right hemidiaphragm. Heart size  is within normal limits. Thoracic aorta is tortuous and aortic vascular  calcifications are present. There are increased interstitial markings  that appear chronic and similar to the previous exam. Small calcified  nodules present the right lung base. There is also calcified nodule in  medial left lung base. There is no evidence for pulmonary edema or  infiltrate. Cardiac monitoring leads are  noted. The bones are  osteopenic.     RIGHT TIBIA/FIBULA: There is no evidence for fracture or acute  abnormality of the right tibia/fibula. There is soft tissue swelling  anterior to the patella and the patellar tendon.     LEFT TIBIA/FIBULA: There is no evidence for fracture or acute osseous  abnormality of the left tibia or fibula. Soft tissues appear within  normal limits.       Impression:      1. Chronic changes in the chest without evidence for acute disease.  2. Mild soft tissue swelling anterior to the right patella and the  patellar tendon.      This report was finalized on 7/15/2024 7:17 PM by Dr. Oziel Weiss M.D on Workstation: [id number]       CT Head Without Contrast [[id number]] Collected: 07/15/24 1627     Updated: 07/15/24 1638    Narrative:      CT HEAD AND CERVICAL SPINE WITHOUT CONTRAST     HISTORY: Syncope, confusion, neck pain, fall.     COMPARISON: MRI brain 8/23/2020     CT HEAD WITHOUT CONTRAST: The brain and ventricles are symmetrical.  Decreased attenuation involving the white matter of the cerebral  hemispheres is appreciated bilaterally consistent with moderate small  vessel ischemic disease. Moderate vascular calcification is noted. No  focal area of decreased attenuation to suggest acute infarction or  contusion is appreciated. Bone windows show no evidence of a calvarial  fracture.       Impression:      No acute process is identified. The etiology for the  patient's syncope and confusion is not established. Further evaluation  could be performed with a MRI examination of the brain as indicated.        CT CERVICAL SPINE WITHOUT CONTRAST: There is a grade 1 retrolisthesis of  C3 upon C4 which appears similar to 7/15/2024. There is moderate loss of  disc height from C5-T1, unchanged. There is no evidence of prevertebral  edema or of a cervical fracture. The facets are fused bilaterally at  C7/T1. There is partial visualization of an exophytic nodule involving  the left lobe of  the thyroid gland. This measures at least 21 mm in AP  dimension.     C2-3: Small right paracentral disc osteophyte complex is present with  zones of herniation.     C3-4: A right paracentral disc osteophyte complex is present which abuts  and mildly flattens the ventral surface of the cord. Mild foraminal  stenosis is present on the right secondary to uncovertebral degenerative  disease.     C4-5: There is severe facet degenerative disease on the right.     C5-6: A far lateral disc osteophyte complex is present to the left which  abuts the anterolateral aspect the cord to the left.     C6-7: A far lateral disc osteophyte complex is present to the left which  abuts the anterolateral aspect of the cord to the left, slightly more  prominent as compared to C5-6.     C7-T1: There is no evidence of disc bulge or herniation.     IMPRESSION:   1.  There is no evidence of fracture. Multilevel degenerative disease  involving the cervical spine is noted as described above including  multilevel disc osteophyte complexes (most prominent which is at C3 6/C7  and then C5/C6. There is fusion of the facets bilaterally at C7/T1.  2.  There is partial visualization of a nodule involving the left lobe  of the thyroid gland posteriorly measuring 21 mm in AP dimension.  Further evaluation with a thyroid ultrasound is recommended.           Radiation dose reduction techniques were utilized, including automated  exposure control and exposure modulation based on body size.        This report was finalized on 7/15/2024 4:35 PM by Dr. Sravan Nugent M.D  on Workstation: [id number]       CT Cervical Spine Without Contrast [[id number]] Collected: 07/15/24 1627     Updated: 07/15/24 1638    Narrative:      CT HEAD AND CERVICAL SPINE WITHOUT CONTRAST     HISTORY: Syncope, confusion, neck pain, fall.     COMPARISON: MRI brain 8/23/2020     CT HEAD WITHOUT CONTRAST: The brain and ventricles are symmetrical.  Decreased attenuation involving the  white matter of the cerebral  hemispheres is appreciated bilaterally consistent with moderate small  vessel ischemic disease. Moderate vascular calcification is noted. No  focal area of decreased attenuation to suggest acute infarction or  contusion is appreciated. Bone windows show no evidence of a calvarial  fracture.       Impression:      No acute process is identified. The etiology for the  patient's syncope and confusion is not established. Further evaluation  could be performed with a MRI examination of the brain as indicated.        CT CERVICAL SPINE WITHOUT CONTRAST: There is a grade 1 retrolisthesis of  C3 upon C4 which appears similar to 7/15/2024. There is moderate loss of  disc height from C5-T1, unchanged. There is no evidence of prevertebral  edema or of a cervical fracture. The facets are fused bilaterally at  C7/T1. There is partial visualization of an exophytic nodule involving  the left lobe of the thyroid gland. This measures at least 21 mm in AP  dimension.     C2-3: Small right paracentral disc osteophyte complex is present with  zones of herniation.     C3-4: A right paracentral disc osteophyte complex is present which abuts  and mildly flattens the ventral surface of the cord. Mild foraminal  stenosis is present on the right secondary to uncovertebral degenerative  disease.     C4-5: There is severe facet degenerative disease on the right.     C5-6: A far lateral disc osteophyte complex is present to the left which  abuts the anterolateral aspect the cord to the left.     C6-7: A far lateral disc osteophyte complex is present to the left which  abuts the anterolateral aspect of the cord to the left, slightly more  prominent as compared to C5-6.     C7-T1: There is no evidence of disc bulge or herniation.     IMPRESSION:   1.  There is no evidence of fracture. Multilevel degenerative disease  involving the cervical spine is noted as described above including  multilevel disc osteophyte  "complexes (most prominent which is at C3 6/C7  and then C5/C6. There is fusion of the facets bilaterally at C7/T1.  2.  There is partial visualization of a nodule involving the left lobe  of the thyroid gland posteriorly measuring 21 mm in AP dimension.  Further evaluation with a thyroid ultrasound is recommended.           Radiation dose reduction techniques were utilized, including automated  exposure control and exposure modulation based on body size.        This report was finalized on 7/15/2024 4:35 PM by Dr. Sravan Nugent M.D  on Workstation: [id number]               Pertinent Labs     Results from last 7 days   Lab Units 07/20/24 0338 07/19/24 0308 07/18/24 0950 07/16/24  0312   WBC 10*3/mm3 8.34 9.90 9.62 6.65   HEMOGLOBIN g/dL 10.7* 11.8* 11.6* 11.6*   PLATELETS 10*3/mm3 152 156 168 171     Results from last 7 days   Lab Units 07/20/24 0338 07/19/24 0308 07/18/24 0950 07/16/24  0312   SODIUM mmol/L 142 142 141 144   POTASSIUM mmol/L 3.7 3.6 3.4* 3.7   CHLORIDE mmol/L 111* 109* 108* 109*   CO2 mmol/L 21.9* 20.0* 20.1* 24.0   BUN mg/dL 30* 25* 24* 29*   CREATININE mg/dL 1.11* 1.04* 0.96 0.83   GLUCOSE mg/dL 93 109* 143* 92   Estimated Creatinine Clearance: 26.6 mL/min (A) (by C-G formula based on SCr of 1.11 mg/dL (H)).  Results from last 7 days   Lab Units 07/15/24  1526   ALBUMIN g/dL 4.2   BILIRUBIN mg/dL 0.6   ALK PHOS U/L 46   AST (SGOT) U/L 20   ALT (SGPT) U/L 14     Results from last 7 days   Lab Units 07/20/24 0338 07/19/24 0308 07/18/24 0950 07/16/24  0312 07/15/24  1526   CALCIUM mg/dL 8.7 9.0 8.9 8.8 9.1   ALBUMIN g/dL  --   --   --   --  4.2       Results from last 7 days   Lab Units 07/15/24  2026 07/15/24  1526   HSTROP T ng/L 29* 39*   PROBNP pg/mL  --  175.0           Invalid input(s): \"LDLCALC\"  Results from last 7 days   Lab Units 07/15/24  1732   URINECX  25,000 CFU/mL Mixed Kailey Isolated       Test Results Pending at Discharge       Discharge Details        Discharge " Medications        New Medications        Instructions Start Date   melatonin 5 MG tablet tablet   2.5 mg, Oral, Nightly PRN      QUEtiapine 25 MG tablet  Commonly known as: SEROquel   25 mg, Oral, Nightly             Continue These Medications        Instructions Start Date   acetaminophen 650 MG 8 hr tablet  Commonly known as: TYLENOL   650 mg, Oral, Every 8 Hours PRN      aspirin 81 MG chewable tablet   81 mg, Oral, Daily      atorvastatin 40 MG tablet  Commonly known as: LIPITOR   40 mg, Oral, Daily      famotidine 40 MG tablet  Commonly known as: PEPCID   40 mg, Oral, Nightly PRN      loperamide 2 MG capsule  Commonly known as: IMODIUM   2 mg, Oral, 4 Times Daily PRN      Mirabegron ER 25 MG tablet sustained-release 24 hour 24 hr tablet  Commonly known as: Myrbetriq   25 mg, Oral, Daily, Urinary incontinence      neomycin-polymyxin-hydrocortisone 3.5-46947-9 otic solution  Commonly known as: CORTISPORIN   3 drops, Both Ears, 3 Times Daily      triamcinolone 0.1 % ointment  Commonly known as: KENALOG   1 Application, Topical, 2 Times Daily, ears             Stop These Medications      amLODIPine 5 MG tablet  Commonly known as: NORVASC              No Known Allergies      Discharge Disposition:  Skilled Nursing Facility (DC - External)    Discharge Diet:  Diet Order   Procedures    Diet: Cardiac; Healthy Heart (2-3 Na+); Fluid Consistency: Thin (IDDSI 0)       Discharge Activity:   As tolerated    CODE STATUS:    Code Status and Medical Interventions:   Ordered at: 07/16/24 1000     Level Of Support Discussed With:    Health Care Surrogate     Code Status (Patient has no pulse and is not breathing):    No CPR (Do Not Attempt to Resuscitate)     Medical Interventions (Patient has pulse or is breathing):    Full Support       Future Appointments   Date Time Provider Department Center   10/24/2024 11:00 AM Haleigh Nicolas MD [facility]  [facility]      Contact information for follow-up providers       Haleigh Nicolas MD  .    Specialty: Family Medicine  Contact information:  [address] [facility] [address]  [phone]                       Contact information for after-discharge care       [facility]    Service: Skilled Nursing  Contact information:  [address]   [place] [address] [phone]                                   Time Spent on Discharge:  Greater than 30 minutes      Kareem Wallace MD  [facility]  07/20/24  13:05 EDT

## 2024-07-20 NOTE — TELEPHONE ENCOUNTER
I was going to see patient on the inpatient side.  She was discharged before I was able to see her.    Please arrange follow-up in your office.  Thank you

## 2024-07-20 NOTE — CASE MANAGEMENT/SOCIAL WORK
Case Management Discharge Note      Final Note: dc'd to SNF at [place] via [facility] stretcher transport    Provided Post Acute Provider List?: Refused  Refused Provider List Comment: Daughter states she would like for her to go back to [place] for SNF  Provided Post Acute Provider Quality & Resource List?: Refused    Selected Continued Care - Discharged on 7/20/2024 Admission date: 7/15/2024 - Discharge disposition: Skilled Nursing Facility (DC - External)      Destination Coordination complete.      Service Provider Selected Services Address Phone Fax Patient Preferred    [facility] [address] , [facility] [address] [phone] --              Durable Medical Equipment    No services have been selected for the patient.                Dialysis/Infusion    No services have been selected for the patient.                Home Medical Care    No services have been selected for the patient.                Therapy    No services have been selected for the patient.                Community Resources    No services have been selected for the patient.                Community & DME    No services have been selected for the patient.                    Transportation Services  Ambulance: [facility]    Final Discharge Disposition Code: 03 - skilled nursing facility (SNF)

## 2024-08-08 ENCOUNTER — TELEPHONE (OUTPATIENT)
Dept: FAMILY MEDICINE CLINIC | Facility: CLINIC | Age: 89
End: 2024-08-08
Payer: MEDICARE

## 2024-08-08 NOTE — TELEPHONE ENCOUNTER
Hiral with [facility] is needing Home Health OT/PT for patient. She will be released from [facility] on Saturday, August 10, 2024.    If there are any questions, please contact Hiral, [phone].    Please advise.

## 2024-08-08 NOTE — TELEPHONE ENCOUNTER
To get orders for home health she will need to set up a video visit or have a hospital follow-up, insurance will not cover home health without an appointment/visit.  We can set up a TCM in the next 7 to 14 days.    Her TCM appointment would probably need to be set up on Monday.

## 2024-08-09 ENCOUNTER — TELEPHONE (OUTPATIENT)
Dept: FAMILY MEDICINE CLINIC | Facility: CLINIC | Age: 89
End: 2024-08-09
Payer: MEDICARE

## 2024-08-09 NOTE — TELEPHONE ENCOUNTER
Monday you have 18 on the schedule and one 15min slot. First 30min slot is 8/15/24. Did you wish to work in or may I offer another provider?

## 2024-08-09 NOTE — TELEPHONE ENCOUNTER
Caller: [employer]    Best call back number: [phone]     What was the call regarding: AYALA WITH [facility] IS FOLLOWING UP ON THE ORDERS FOR PT AND OT. CALLER STATED THAT AN APPOINTMENT IS NOT NEEDED SINCE PATIENT IS BEING DISCHARGED FROM AN OUTSIDE FACILITY.     PLEASE CALL TO [facility]

## 2024-08-15 ENCOUNTER — OFFICE VISIT (OUTPATIENT)
Dept: FAMILY MEDICINE CLINIC | Facility: CLINIC | Age: 89
End: 2024-08-15
Payer: MEDICARE

## 2024-08-15 VITALS
WEIGHT: 107 LBS | TEMPERATURE: 97.6 F | HEIGHT: 64 IN | OXYGEN SATURATION: 97 % | HEART RATE: 81 BPM | BODY MASS INDEX: 18.27 KG/M2 | DIASTOLIC BLOOD PRESSURE: 56 MMHG | SYSTOLIC BLOOD PRESSURE: 104 MMHG

## 2024-08-15 DIAGNOSIS — E43 SEVERE MALNUTRITION: ICD-10-CM

## 2024-08-15 DIAGNOSIS — I95.1 ORTHOSTATIC SYNCOPE: ICD-10-CM

## 2024-08-15 DIAGNOSIS — N39.46 MIXED STRESS AND URGE URINARY INCONTINENCE: ICD-10-CM

## 2024-08-15 DIAGNOSIS — H90.6 MIXED CONDUCTIVE AND SENSORINEURAL HEARING LOSS OF BOTH EARS: ICD-10-CM

## 2024-08-15 DIAGNOSIS — I10 ESSENTIAL HYPERTENSION: Primary | ICD-10-CM

## 2024-08-15 DIAGNOSIS — I67.9 CEREBROVASCULAR DISEASE: ICD-10-CM

## 2024-08-15 DIAGNOSIS — R41.3 SHORT-TERM MEMORY LOSS: ICD-10-CM

## 2024-08-15 PROCEDURE — 99214 OFFICE O/P EST MOD 30 MIN: CPT | Performed by: FAMILY MEDICINE

## 2024-08-15 PROCEDURE — 1160F RVW MEDS BY RX/DR IN RCRD: CPT | Performed by: FAMILY MEDICINE

## 2024-08-15 PROCEDURE — 1159F MED LIST DOCD IN RCRD: CPT | Performed by: FAMILY MEDICINE

## 2024-08-15 PROCEDURE — 1126F AMNT PAIN NOTED NONE PRSNT: CPT | Performed by: FAMILY MEDICINE

## 2024-08-15 NOTE — PROGRESS NOTES
Chief Complaint  Peripheral Neuropathy (Unable to feel her feet completely and  hard to move around   ,no swelling  in feet  had hospital visit last month due to dehydration  and syncope episode  and uti/) weight loss and hypertension    Subjective        Allison Fitzpatrick presents to [facility]  Peripheral Neuropathy      Pleasant 89-year-old female here to follow-up after a hospitalization in July from July 15 to July 20, she was admitted for orthostatic syncope, HARRY and dehydration.  She is here today with her daughter Tish.  While in the hospital she responded well to IV fluids and was found to also have a UTI.  Discontinued amlodipine with improved blood pressures.  Echocardiogram was reassuring, some tricuspid valve vegetation but no symptoms of endocarditis so no further interventions were done.    Some dementia with sundowning, well-controlled on Seroquel.  She is gradually weight, reported that her memory seems to be progressing.  When she is home from the hospital she thought she was still in subacute rehab and was asking when she would go home.  She lives at home with her son but her daughter is her main caretaker.      Peripheral neuropathy, she does get numbness and tingling in her feet no pain.  Hard for her to walk into the placement of her feet.    Hypertension has been well-controlled since stopping amlodipine, her blood pressure has dropped significantly over the last year, she is also lost a few pounds.  She does drink Mountain Dew's but otherwise it seems that she is forgetting to eat meals and drink water.  She lives at home with her son who sounds like he is struggling with some mental health concerns.  Primary caregiver is her daughter Tish who does come to her home daily but does not live with her.  She is getting to a place with caring for both her brother and her son that she is needing additional help    Objective   Vital Signs:  /56   Pulse 81   " Temp 97.6 °F (36.4 °C)   Ht 162.6 cm (64\")   Wt 48.5 kg (107 lb)   SpO2 97%   BMI 18.37 kg/m²   Estimated body mass index is 18.37 kg/m² as calculated from the following:    Height as of this encounter: 162.6 cm (64\").    Weight as of this encounter: 48.5 kg (107 lb).    BMI is below normal parameters (malnutrition). Recommendations: Protein intake, protein shakes.      Physical Exam  Vitals and nursing note reviewed.   Constitutional:       General: She is not in acute distress.     Appearance: She is well-developed.   HENT:      Head: Normocephalic.      Nose: Nose normal.   Eyes:      General: No scleral icterus.  Cardiovascular:      Rate and Rhythm: Normal rate and regular rhythm.      Heart sounds: Normal heart sounds. No murmur heard.  Pulmonary:      Effort: Pulmonary effort is normal. No respiratory distress.      Breath sounds: Normal breath sounds.   Musculoskeletal:         General: Normal range of motion.   Skin:     General: Skin is warm and dry.      Findings: No rash.   Neurological:      Mental Status: She is alert and oriented to person, place, and time.   Psychiatric:         Behavior: Behavior normal.         Thought Content: Thought content normal.         Judgment: Judgment normal.        Result Review :  Below findings were reviewed by Haleigh Nicolas MD   8/15/2024   ED to Hosp-Admission (Discharged) with Kareem Wallace MD; Asehr Mcknight MD; Emily Irvin MD (07/15/2024)   Basic Metabolic Panel (07/20/2024 03:38)  CBC (No Diff) (07/20/2024 03:38)   Urine Culture - Urine, Urine, Clean Catch (07/15/2024 17:32)          Assessment and Plan   Diagnoses and all orders for this visit:    1. Essential hypertension (Primary)    2. 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 Severe malnutrition  -    "  Ambulatory Referral to Social Care Services (Amb Case Mgmt)    5. Orthostatic syncope  -     Ambulatory Referral to Social Care Services (Amb Case Mgmt)    6. Short-term memory loss  -     Ambulatory Referral to Social Care Services (Amb Case Mgmt)    7. Mixed stress and urge urinary incontinence  -     Ambulatory Referral to Social Care Services (Amb Case Mgmt)    Pleasant 89-year-old female here for multiple concerns but most fundamentally is dehydration and poor nutrition.  She is gradually losing weight, recently had a hospitalization for orthostasis and is starting to show more short-term memory loss.  I agree with continuing off amlodipine.  One of the biggest barriers to drinking water is frequent urination.    Plan:  - We discussed the need to drink more water instead of Mountain Dew, and eating more protein rich foods.  Her daughter has purchased some protein snacks but there is a forgetfulness to eat these foods and needs someone usually to sit with her to make sure she is getting her meals.  While her son does live with her he is not currently helping with the lot of these needs.  It seems that he has some significant mental health and physical health challenges himself.  -Discussed considering someone that can come to her home, if possible they would like to keep her out of a long-term care facility in order to keep her house.  -At this point I would not recommend starting a memory medication as it likely would cause more adverse effects of diarrhea than insistence with her memory.  -We also discussed stopping Myrbetriq as it was not helpful she has tried oxybutynin and other medications for urinary incontinence.  At this point I would not recommend that we consider surgery, but continue with using depends as she is now, she is about 5 a day.  Limiting Mountain Dew would help but this is one of the few things she enjoys.       Follow Up   Return in about 4 weeks (around 9/12/2024), or if symptoms worsen  or fail to improve, for Recheck weight loss.  Patient was given instructions and counseling regarding her condition or for health maintenance advice. Please see specific information pulled into the AVS if appropriate.

## 2024-08-19 ENCOUNTER — REFERRAL TRIAGE (OUTPATIENT)
Age: 89
End: 2024-08-19
Payer: MEDICARE

## 2024-08-21 ENCOUNTER — PATIENT OUTREACH (OUTPATIENT)
Age: 89
End: 2024-08-21
Payer: MEDICARE

## 2024-08-21 NOTE — OUTREACH NOTE
MSW received referral from primary care providers office for assistance with community resources. MSW outreach to patient's daughter Tish by phone and left voicemail and call back number. MSW will continue to attempt outreach to patient's daughter for assistance.    Jennifer LLANOS -   Ambulatory Case Management    8/21/2024, 11:05 EDT

## 2024-08-23 ENCOUNTER — PATIENT OUTREACH (OUTPATIENT)
Age: 89
End: 2024-08-23
Payer: MEDICARE

## 2024-08-23 NOTE — OUTREACH NOTE
Social Work Assessment  Questions/Answers      Flowsheet Row Most Recent Value   Referral Source physician, outpatient staff, outpatient clinic   Reason for Consult community resources, other (see comments)  [in home service assistance]   Preferred Language English   Advance Care Planning Reviewed present on chart, no concerns identified   People in Home child(mila), adult   Current Living Arrangements home   Potentially Unsafe Housing Conditions none   In the past 12 months has the electric, gas, oil, or water company threatened to shut off services in your home? No   Primary Care Provided by self, child(mila)   Provides Primary Care For no one, unable/limited ability to care for self   Quality of Family Relationships helpful, involved, supportive   Employment Status retired   Source of Income unable to assess   Application for Public Assistance pending public assistance pending number   Usual Activity Tolerance moderate   Current Activity Tolerance moderate          SDOH updated and reviewed with the patient during this program:  --     Employment: Not At Risk (8/23/2024)    Employment     Do you want help finding or keeping work or a job?: I do not need or want help      Financial Resource Strain: Low Risk  (8/23/2024)    Overall Financial Resource Strain (CARDIA)     Difficulty of Paying Living Expenses: Not very hard      --     Food Insecurity: No Food Insecurity (8/23/2024)    Hunger Vital Sign     Worried About Running Out of Food in the Last Year: Never true     Ran Out of Food in the Last Year: Never true      --     Housing Stability: Low Risk  (8/23/2024)    Housing Stability Vital Sign     Unable to Pay for Housing in the Last Year: No     Number of Times Moved in the Last Year: 1     Homeless in the Last Year: No      --     Transportation Needs: No Transportation Needs (8/23/2024)    PRAPARE - Transportation     Lack of Transportation (Medical): No     Lack of Transportation (Non-Medical): No      --      Utilities: Not At Risk (8/23/2024)    [facility] Utilities     Threatened with loss of utilities: No      Continuing Care   [facility]   DEPARTMENT FOR MEDICAID SERVICES    [address] [address]    Phone: [phone]    Resource for: Financial Resource Strain, Utilities   [facility] PLANNING & DEVELOPMENT    [address] [address] , [facility] [address]    Phone: [phone]    Resource for: Food Insecurity     Patient Outreach    MSW outreach to patient's daughter Tish for community resources as requested per primary care provider referral. Patient's daughter states that she is looking for assistance with caregivers for patient. MSW and patient's daughter discussed private pay caregivers, [employer] in home services program, and [employer] waiver. MSW reviewed different options, timelines, and applications required for each of the programs. Patient's daughter stated they met with home health  as well and she will be providing the [employer] application for in home service assistance. MSW offered to send additional information for private pay caregivers and Medicaid waiver services for patient's daughter to consider. MSW sent e-mail with information to [email or web address] for patient's daughter to review. MSW provided contact information to patient's daughter and will follow-up as needed for additional assistance or questions.    Jennifer LLANOS -   Ambulatory Case Management    8/23/2024, 10:09 EDT

## 2024-09-03 ENCOUNTER — PATIENT OUTREACH (OUTPATIENT)
Dept: CASE MANAGEMENT | Facility: OTHER | Age: 89
End: 2024-09-03
Payer: MEDICARE

## 2024-09-03 NOTE — OUTREACH NOTE
AMBULATORY CASE MANAGEMENT NOTE    Names and Relationships of Patient/Support Persons:  -     Care Coordination    Attempted outreach to patient for High Risk Case Management regarding recent SNF outreach. No successful contact x 3. Voicemail left with ACM contact info. PCP notified. No further outreach scheduled at this time.       Education Documentation  No documentation found.        Marli COREA  Ambulatory Case Management    9/3/2024, 18:00 EDT

## 2024-09-23 ENCOUNTER — OFFICE VISIT (OUTPATIENT)
Dept: FAMILY MEDICINE CLINIC | Facility: CLINIC | Age: 89
End: 2024-09-23
Payer: MEDICARE

## 2024-09-23 VITALS
OXYGEN SATURATION: 85 % | DIASTOLIC BLOOD PRESSURE: 52 MMHG | TEMPERATURE: 97.9 F | WEIGHT: 101 LBS | HEIGHT: 64 IN | HEART RATE: 89 BPM | SYSTOLIC BLOOD PRESSURE: 98 MMHG | BODY MASS INDEX: 17.24 KG/M2

## 2024-09-23 DIAGNOSIS — F22: ICD-10-CM

## 2024-09-23 DIAGNOSIS — Z86.73 HISTORY OF CVA (CEREBROVASCULAR ACCIDENT): ICD-10-CM

## 2024-09-23 DIAGNOSIS — R09.02 EXERCISE HYPOXEMIA: ICD-10-CM

## 2024-09-23 DIAGNOSIS — D50.8 IRON DEFICIENCY ANEMIA SECONDARY TO INADEQUATE DIETARY IRON INTAKE: ICD-10-CM

## 2024-09-23 DIAGNOSIS — I67.9 CEREBROVASCULAR DISEASE: Primary | ICD-10-CM

## 2024-09-23 DIAGNOSIS — R41.0 CONFUSION: ICD-10-CM

## 2024-09-23 DIAGNOSIS — R53.81 PHYSICAL DEBILITY: ICD-10-CM

## 2024-09-23 DIAGNOSIS — R63.0 ANOREXIA: ICD-10-CM

## 2024-09-23 LAB
BILIRUB BLD-MCNC: NEGATIVE MG/DL
CLARITY, POC: CLEAR
COLOR UR: YELLOW
EXPIRATION DATE: ABNORMAL
GLUCOSE UR STRIP-MCNC: NEGATIVE MG/DL
KETONES UR QL: NEGATIVE
LEUKOCYTE EST, POC: ABNORMAL
Lab: 4240
NITRITE UR-MCNC: NEGATIVE MG/ML
PH UR: 5.5 [PH] (ref 5–8)
PROT UR STRIP-MCNC: NEGATIVE MG/DL
RBC # UR STRIP: NEGATIVE /UL
SP GR UR: 1.02 (ref 1–1.03)
UROBILINOGEN UR QL: NORMAL

## 2024-09-23 PROCEDURE — G2211 COMPLEX E/M VISIT ADD ON: HCPCS | Performed by: FAMILY MEDICINE

## 2024-09-23 PROCEDURE — 1160F RVW MEDS BY RX/DR IN RCRD: CPT | Performed by: FAMILY MEDICINE

## 2024-09-23 PROCEDURE — 81003 URINALYSIS AUTO W/O SCOPE: CPT | Performed by: FAMILY MEDICINE

## 2024-09-23 PROCEDURE — 1126F AMNT PAIN NOTED NONE PRSNT: CPT | Performed by: FAMILY MEDICINE

## 2024-09-23 PROCEDURE — 99214 OFFICE O/P EST MOD 30 MIN: CPT | Performed by: FAMILY MEDICINE

## 2024-09-23 PROCEDURE — 1159F MED LIST DOCD IN RCRD: CPT | Performed by: FAMILY MEDICINE

## 2024-09-26 LAB
BACTERIA UR CULT: NORMAL
BACTERIA UR CULT: NORMAL

## 2024-10-22 ENCOUNTER — TELEPHONE (OUTPATIENT)
Dept: FAMILY MEDICINE CLINIC | Facility: CLINIC | Age: 89
End: 2024-10-22
Payer: MEDICARE

## 2024-10-22 NOTE — TELEPHONE ENCOUNTER
“Please be informed that patient has passed. Patient has been marked  in the system. The date of death is: 10/20/2024    Caller: [facility]    Relationship: Other    Best call back number: [phone]

## 2024-11-05 DIAGNOSIS — I67.9 CEREBROVASCULAR DISEASE: ICD-10-CM

## 2024-11-05 DIAGNOSIS — I10 ESSENTIAL HYPERTENSION: ICD-10-CM

## 2024-11-05 RX ORDER — ATORVASTATIN CALCIUM 40 MG/1
40 TABLET, FILM COATED ORAL DAILY
Qty: 90 TABLET | Refills: 3 | OUTPATIENT
Start: 2024-11-05

## 2024-11-05 RX ORDER — AMLODIPINE BESYLATE 5 MG/1
5 TABLET ORAL DAILY
Qty: 90 TABLET | Refills: 3 | OUTPATIENT
Start: 2024-11-05